# Patient Record
Sex: MALE | Race: WHITE | NOT HISPANIC OR LATINO | Employment: OTHER | URBAN - METROPOLITAN AREA
[De-identification: names, ages, dates, MRNs, and addresses within clinical notes are randomized per-mention and may not be internally consistent; named-entity substitution may affect disease eponyms.]

---

## 2017-06-25 ENCOUNTER — MEDICAL CORRESPONDENCE (OUTPATIENT)
Dept: HEALTH INFORMATION MANAGEMENT | Facility: CLINIC | Age: 27
End: 2017-06-25

## 2018-02-06 ENCOUNTER — MEDICAL CORRESPONDENCE (OUTPATIENT)
Dept: HEALTH INFORMATION MANAGEMENT | Facility: CLINIC | Age: 28
End: 2018-02-06

## 2018-02-20 ENCOUNTER — RADIANT APPOINTMENT (OUTPATIENT)
Dept: CARDIOLOGY | Facility: CLINIC | Age: 28
End: 2018-02-20
Payer: COMMERCIAL

## 2018-02-20 DIAGNOSIS — G71.01 DMD (DUCHENNE MUSCULAR DYSTROPHY) (H): ICD-10-CM

## 2018-02-20 DIAGNOSIS — I42.9 CARDIOMYOPATHY (H): ICD-10-CM

## 2018-07-19 ENCOUNTER — MEDICAL CORRESPONDENCE (OUTPATIENT)
Dept: HEALTH INFORMATION MANAGEMENT | Facility: CLINIC | Age: 28
End: 2018-07-19

## 2018-10-05 ENCOUNTER — RADIANT APPOINTMENT (OUTPATIENT)
Dept: CARDIOLOGY | Facility: CLINIC | Age: 28
End: 2018-10-05
Payer: COMMERCIAL

## 2018-10-05 DIAGNOSIS — G71.01 DMD (DUCHENNE MUSCULAR DYSTROPHY) (H): ICD-10-CM

## 2018-10-18 ENCOUNTER — MEDICAL CORRESPONDENCE (OUTPATIENT)
Dept: HEALTH INFORMATION MANAGEMENT | Facility: CLINIC | Age: 28
End: 2018-10-18

## 2019-05-01 DIAGNOSIS — G71.01 DMD (DUCHENNE MUSCULAR DYSTROPHY) (H): Primary | ICD-10-CM

## 2019-05-21 ENCOUNTER — ANCILLARY PROCEDURE (OUTPATIENT)
Dept: CARDIOLOGY | Facility: CLINIC | Age: 29
End: 2019-05-21
Payer: COMMERCIAL

## 2019-05-21 DIAGNOSIS — G71.01 DMD (DUCHENNE MUSCULAR DYSTROPHY) (H): ICD-10-CM

## 2019-11-15 ENCOUNTER — TRANSFERRED RECORDS (OUTPATIENT)
Dept: HEALTH INFORMATION MANAGEMENT | Facility: CLINIC | Age: 29
End: 2019-11-15

## 2019-12-03 ENCOUNTER — MEDICAL CORRESPONDENCE (OUTPATIENT)
Dept: HEALTH INFORMATION MANAGEMENT | Facility: CLINIC | Age: 29
End: 2019-12-03

## 2019-12-27 ASSESSMENT — ENCOUNTER SYMPTOMS
MEMORY LOSS: 0
DECREASED APPETITE: 0
FEVER: 0
WEIGHT GAIN: 0
MUSCLE CRAMPS: 0
ABDOMINAL PAIN: 1
NIGHT SWEATS: 1
PALPITATIONS: 0
SEIZURES: 0
ARTHRALGIAS: 1
DIARRHEA: 0
MUSCLE WEAKNESS: 1
DISTURBANCES IN COORDINATION: 0
VOMITING: 0
HYPOTENSION: 1
CHILLS: 0
HEADACHES: 0
CONSTIPATION: 1
NAUSEA: 0
LEG PAIN: 0
HALLUCINATIONS: 0
SPEECH CHANGE: 0
HEARTBURN: 0
NUMBNESS: 0
JAUNDICE: 0
FATIGUE: 0
BOWEL INCONTINENCE: 0
SKIN CHANGES: 0
NECK PAIN: 1
JOINT SWELLING: 0
LOSS OF CONSCIOUSNESS: 0
NAIL CHANGES: 1
POLYPHAGIA: 0
POLYDIPSIA: 0
POOR WOUND HEALING: 0
SYNCOPE: 0
WEAKNESS: 0
BLOATING: 1
HYPERTENSION: 0
INCREASED ENERGY: 0
TREMORS: 0
LIGHT-HEADEDNESS: 0
BLOOD IN STOOL: 0
SLEEP DISTURBANCES DUE TO BREATHING: 0
STIFFNESS: 1
MYALGIAS: 1
BACK PAIN: 1
DIZZINESS: 0
EXERCISE INTOLERANCE: 0
PARALYSIS: 0
TINGLING: 0
ORTHOPNEA: 1
WEIGHT LOSS: 0
ALTERED TEMPERATURE REGULATION: 1
RECTAL PAIN: 0

## 2020-01-02 ENCOUNTER — OFFICE VISIT (OUTPATIENT)
Dept: PALLIATIVE CARE | Facility: CLINIC | Age: 30
End: 2020-01-02
Attending: INTERNAL MEDICINE
Payer: COMMERCIAL

## 2020-01-02 VITALS
DIASTOLIC BLOOD PRESSURE: 62 MMHG | HEART RATE: 89 BPM | OXYGEN SATURATION: 99 % | SYSTOLIC BLOOD PRESSURE: 97 MMHG | RESPIRATION RATE: 18 BRPM

## 2020-01-02 DIAGNOSIS — M79.18 MUSCULOSKELETAL PAIN: ICD-10-CM

## 2020-01-02 DIAGNOSIS — Z93.0 TRACHEOSTOMY PRESENT (H): ICD-10-CM

## 2020-01-02 DIAGNOSIS — M62.838 MUSCLE SPASM: ICD-10-CM

## 2020-01-02 DIAGNOSIS — G71.01 DMD (DUCHENNE MUSCULAR DYSTROPHY) (H): Primary | ICD-10-CM

## 2020-01-02 DIAGNOSIS — R07.81 RIB PAIN ON LEFT SIDE: ICD-10-CM

## 2020-01-02 DIAGNOSIS — Z87.81 HISTORY OF VERTEBRAL FRACTURE: ICD-10-CM

## 2020-01-02 DIAGNOSIS — Z93.1 FEEDING BY G-TUBE (H): ICD-10-CM

## 2020-01-02 PROCEDURE — G0463 HOSPITAL OUTPT CLINIC VISIT: HCPCS | Mod: ZF

## 2020-01-02 PROCEDURE — 99204 OFFICE O/P NEW MOD 45 MIN: CPT | Mod: GC | Performed by: STUDENT IN AN ORGANIZED HEALTH CARE EDUCATION/TRAINING PROGRAM

## 2020-01-02 RX ORDER — POLYETHYLENE GLYCOL 3350 17 G/17G
1 POWDER, FOR SOLUTION ORAL PRN
COMMUNITY
End: 2021-01-27

## 2020-01-02 RX ORDER — FAMOTIDINE 20 MG/1
TABLET, FILM COATED ORAL
COMMUNITY
Start: 2019-09-05

## 2020-01-02 RX ORDER — CHLORHEXIDINE GLUCONATE ORAL RINSE 1.2 MG/ML
SOLUTION DENTAL
COMMUNITY
Start: 2019-11-16

## 2020-01-02 RX ORDER — IBUPROFEN 100 MG/5ML
10 SUSPENSION, ORAL (FINAL DOSE FORM) ORAL EVERY 6 HOURS PRN
COMMUNITY

## 2020-01-02 RX ORDER — DESONIDE 0.5 MG/G
CREAM TOPICAL PRN
COMMUNITY

## 2020-01-02 RX ORDER — ACETAMINOPHEN 500 MG
500-1000 TABLET ORAL EVERY 6 HOURS PRN
COMMUNITY

## 2020-01-02 RX ORDER — HYDROCORTISONE 5 MG/1
5 TABLET ORAL DAILY
COMMUNITY

## 2020-01-02 RX ORDER — PREDNISOLONE SODIUM PHOSPHATE 15 MG/5ML
SOLUTION ORAL PRN
COMMUNITY

## 2020-01-02 RX ORDER — OXYCODONE HYDROCHLORIDE 5 MG/1
2.5 TABLET ORAL
COMMUNITY
Start: 2019-08-06 | End: 2020-03-30

## 2020-01-02 RX ORDER — DIPHENHYDRAMINE HCL 12.5 MG/5ML
SOLUTION ORAL EVERY 4 HOURS PRN
COMMUNITY

## 2020-01-02 RX ORDER — MINERAL OIL/HYDROPHIL PETROLAT
OINTMENT (GRAM) TOPICAL PRN
COMMUNITY

## 2020-01-02 RX ORDER — ALBUTEROL SULFATE 90 UG/1
AEROSOL, METERED RESPIRATORY (INHALATION) EVERY 4 HOURS PRN
COMMUNITY
Start: 2019-07-16

## 2020-01-02 RX ORDER — LIDOCAINE 50 MG/G
1 PATCH TOPICAL PRN
COMMUNITY

## 2020-01-02 RX ORDER — CETIRIZINE HYDROCHLORIDE 10 MG/1
7.5 TABLET ORAL DAILY
COMMUNITY

## 2020-01-02 RX ORDER — SILVER SULFADIAZINE 10 MG/G
CREAM TOPICAL PRN
COMMUNITY

## 2020-01-02 RX ORDER — LOSARTAN POTASSIUM 25 MG/1
25 TABLET ORAL
COMMUNITY

## 2020-01-02 RX ORDER — TOCOPHERSOLAN (VITAMIN E TPGS) 400/15ML
1 LIQUID (ML) ORAL
COMMUNITY
Start: 2019-09-03

## 2020-01-02 RX ORDER — LACTULOSE 10 G/15ML
20 SOLUTION ORAL PRN
COMMUNITY

## 2020-01-02 RX ORDER — BUDESONIDE AND FORMOTEROL FUMARATE DIHYDRATE 80; 4.5 UG/1; UG/1
AEROSOL RESPIRATORY (INHALATION)
COMMUNITY
Start: 2019-11-04

## 2020-01-02 RX ORDER — ALBUTEROL SULFATE 0.83 MG/ML
2.5 SOLUTION RESPIRATORY (INHALATION) EVERY 4 HOURS PRN
COMMUNITY
Start: 2019-07-17

## 2020-01-02 RX ORDER — SODIUM CHLORIDE 9 MG/ML
INJECTION, SOLUTION INTRAVENOUS PRN
COMMUNITY

## 2020-01-02 RX ORDER — ONDANSETRON 4 MG/1
TABLET, FILM COATED ORAL EVERY 8 HOURS PRN
COMMUNITY

## 2020-01-02 RX ORDER — SENNOSIDES 8.6 MG
1 TABLET ORAL DAILY PRN
COMMUNITY
End: 2021-01-27

## 2020-01-02 RX ORDER — CARVEDILOL 3.12 MG/1
TABLET ORAL
COMMUNITY
Start: 2019-06-20

## 2020-01-02 NOTE — LETTER
"1/2/2020       RE: Ernesto Harding  [address]  [facility] [address]     Dear Colleague,    Thank you for referring your patient, Ernesto Harding, to the [facility] at [facility]. Please see a copy of my visit note below.    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 He was previously seen by Palliative Care at [facility]. Admitted to [facility] ICU in July 2019, was intubated, and made the decision to undergo a tracheostomy placement. At his last appointment with [facility] in [facility], he reported significant discomfort from the trach, as well as neck pain and diffuse back pain. Describes neck pain as \"pulling and tight\". Foam cushion, repositioning, lidocaine patches, heat, and different trach ties have been used to help with this discomfort. Was taking Oxycodone 7.5mg 5x/day (total of 37.5mg daily), reporting pain relief after each dose lasted only about 2-3 hours. Given lack of benefit, chronicity of use, and side effects (previously concern about feeling 'happy and relaxed' on oxy), a 6 week wean off the oxycodone was initiated.     Additionally on Naproxen and was certified for medical cannabis this Fall. Plan had been for a 4 week trial of cannabis. If still having difficulty with anxiety, muscular pain, and sleep, starting doxepin had been discussed.     Some records from [facility] appear to be scanned in, however, the notes are incomplete. We are working on getting the full records.    Today, he reports " "the following concerns:     Pain: Primary sources of pain are his R posterior neck (mostly muscle spasm, really intense soreness), around his G-tube site (more sharp pain), and his left anterior chest wall (felt 2/2 to a resumed rib fracture, aching pain more pronounced with movement). Reports some initial difficulty with the oxycodone taper (felt it was too dramatic initially). Has been decreasing by 5mg/week and tolerating OK until this last week. When he went from 5mg 4x daily to 5mg 3x daily, he noticed significant change in his pain. Spreading out his doses has been difficult given that oxy lasts at most 3 hours. To fill in the gaps, has been taking his medical marijuana 2-3x daily-notes it does help pain some but also negatively affects his cognition. Taking Tylenol 1000mg 2x daily (good relief for only ~30mins). Applying full lidocaine patch to neck and half patch around G-tube site-unsure how much it helps. Continues to use heat but struggles with overheating. Pain most notable in the morning after lying still all night. Due to hx of recurrent vertebral fractures, unable to be rolled or change positions, which also contributed to pain. He agrees with idea of getting off the opioids, however, he is very anxious about not having something for his pain, particularly if he has some sort of crisis. Per his nurse, both his care team and his family are concerned about the speed of the opioid taper without additional plans for pain control in place.    When asked what he was hoping for today from us he said \"A) To get other ideas for pain relief and B) Ideas for getting comfortable.\" At this point, only the oxy makes him completely comfortable, ut even this is temporary. \"It's exhausting and frustrating always trying to get comfortable\". States he has wondered about muscle relaxers to help with pain but was told by his cardiologist \"a long, long time ago\" that they might be bad for his heart. He has his next " appointment with Cardiology 1/17.    Sleep: No longer a problem for him since starting the medical cannabis.    Appetite/Nausea: No concerns. G-tube placement has helped improve his nutritional status.    Constipation: Longstanding history of BMs only twice weekly even prior to opioid therapy. Currently taking lactulose twice weekly, which results in a BM each time.    Mood: Denies feeling depressed. Is bothered by inability to have sustained periods of comfort without pain. Has many people he cares about around him on a regular basis (knew many of his nursing and PCA team members prior to them caring for him). Main source of anxiety is what to do if his pain gets worse.     Patient's Disease Understanding: Due to the complexity of his history and his symptom discussion, did not have much time to address his disease understanding. He did express that he desire being comfortable, so he would prefer interventions that do so without causing him other harm/side effects. However, if this is not possible, he thinks he would prioritize comfort for better quality of life.    Coping: Finds chucho in watching sports and other TV shows, as well as reading. They are working on getting him a device that would allow him to resume kerry (has struggled with strength in his arms/hands). Also notes his large number of friends and his care team are a source of strength for him.    Social History  Living Situation: Lives with his Dad and a cousin. Also has PCA and 24H nursing care. His Mom lives about a mile away.  Actual/Potential Caregiver(s): See above.  Support System: Multiple friends and family members  Hobbies: Reading, sport, TV, Kerry    Social History     Tobacco Use     Smoking status: Never Smoker     Smokeless tobacco: Never Used   Substance Use Topics     Alcohol use: None     Drug use: None     Advance Care Planning:  Advance Directive: Does not have a formal HCD but did complete a POLST previously. States that he is DNR  but otherwise full treatment. They will work on getting us a copy.    Allergies   Allergen Reactions     Duloxetine Nausea and Vomiting     Pollen Extract      Current Outpatient Medications   Medication Sig Dispense Refill     acetaminophen (TYLENOL) 500 MG tablet Take 500-1,000 mg by mouth every 6 hours as needed for mild pain       albuterol (PROAIR HFA/PROVENTIL HFA/VENTOLIN HFA) 108 (90 Base) MCG/ACT inhaler every 4 hours as needed        albuterol (PROVENTIL) (2.5 MG/3ML) 0.083% neb solution Inhale 2.5 mg into the lungs every 4 hours as needed        budesonide-formoterol (SYMBICORT) 80-4.5 MCG/ACT Inhaler INHALE 2 PUFFS BY MOUTH EVERY DAY       Calcium-Magnesium-Vitamin D 500-250-200 MG-MG-UNIT TABS Take 1 Units by mouth       carvedilol (COREG) 3.125 MG tablet TAKE 1 TABLET BY MOUTH TWICE A DAY       cetirizine (ZYRTEC) 10 MG tablet Take 7.5 mg by mouth daily       chlorhexidine (PERIDEX) 0.12 % solution SWISH AND SPIT 15ML BY MOUTH 2 TIMES A DAY - DO NOT SWALLOW       desonide (DESOWEN) 0.05 % external cream Apply topically as needed        diphenhydrAMINE (BENADRYL) 12.5 MG/5ML liquid Take by mouth every 4 hours as needed for allergies or sleep        famotidine (PEPCID) 20 MG tablet TAKE 20MG (1 TABLET) BY MOUTH OR G-TUBE 2 TIMES A DAY       hydrocortisone (CORTEF) 5 MG tablet Take 5 mg by mouth daily       ibuprofen (ADVIL/MOTRIN) 100 MG/5ML suspension Take 10 mg/kg by mouth every 6 hours as needed for fever or moderate pain       lactulose (CHRONULAC) 10 GM/15ML solution Take 20 g by mouth as needed        lidocaine (LIDODERM) 5 % patch Place 1 patch onto the skin as needed To prevent lidocaine toxicity, patient should be patch free for 12 hrs daily.        losartan (COZAAR) 25 MG tablet Take 25 mg by mouth       magnesium sulfate (EPSOM SALT) GRAN granules Apply topically daily as needed        medical cannabis (Patient's own supply) as needed (The purpose of this order is to document that the patient  reports taking medical cannabis.  This is not a prescription, and is not used to certify that the patient has a qualifying medical condition.)        mineral oil-hydrophilic petrolatum (AQUAPHOR) external ointment Apply topically as needed       ondansetron (ZOFRAN) 4 MG tablet Take by mouth every 8 hours as needed for nausea       oxyCODONE (ROXICODONE) 5 MG tablet Take 2.5 mg by mouth       polyethylene glycol (MIRALAX/GLYCOLAX) packet Take 1 packet by mouth as needed        prednisoLONE (ORAPRED) 15 MG/5 ML solution Take by mouth as needed        sennosides (SENOKOT) 8.6 MG tablet Take 1 tablet by mouth daily as needed        silver sulfADIAZINE (SILVADENE) 1 % external cream Apply topically as needed        sodium chloride 0.9% infusion as needed        No past medical history on file. See above.   No past surgical history on file. See above.    REVIEW OF SYSTEMS:   ROS: 10 point ROS neg other than the symptoms noted above in the HPI and here:  Palliative Symptom Review (0=no symptom/no concern, 1=mild, 2=moderate, 3=severe):      Pain: 2      Fatigue: 1      Nausea: 0      Constipation: 2      Diarrhea: 0      Depressive Symptoms: 0      Anxiety: 2      Drowsiness: 0      Poor Appetite: 0      Shortness of Breath: 0      Insomnia: 0    Physical Exam:  BP 97/62 (BP Location: Left arm, Patient Position: Chair, Cuff Size: Adult Regular)   Pulse 89   Resp 18   SpO2 99%   Constitutional: Thin, sitting in wheelchair.  HEENT: No head abnormalities. Sclera non-icteric, no nasal discharge. MMM. Ears grossly normal.  Neck: Trach in place.  Cardiovascular: RRR.   Respiratory: Lungs sound mostly clear, some referred upper airway sounds.  Gastrointestinal: Abdomen soft. BS decreased. G-tube in place.   Musculoskeletal: Diminished muscle bulk throughout. Tender to palpation over left anterior chest about ribs 7-8.  Skin: no suspicious lesions or rashes  Psychiatric: mentation appears normal and affect  normal/bright    Data Reviewed:  LABS: 08/02/19- Cr <0.15. WBC 8.6, Hgb 9.7, Plts 259.    IMAGING: XR Abdomen from OSH 8/5/19- Abdomen X-Ray: Comparison 7/27/19.Findings: Percutaneous gastro-jejunostomy is present. Injection of enteric contrast reveals partial filling of the proximal jejunum with no evidence ofextravasation or leak. IMPRESSION:Gastro-jejunostomy tube tip is in   the proximal left small intestine.    Images personally reviewed: No, no hard copy of images in our system.    [place]  reviewed and fills consistent with history: Yes    Opioid Risk Tool (ORT):    Family History of Substance Abuse:        Alcohol = 0 pt (no)       Illegal Drugs = 0 pt (no)       Prescription Drugs = 0 pt (no)      Personal History of Substance Abuse:       Alcohol = 0 pt (no)       Illegal Drugs = 0 pt (no)       Prescription Drugs = 0 pt (no)        Age: 1 pt (age 16-45)      History of Pre-adolescent Sexual Abuse: 0 pt (no)      Psychological Disease: 0 pt (none)      ORT Score = 1        0-3: Low risk for opioid abuse       4-7: Moderate risk for opioid abuse       >/= 8: High risk for opioid abuse    [facility] Outpatient Palliative Care Opioid Prescribing Safety Plan   Palliative safety education performed during clinic visit 01/02/2020  Opioid risk assessment performed 01/02/2020. ORT score: 1  Mood disorder assessment performed 01/02/2020.     Prognosis likely >1 year  Opioid indication is weak  Opioid risk is low (ORT < 4)  Tapering or referral plan: Has tapered down from 37.5mg oxycodone daily to 15mg daily. Previously decreased by 5mg each week. Will plan on decreasing by 2.5mg every week at this point.    Impressions:  Palliative Performance Score: 30%  Decision Making Capacity:  Full    Ernesto Harding is a 29 year old male with history of Duchenne's Muscular Dystrophy and recurrent vertebral fractures who was previously followed by Palliative Care at [facility]. Since July 2019, he has been trach dependent  and also now uses a G-tube. He has persistent pain in his neck, chest, and upper abdomen near his G-tube, as well as regular pain in his arms and legs. At his last appointment with Brice in November, a taper of his oxycodone was initiated at 5mg decrease/week. This had been going OK until the most recent decrease from 4x daily to 3x daily. Ernesto, his family, and his home care team are all concerned about continuing the current taper schedule without additional plans for pain management. Ernesto expressed several times his desire to be comfortable for more time during the day yet recognizes that opioids are ultimately not the best way to achieve this.     Recommendations & Counseling:  We spent quite a bit of time characterizing Ernesto's pain. It seems that much of his pain stems from muscle spasms, or at least muscle tightness. We agreed that if Cardiology would approve use of a muscle relaxer, that would be our first choice for addition to his regimen. We had a direct conversation about having his personal health goals in mind when talking to Cardiology, recommending that he inquire what exactly could be the effects of muscle relaxers for him and would those possibilities of negative effects outweigh his desire for comfort. If they ultimately decide muscle relaxers are not worth the risk, we could consider gabapentin. He had tried lyrica for only 5 days last Summer, not long enough for a full therapeutic trial.    Given his greater difficulty with adjusting to the opioid taper, agree that spreading out/slowing down the taper at this point makes sense. They will continue at the current dose of 5mg TID until Jan 8, at which point we will start decreasing by 2.5mg/week. We will continue to evaluate this rate and consider if we need to slow this down in the future.     1. Would suggest starting a muscle relaxer if this is OK with Cardiology. We would likely start with either methocarbamol or tizanidine. If they talk to  Cardiology prior to our next appointment and a muscle relaxer is approved, OK for covering physician to send Rx to their preferred pharmacy. Of note, both [facility] and the nursing agency need copies of the Rx.  2. If a muscle relaxer is not feasible, we recommend gabapentin. Would likely start with 100mg at bedtime given his smaller size/frailty.  3. Discussed we will take over prescribing his pain medication. Plan to slow down the taper a bit and maintain 5mg oxycodone 3 times daily until Wednesday the 8th, at which point we will go down to 2.5mg, 5mg, 5mg. We will plan on decreasing by 2.5mg each week for now. If he has increased pain or difficulty after the initial 3 days after a decrease, instructed him to please let us know.  4. We may consider a referral for some injections into the muscles that are spasming to help with pain.  5. His current bowel regimen is working for him and has been his normal for years. Did suggest adding in a 3rd dose of lactulose weekly to try to have 3 BMs/wk.    Return in clinic in 4 weeks for a follow-up.     Patient seen and discussed with Dr. Nerissa Arellano.    Vicky Colvin DO  Palliative Medicine Fellow    Attending attestation:   Patient seen and evaluated with Dr. Colvin and I agree with findings/recs in this note.   Thank you for involving us in the patient's care.   Nerissa Arellano MD / Palliative Medicine / Pager [phone] / After-Hours Answering Service [phone] / Main Palliative Clinic - [facility] [phone] / [facility] Inpatient Team Consult Pager [phone] (answered 8am-430pm M-F)

## 2020-01-02 NOTE — NURSING NOTE
Oncology Rooming Note    January 2, 2020 2:52 PM   Ernesto Harding is a 29 year old male who presents for:    Chief Complaint   Patient presents with     Oncology Clinic Visit     [facility] NEW- DUCHENNE MUSCULAR DYSTROPHY     Initial Vitals: BP 97/62 (BP Location: Left arm, Patient Position: Chair, Cuff Size: Adult Regular)   Pulse 89   Resp 18   SpO2 99%  There is no height or weight on file to calculate BMI. There is no height or weight on file to calculate BSA.  Data Unavailable Comment: Data Unavailable   No LMP for male patient.  Allergies reviewed: Yes  Medications reviewed: Yes    Medications: Medication refills not needed today.  Pharmacy name entered into EPIC: [facility] [place], [place] - [address] [place] [address]    Clinical concerns: Unable to get weight or height. Patient in electric wheel chair with trach unable to stand, patient can not talk well only mouth words. Went over medication list and allergies with care giver. [facility] was notified.      Clint Yates LPN

## 2020-01-02 NOTE — PROGRESS NOTES
"Palliative Care Outpatient Clinic Consultation Note    Patient: Ernesto Harding    Chief Complaint:   Ernesto Harding 29 year old male who is presenting to the palliative medicine clinic today for a consultation secondary to Duchenne's Muscular Dystrophy. 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 He was previously seen by Palliative Care at [facility]. Admitted to [facility]'s ICU in July 2019, was intubated, and made the decision to undergo a tracheostomy placement. At his last appointment with Brice in [facility], he reported significant discomfort from the trach, as well as neck pain and diffuse back pain. Describes neck pain as \"pulling and tight\". Foam cushion, repositioning, lidocaine patches, heat, and different trach ties have been used to help with this discomfort. Was taking Oxycodone 7.5mg 5x/day (total of 37.5mg daily), reporting pain relief after each dose lasted only about 2-3 hours. Given lack of benefit, chronicity of use, and side effects (previously concern about feeling 'happy and relaxed' on oxy), a 6 week wean off the oxycodone was initiated.     Additionally on Naproxen and was certified for medical cannabis this Fall. Plan had been for a 4 week trial of cannabis. If still having difficulty with anxiety, muscular pain, and sleep, starting doxepin had been discussed.     Some records from [employer] appear to be scanned in, however, the notes are incomplete. We are working on getting the full records.    Today, he reports the following concerns:     Pain: Primary sources of pain are his R posterior neck (mostly muscle spasm, really intense soreness), around his G-tube site (more sharp pain), and his left anterior chest wall (felt 2/2 to a resumed rib fracture, aching pain more pronounced with " "movement). Reports some initial difficulty with the oxycodone taper (felt it was too dramatic initially). Has been decreasing by 5mg/week and tolerating OK until this last week. When he went from 5mg 4x daily to 5mg 3x daily, he noticed significant change in his pain. Spreading out his doses has been difficult given that oxy lasts at most 3 hours. To fill in the gaps, has been taking his medical marijuana 2-3x daily-notes it does help pain some but also negatively affects his cognition. Taking Tylenol 1000mg 2x daily (good relief for only ~30mins). Applying full lidocaine patch to neck and half patch around G-tube site-unsure how much it helps. Continues to use heat but struggles with overheating. Pain most notable in the morning after lying still all night. Due to hx of recurrent vertebral fractures, unable to be rolled or change positions, which also contributed to pain. He agrees with idea of getting off the opioids, however, he is very anxious about not having something for his pain, particularly if he has some sort of crisis. Per his nurse, both his care team and his family are concerned about the speed of the opioid taper without additional plans for pain control in place.    When asked what he was hoping for today from us he said \"A) To get other ideas for pain relief and B) Ideas for getting comfortable.\" At this point, only the oxy makes him completely comfortable, ut even this is temporary. \"It's exhausting and frustrating always trying to get comfortable\". States he has wondered about muscle relaxers to help with pain but was told by his cardiologist \"a long, long time ago\" that they might be bad for his heart. He has his next appointment with Cardiology 1/17.    Sleep: No longer a problem for him since starting the medical cannabis.    Appetite/Nausea: No concerns. G-tube placement has helped improve his nutritional status.    Constipation: Longstanding history of BMs only twice weekly even prior to " opioid therapy. Currently taking lactulose twice weekly, which results in a BM each time.    Mood: Denies feeling depressed. Is bothered by inability to have sustained periods of comfort without pain. Has many people he cares about around him on a regular basis (knew many of his nursing and PCA team members prior to them caring for him). Main source of anxiety is what to do if his pain gets worse.     Patient's Disease Understanding: Due to the complexity of his history and his symptom discussion, did not have much time to address his disease understanding. He did express that he desire being comfortable, so he would prefer interventions that do so without causing him other harm/side effects. However, if this is not possible, he thinks he would prioritize comfort for better quality of life.    Coping: Finds chucho in watching sports and other TV shows, as well as reading. They are working on getting him a device that would allow him to resume kerry (has struggled with strength in his arms/hands). Also notes his large number of friends and his care team are a source of strength for him.    Social History  Living Situation: Lives with his Dad and a cousin. Also has PCA and [facility] nursing care. His Mom lives about a mile away.  Actual/Potential Caregiver(s): See above.  Support System: Multiple friends and family members  Hobbies: Reading, sport, TV, Kerry    Social History     Tobacco Use     Smoking status: Never Smoker     Smokeless tobacco: Never Used   Substance Use Topics     Alcohol use: None     Drug use: None     Advance Care Planning:  Advance Directive: Does not have a formal HCD but did complete a POLST previously. States that he is DNR but otherwise full treatment. They will work on getting us a copy.    Allergies   Allergen Reactions     Duloxetine Nausea and Vomiting     Pollen Extract      Current Outpatient Medications   Medication Sig Dispense Refill     acetaminophen (TYLENOL) 500 MG tablet Take  500-1,000 mg by mouth every 6 hours as needed for mild pain       albuterol (PROAIR HFA/PROVENTIL HFA/VENTOLIN HFA) 108 (90 Base) MCG/ACT inhaler every 4 hours as needed        albuterol (PROVENTIL) (2.5 MG/3ML) 0.083% neb solution Inhale 2.5 mg into the lungs every 4 hours as needed        budesonide-formoterol (SYMBICORT) 80-4.5 MCG/ACT Inhaler INHALE 2 PUFFS BY MOUTH EVERY DAY       Calcium-Magnesium-Vitamin D 500-250-200 MG-MG-UNIT TABS Take 1 Units by mouth       carvedilol (COREG) 3.125 MG tablet TAKE 1 TABLET BY MOUTH TWICE A DAY       cetirizine (ZYRTEC) 10 MG tablet Take 7.5 mg by mouth daily       chlorhexidine (PERIDEX) 0.12 % solution SWISH AND SPIT 15ML BY MOUTH 2 TIMES A DAY - DO NOT SWALLOW       desonide (DESOWEN) 0.05 % external cream Apply topically as needed        diphenhydrAMINE (BENADRYL) 12.5 MG/5ML liquid Take by mouth every 4 hours as needed for allergies or sleep        famotidine (PEPCID) 20 MG tablet TAKE 20MG (1 TABLET) BY MOUTH OR G-TUBE 2 TIMES A DAY       hydrocortisone (CORTEF) 5 MG tablet Take 5 mg by mouth daily       ibuprofen (ADVIL/MOTRIN) 100 MG/5ML suspension Take 10 mg/kg by mouth every 6 hours as needed for fever or moderate pain       lactulose (CHRONULAC) 10 GM/15ML solution Take 20 g by mouth as needed        lidocaine (LIDODERM) 5 % patch Place 1 patch onto the skin as needed To prevent lidocaine toxicity, patient should be patch free for 12 hrs daily.        losartan (COZAAR) 25 MG tablet Take 25 mg by mouth       magnesium sulfate (EPSOM SALT) GRAN granules Apply topically daily as needed        medical cannabis (Patient's own supply) as needed (The purpose of this order is to document that the patient reports taking medical cannabis.  This is not a prescription, and is not used to certify that the patient has a qualifying medical condition.)        mineral oil-hydrophilic petrolatum (AQUAPHOR) external ointment Apply topically as needed       ondansetron (ZOFRAN) 4 MG  tablet Take by mouth every 8 hours as needed for nausea       oxyCODONE (ROXICODONE) 5 MG tablet Take 2.5 mg by mouth       polyethylene glycol (MIRALAX/GLYCOLAX) packet Take 1 packet by mouth as needed        prednisoLONE (ORAPRED) 15 MG/5 ML solution Take by mouth as needed        sennosides (SENOKOT) 8.6 MG tablet Take 1 tablet by mouth daily as needed        silver sulfADIAZINE (SILVADENE) 1 % external cream Apply topically as needed        sodium chloride 0.9% infusion as needed        No past medical history on file. See above.   No past surgical history on file. See above.    REVIEW OF SYSTEMS:   ROS: 10 point ROS neg other than the symptoms noted above in the HPI and here:  Palliative Symptom Review (0=no symptom/no concern, 1=mild, 2=moderate, 3=severe):      Pain: 2      Fatigue: 1      Nausea: 0      Constipation: 2      Diarrhea: 0      Depressive Symptoms: 0      Anxiety: 2      Drowsiness: 0      Poor Appetite: 0      Shortness of Breath: 0      Insomnia: 0    Physical Exam:  BP 97/62 (BP Location: Left arm, Patient Position: Chair, Cuff Size: Adult Regular)   Pulse 89   Resp 18   SpO2 99%   Constitutional: Thin, sitting in wheelchair.  HEENT: No head abnormalities. Sclera non-icteric, no nasal discharge. MMM. Ears grossly normal.  Neck: Trach in place.  Cardiovascular: RRR.   Respiratory: Lungs sound mostly clear, some referred upper airway sounds.  Gastrointestinal: Abdomen soft. BS decreased. G-tube in place.   Musculoskeletal: Diminished muscle bulk throughout. Tender to palpation over left anterior chest about ribs 7-8.  Skin: no suspicious lesions or rashes  Psychiatric: mentation appears normal and affect normal/bright    Data Reviewed:  LABS: 08/02/19- Cr <0.15. WBC 8.6, Hgb 9.7, Plts 259.    IMAGING: XR Abdomen from OSH 8/5/19- Abdomen X-Ray: Comparison 7/27/19.Findings: Percutaneous gastro-jejunostomy is present. Injection of enteric contrast reveals partial filling of the proximal jejunum  with no evidence ofextravasation or leak. IMPRESSION:Gastro-jejunostomy tube tip is in   the proximal left small intestine.    Images personally reviewed: No, no hard copy of images in our system.    MN  reviewed and fills consistent with history: Yes    Opioid Risk Tool (ORT):    Family History of Substance Abuse:        Alcohol = 0 pt (no)       Illegal Drugs = 0 pt (no)       Prescription Drugs = 0 pt (no)      Personal History of Substance Abuse:       Alcohol = 0 pt (no)       Illegal Drugs = 0 pt (no)       Prescription Drugs = 0 pt (no)        Age: 1 pt (age 16-45)      History of Pre-adolescent Sexual Abuse: 0 pt (no)      Psychological Disease: 0 pt (none)      ORT Score = 1        0-3: Low risk for opioid abuse       4-7: Moderate risk for opioid abuse       >/= 8: High risk for opioid abuse    [facility] Outpatient Palliative Care Opioid Prescribing Safety Plan   Palliative safety education performed during clinic visit 01/02/2020  Opioid risk assessment performed 01/02/2020. ORT score: 1  Mood disorder assessment performed 01/02/2020.     Prognosis likely >1 year  Opioid indication is weak  Opioid risk is low (ORT < 4)  Tapering or referral plan: Has tapered down from 37.5mg oxycodone daily to 15mg daily. Previously decreased by 5mg each week. Will plan on decreasing by 2.5mg every week at this point.    Impressions:  Palliative Performance Score: 30%  Decision Making Capacity:  Full    Ernesto Harding is a 29 year old male with history of Duchenne's Muscular Dystrophy and recurrent vertebral fractures who was previously followed by Palliative Care at [facility]. Since July 2019, he has been trach dependent and also now uses a G-tube. He has persistent pain in his neck, chest, and upper abdomen near his G-tube, as well as regular pain in his arms and legs. At his last appointment with Brice in November, a taper of his oxycodone was initiated at 5mg decrease/week. This had been going OK until  the most recent decrease from 4x daily to 3x daily. Ernesto, his family, and his home care team are all concerned about continuing the current taper schedule without additional plans for pain management. Ernesto expressed several times his desire to be comfortable for more time during the day yet recognizes that opioids are ultimately not the best way to achieve this.     Recommendations & Counseling:  We spent quite a bit of time characterizing Ernesto's pain. It seems that much of his pain stems from muscle spasms, or at least muscle tightness. We agreed that if Cardiology would approve use of a muscle relaxer, that would be our first choice for addition to his regimen. We had a direct conversation about having his personal health goals in mind when talking to Cardiology, recommending that he inquire what exactly could be the effects of muscle relaxers for him and would those possibilities of negative effects outweigh his desire for comfort. If they ultimately decide muscle relaxers are not worth the risk, we could consider gabapentin. He had tried lyrica for only 5 days last Summer, not long enough for a full therapeutic trial.    Given his greater difficulty with adjusting to the opioid taper, agree that spreading out/slowing down the taper at this point makes sense. They will continue at the current dose of 5mg TID until Jan 8, at which point we will start decreasing by 2.5mg/week. We will continue to evaluate this rate and consider if we need to slow this down in the future.     1. Would suggest starting a muscle relaxer if this is OK with Cardiology. We would likely start with either methocarbamol or tizanidine. If they talk to Cardiology prior to our next appointment and a muscle relaxer is approved, OK for covering physician to send Rx to their preferred pharmacy. Of note, both [facility] and the nursing agency need copies of the Rx.  2. If a muscle relaxer is not feasible, we recommend gabapentin. Would likely  start with 100mg at bedtime given his smaller size/frailty.  3. Discussed we will take over prescribing his pain medication. Plan to slow down the taper a bit and maintain 5mg oxycodone 3 times daily until Wednesday the 8th, at which point we will go down to 2.5mg, 5mg, 5mg. We will plan on decreasing by 2.5mg each week for now. If he has increased pain or difficulty after the initial 3 days after a decrease, instructed him to please let us know.  4. We may consider a referral for some injections into the muscles that are spasming to help with pain.  5. His current bowel regimen is working for him and has been his normal for years. Did suggest adding in a 3rd dose of lactulose weekly to try to have 3 BMs/wk.    Return in clinic in 4 weeks for a follow-up.     Patient seen and discussed with Dr. Nerissa Arellano.    Vicky Colvin DO  Palliative Medicine Fellow    Attending attestation:   Patient seen and evaluated with Dr. Colvin and I agree with findings/recs in this note.   Thank you for involving us in the patient's care.   Nerissa Arellano MD / Palliative Medicine / Pager [phone] / After-Hours Answering Service [phone] / Main Palliative Clinic - [facility] [phone] / [facility] Inpatient Team Consult Pager [phone] (answered 8am-430pm M-F)

## 2020-01-02 NOTE — PATIENT INSTRUCTIONS
Thank you for coming into the Palliative Care Clinic today.      1. Would suggest starting a muscle relaxer if this is OK with your Cardiologist. Some options we might try are methocarbamol or tizanidine.  2. If a muscle relaxer is not feasible, we would recommend gabapentin.  3. We will take over prescribing your pain medication. We will slow down the taper a bit and have you stay on 5mg oxycodone 3 times daily until Wednesday the 8th, at which point we will go down to 2.5mg, 5mg, 5mg. We will plan on decreasing by 2.5mg each week for now. If you have increased pain or difficulty after the initial 3 days after a decrease, please let us know.  4. We may consider a referral for some injections into the muscles that are spasming to help with pain.    Return in clinic in 4 weeks for a follow-up.      You can reach the Palliative Care Team during business hours at the following numbers:   -For the [facility], call [phone]     To reach the Palliative Care Provider on-call After-hours or on holidays and weekends, call: [phone].  Please note that we are not able to provide pain medication refills on evenings or weekends.

## 2020-01-04 PROBLEM — Z93.0 TRACHEOSTOMY PRESENT (H): Status: ACTIVE | Noted: 2020-01-04

## 2020-01-04 PROBLEM — Z87.81 HISTORY OF VERTEBRAL FRACTURE: Status: ACTIVE | Noted: 2020-01-04

## 2020-01-04 PROBLEM — Z93.1 FEEDING BY G-TUBE (H): Status: ACTIVE | Noted: 2020-01-04

## 2020-01-04 PROBLEM — R07.81 RIB PAIN ON LEFT SIDE: Status: ACTIVE | Noted: 2020-01-04

## 2020-01-07 ENCOUNTER — TELEPHONE (OUTPATIENT)
Dept: PALLIATIVE CARE | Facility: CLINIC | Age: 30
End: 2020-01-07

## 2020-01-07 DIAGNOSIS — G71.01 DMD (DUCHENNE MUSCULAR DYSTROPHY) (H): Primary | ICD-10-CM

## 2020-01-07 DIAGNOSIS — M79.18 MUSCULOSKELETAL PAIN: ICD-10-CM

## 2020-01-07 DIAGNOSIS — M62.838 MUSCLE SPASM: ICD-10-CM

## 2020-01-07 DIAGNOSIS — R07.81 RIB PAIN ON LEFT SIDE: ICD-10-CM

## 2020-01-07 DIAGNOSIS — G71.01 MUSCULAR DYSTROPHY, DUCHENNE (H): Primary | ICD-10-CM

## 2020-01-07 DIAGNOSIS — R07.81 RIB PAIN ON LEFT SIDE: Primary | ICD-10-CM

## 2020-01-07 RX ORDER — OXYCODONE HCL 5 MG/5 ML
SOLUTION, ORAL ORAL
Qty: 210 ML | Refills: 0 | Status: SHIPPED | OUTPATIENT
Start: 2020-01-07 | End: 2020-03-05

## 2020-01-07 RX ORDER — OXYCODONE HYDROCHLORIDE 5 MG/1
5 TABLET ORAL EVERY 6 HOURS PRN
Qty: 20 TABLET | Refills: 0 | Status: CANCELLED | OUTPATIENT
Start: 2020-01-07

## 2020-01-07 NOTE — TELEPHONE ENCOUNTER
"Received VM from patient's homecare nurse - she states they have enough oxycodone to make it through today and Wednesday, but will be completely out Thursday. They request a new script be sent.     Called nurse back - she states that they actually have enough for another day or so past what she thought - someone transposed a number? She reports dose change is planned for tomorrow, he will decrease to 2.5mg AM, 5mg afternoon, 5mg PM.    Advised I would route this request to the MD for approval.    Per office visit note from last week: \"Discussed we will take over prescribing his pain medication. Plan to slow down the taper a bit and maintain 5mg oxycodone 3 times daily until Wednesday the 8th, at which point we will go down to 2.5mg, 5mg, 5mg. We will plan on decreasing by 2.5mg each week for now. If he has increased pain or difficulty after the initial 3 days after a decrease, instructed him to please let us know.\"    Last refill: 12/18/2019 (per MN )  Last office visit: 1/2/2019  Scheduled for follow up 2/13/2019    MN  Report reviewed.    Nurse Jessie, phone #[phone]   "

## 2020-01-07 NOTE — TELEPHONE ENCOUNTER
Received VM from home care nurse that works with patient. She wanted to let MDs know that they spoke with cardiology. Per her report, cardiology would be okay with trying low dose methocarbamol vs tizanidine. Worried tizanidine will decrease blood pressure too much. Would recommend low dose methocarbamol and blood pressure checks prior to dose increases.     Will route to MDs.     Nurse Roman, phone #[phone]

## 2020-01-08 RX ORDER — METHOCARBAMOL 500 MG/1
500 TABLET, FILM COATED ORAL 4 TIMES DAILY PRN
Qty: 120 TABLET | Refills: 3 | Status: SHIPPED | OUTPATIENT
Start: 2020-01-08 | End: 2020-05-13

## 2020-01-08 NOTE — TELEPHONE ENCOUNTER
Reviewed with MD Olena christianson for methocarbamol 500mg QID PRN. Called patient's home care RN and advised of this. Order faxed to patient's preferred pharmacy as well as patient's home care agency, [facility] (attn: Sriram, fax #[phone]).

## 2020-01-14 ENCOUNTER — HOSPITAL ENCOUNTER (OUTPATIENT)
Dept: CARDIOLOGY | Facility: CLINIC | Age: 30
Discharge: HOME | End: 2020-01-14
Attending: PHYSICIAN ASSISTANT | Admitting: PHYSICIAN ASSISTANT
Payer: COMMERCIAL

## 2020-01-14 ENCOUNTER — TELEPHONE (OUTPATIENT)
Dept: PALLIATIVE CARE | Facility: CLINIC | Age: 30
End: 2020-01-14

## 2020-01-14 DIAGNOSIS — G71.01 MUSCULAR DYSTROPHY, DUCHENNE (H): ICD-10-CM

## 2020-01-14 PROCEDURE — 93306 TTE W/DOPPLER COMPLETE: CPT

## 2020-01-14 NOTE — TELEPHONE ENCOUNTER
LVM for home care nurse advising okay to delay step down until Thursday. Advised unable to send written order as MD not in clinic until tomorrow. Requested call back with information on where to send order. Will wait for call back.

## 2020-01-14 NOTE — TELEPHONE ENCOUNTER
"Received VM from patient's home care nurse. She was not with patient over the weekend, however upon her return this week patient has been reporting increased pain. Started over the weekend. Pain is in GJ tube site. Tomorrow patient scheduled for evaluation of this and possible replacement.     Since patient is going in tomorrow, he is wondering if okay to delay oxycodone decrease to Thursday instead of Wednesday due to anticipated pain with GJ change - has been painful in the past.     Patient's home care nurse reports that patient has been reporting 2.5mg oxycodone does \"literaly nothing\" for pain control. Also notes that methocarbamol is not as effective for pain as he would like, but still trying to give it time.     If okay to delay taper, nurses would need new order - okay for verbal order but would need paper order faxed to nursing agency as well.     Marci -  # [phone]  "

## 2020-01-14 NOTE — LETTER
RE:   Ernesto RAMSAY Mehdi   [address] [place] [facility] [address]      1/15/2020      Okay for patient to continue oxycodone 2.5mg AM, 5mg midday, 5mg PM through 1/15/2020. On 1/16/2020 decrease dose to 2.5mg AM, 2.5mg midday, and 5mg PM x 7 days, then decrease to 2.5mg TID.       If you have any questions or concerns please contact the clinic at [phone].    Sincerely,       Nerissa Arellano MD

## 2020-01-14 NOTE — LETTER
Re: Ernesto RAMSAY Mehdi  [email or web address] [place] [address]      1/15/2020    Okay for patient to take oxycodone 2.5mg AM, 5mg midday, 5mg PM through 1/15/2020. On 1/16/2020 decrease dose to 5mg AM, 2.5mg midday, 5mg PM x7 days, then 2.5mg TID.       If you have any questions or concerns please contact the clinic at [phone].    Sincerely,       Nerissa Arellano MD

## 2020-01-22 ENCOUNTER — TELEPHONE (OUTPATIENT)
Dept: PALLIATIVE CARE | Facility: CLINIC | Age: 30
End: 2020-01-22

## 2020-01-22 DIAGNOSIS — R07.81 RIB PAIN ON LEFT SIDE: Primary | ICD-10-CM

## 2020-01-22 NOTE — LETTER
RE:  Mr. Ernesto Harding   [address]   [place] [facility] [address]      1/22/2020      For rib area pain use 1000mg Tyelnol QID, 600mg ibuprofen QID + diclofenac gel 2G QID PRN to rib area.       If you have any questions or concerns please contact the clinic at [phone].    Sincerely,       Nerissa Arellano MD

## 2020-01-22 NOTE — TELEPHONE ENCOUNTER
Received VM from patient's home care nurse. Patient is nervous about decrease of oxycodone. Has been complaining of rib pain - xray was done and this did not show a fracture. Per home care nurse, patient's care team thinks this could be related to vit D deficiency? Currently patient is on low end of normal, adjusting patient's vit D to try to raise this level.     Patient reporting pain increased up to 7/10 in rib. Per home care nurse all other pain helped by methocarbamol. She notes that they are trying to get an apt with the MD that diagnosed patient's rib fracture previously.    Patient asking if next dose decrease can be delayed until rib is worked up and improving.     --    Discussed with Dr. Arellano - recommend patient max out on tylenol and ibuprofen and add in diclofenac gel. Oxycodone not most effective for bone pain. Recommendations below:     1000mg Tyelnol QID, 600mg ibuprofen QID + diclofenac gel PRN    --    Home care nurse is Marci, phone #[phone]    Called Marci and advised of above instructions. She verbalized understanding and agreement. Patient is due to decrease oxycodone tomorrow to 2.5mg TID.     Script sent to patient's preferred pharmacy - Marci also requests updated orders be sent to home care agency: [facility] (attn: Sriram, fax #[phone]).

## 2020-01-29 ENCOUNTER — TELEPHONE (OUTPATIENT)
Dept: PALLIATIVE CARE | Facility: CLINIC | Age: 30
End: 2020-01-29

## 2020-01-29 DIAGNOSIS — M79.2 NEUROPATHIC PAIN: Primary | ICD-10-CM

## 2020-01-29 DIAGNOSIS — R07.81 RIB PAIN: ICD-10-CM

## 2020-01-29 NOTE — TELEPHONE ENCOUNTER
Received VM from patient's home care RN Marci (phone #[phone]). She is looking for updated orders for patient's pain med. They know that tomorrow it is dropping to  2.5mg TID - would like to know if we are holding at this dose/if we are planning to further reduce this dose and what that will look like. Will check with MD and call back with instructions.    Patient scheduled for follow up 2/13/2020.

## 2020-01-29 NOTE — LETTER
RE:  Mr. Ernesto Harding   [address] [place] [facility] [address]      1/30/2020      Oxycodone 2.5mg BID starting today x 7 days, then discontinue oxycodone. Please also start gabapentin per attached prescription - orders have been verbally given to home care nurse and prescription has been sent to pharmacy.    If you have any questions or concerns please contact the clinic at [phone].    Sincerely,       Nerissa Arellano MD

## 2020-01-30 RX ORDER — GABAPENTIN 100 MG/1
CAPSULE ORAL
Qty: 81 CAPSULE | Refills: 1 | Status: SHIPPED | OUTPATIENT
Start: 2020-01-30 | End: 2020-03-05

## 2020-01-30 NOTE — TELEPHONE ENCOUNTER
MD would like to start gabapentin - Take 1 capsule (100 mg) by mouth At Bedtime for 3 days, THEN 2 capsules (200 mg) At Bedtime for 3 days, THEN 3 capsules (300 mg) At Bedtime.     Called patient's home care nurse and reviewed instructions. Also spoke with nurse and patient via speaker phone and reviewed side effects. Patient asked if he can take evening cannabis now with evening gabapentin - MD is okay with this, patient updated.     Script faxed to pharmacy and updated orders for oxycodone and gabapentin faxed to home care agency: [facility] (attn: Sriram, fax #[phone]).

## 2020-01-30 NOTE — TELEPHONE ENCOUNTER
Has been recommended gabapentin by multiple providers for thought rib pain may be nerve related.  I think is reasonable to start while we're tapering off oxycodone.    - Start gabapentin 100 mg QHS, increase 100 mg every 3 days to 300 mg QHS  - Will see him on this dose and adjust from there.      Nerissa Arellano MD  Palliative Medicine  Pager [phone]

## 2020-01-30 NOTE — TELEPHONE ENCOUNTER
Called patient's home care nurse - currently patient has been taking 2.5mg oxycodone BID as of today. Advised patient should continue this dose for 1 week and then stop oxycodone all together. She verbalized understanding.     She also tells me that patient has asked about gabapentin - this was discussed with him a the last apt and they report several other MDs have suggested it. She shares the rib pain is thought to be neuropathic. Advised I would check with Dr. Arellano and get back to her.     New orders need to be faxed to homecare agency - they do not need more oxycodone.

## 2020-02-13 ENCOUNTER — OFFICE VISIT (OUTPATIENT)
Dept: PALLIATIVE CARE | Facility: CLINIC | Age: 30
End: 2020-02-13
Attending: INTERNAL MEDICINE
Payer: COMMERCIAL

## 2020-02-13 VITALS
SYSTOLIC BLOOD PRESSURE: 92 MMHG | HEART RATE: 77 BPM | DIASTOLIC BLOOD PRESSURE: 61 MMHG | OXYGEN SATURATION: 99 % | TEMPERATURE: 98.1 F | WEIGHT: 107 LBS

## 2020-02-13 DIAGNOSIS — G71.01 DMD (DUCHENNE MUSCULAR DYSTROPHY) (H): Primary | ICD-10-CM

## 2020-02-13 DIAGNOSIS — M62.838 MUSCLE SPASM: ICD-10-CM

## 2020-02-13 DIAGNOSIS — Z93.0 TRACHEOSTOMY PRESENT (H): ICD-10-CM

## 2020-02-13 DIAGNOSIS — R07.81 RIB PAIN ON LEFT SIDE: ICD-10-CM

## 2020-02-13 DIAGNOSIS — Z93.1 FEEDING BY G-TUBE (H): ICD-10-CM

## 2020-02-13 DIAGNOSIS — Z87.81 HISTORY OF VERTEBRAL FRACTURE: ICD-10-CM

## 2020-02-13 PROCEDURE — 99215 OFFICE O/P EST HI 40 MIN: CPT | Mod: GC | Performed by: STUDENT IN AN ORGANIZED HEALTH CARE EDUCATION/TRAINING PROGRAM

## 2020-02-13 PROCEDURE — G0463 HOSPITAL OUTPT CLINIC VISIT: HCPCS | Mod: ZF

## 2020-02-13 RX ORDER — DIAZEPAM ORAL SOLUTION (CONCENTRATE) 5 MG/ML
SOLUTION ORAL
Qty: 4 ML | Refills: 0 | Status: SHIPPED | OUTPATIENT
Start: 2020-02-13 | End: 2020-08-06

## 2020-02-13 RX ORDER — GABAPENTIN 250 MG/5ML
300 SOLUTION ORAL AT BEDTIME
Qty: 180 ML | Refills: 1 | Status: SHIPPED | OUTPATIENT
Start: 2020-02-13 | End: 2020-03-05

## 2020-02-13 RX ORDER — OXYCODONE HCL 5 MG/5 ML
2.5 SOLUTION, ORAL ORAL DAILY PRN
Qty: 10 ML | Refills: 0 | Status: SHIPPED | OUTPATIENT
Start: 2020-02-13 | End: 2020-03-05

## 2020-02-13 ASSESSMENT — PAIN SCALES - GENERAL: PAINLEVEL: NO PAIN (0)

## 2020-02-13 NOTE — PROGRESS NOTES
[facility]all[facility] Outpatient Clinic Progress Note    Patient Name: Ernesto Harding  Primary Provider:  Arianne Weeks    Chief Complaint: Duchenne's Muscular Dystrophy, Chronic Pain    Interim History:  Ernesto Harding is a 29 year old male with history of Duchenne's Muscular Dystrophy, chronic rib pain, and recurrent vertebral fractures. He previously was seen at [facility].     At previous visit, discussed we would continue with oxycodone taper but at a slower rate. We also requested input from Ernesto's Cardiologist re: starting a muscle relaxer.     In interim, they received OK from Cardiology to start robaxin, so we initiated this at 500mg QID. Continued with oxycodone taper-per modified scheduled, has been off oxycodone since 2/5. Initiated gabapentin at bedtime on 1/30 (started at 100mg with plans to increase to 300mg within 9 days).     Today, reports neck pain has completely resolved with robaxin. Also notes the gabapentin has been incredibly helpful with his overall pain and he is sleeping better at night. Would like to hold this dose for now in hopes he can tolerate adding an additional dose in the future.    Rib pain still present though perhaps not flaring as frequently. Still has about 1-3 flares up to 5-7/10 pain per day. These last about 30 min-1 hours and generally improve with repositioning. Has not felt like he needed oxycodone to help with this.    Main concern today is pain with trach changes (Q2 weeks) and with J-tube changes (Q3 months). He and homecare team are wondering about something he can take to help with the pain of both these procedures. He is not able to have anything in the J-tube 2 hours prior to those changes.     Stopped medical cannabis has not needed with Gabapentin addition.     Coping:  Finds chucho in watching sports and other TV shows, as well as reading. They are working on getting him a device that would allow him to resume kerry (has struggled with strength  in his arms/hands). Also notes his large number of friends and his care team are a source of strength for him.    Social History:  Pertinent changes to social history/social situation since last visit: N/A  Key support resources: Multiple friends and family members, near round the clock nursing  Advance Directive Status: Does not have a formal HCD but did complete a POLST previously. States that he is DNR but otherwise full treatment. They will work on getting us a copy.  Social History     Tobacco Use     Smoking status: Never Smoker     Smokeless tobacco: Never Used   Substance Use Topics     Alcohol use: None     Drug use: None     Allergies   Allergen Reactions     Duloxetine Nausea and Vomiting     Pollen Extract      Medications- Reviewed in Epic.    Past Medical History- Reviewed in [facility].    Past Surgical History- Reviewed in Epic.    Review of Systems:   ROS: 10 point ROS neg other than the symptoms noted above in the HPI.    Physical Exam:   BP 92/61   Pulse 77   Temp 98.1  F (36.7  C) (Tympanic)   Wt 48.5 kg (107 lb)   SpO2 99%   Constitutional: Thin, sitting in wheelchair.  HEENT: No head abnormalities. Sclera non-icteric, no nasal discharge. MMM. Ears grossly normal.  Neck: Trach in place.   Respiratory: On ventilator  Gastrointestinal: G-tube in place and site is c/d/i.   Musculoskeletal: Diminished muscle bulk throughout. Tender to palpation over left anterior chest about ribs 7-8.  Skin: no suspicious lesions or rashes  Psychiatric: mentation appears normal and affect normal/bright    Key Data Reviewed: None new since prior visit    [place]  reviewed and fills consistent with history: Yes    [facility] Outpatient Palliative Care Opioid Prescribing Safety Plan   Palliative safety education performed during clinic visit 01/02/2020  Opioid risk assessment performed 01/02/2020. ORT score: 1  Mood disorder assessment performed 01/02/2020.      Prognosis likely >1 year  Opioid indication is weak  Opioid  risk is low (ORT < 4)  Tapering or referral plan: Tapered down from 37.5mg oxycodone daily to 15mg daily. Previously decreased by 5mg each week, but we slowed this to decreasing by 2.5mg every week. Has been off daily oxycodone since 2/5/20.    Impression & Recommendations & Counseling:  Ernesto Harding is a 29 year old male with history of Duchenne's Muscular Dystrophy, chronic rib pain, and recurrent vertebral fractures who was previously followed by Palliative Care at [facility]. Since July 2019, he has been trach dependent and also now uses a G-tube. He has persistent pain in his neck, chest, and upper abdomen near his G-tube, as well as regular pain in his arms and legs. At his last appointment with [facility] in November, a taper of his oxycodone was initiated at 5mg decrease/week. This had been going OK until the decrease from 4x daily to 3x daily. Ernesto, his family, and his home care team had been concerned about continuing the current taper schedule without additional plans for pain management. Ernesto expressed several times his desire to be comfortable for more time during the day yet recognized that opioids are ultimately not the best way to achieve this.     Has done well with starting gabapentin as well as addition of robaxin. Plan to continue both at current doses. Hope is that he will adjust to the sedating effect of gabapentin so that we can add in some doses during the day. Discussed that they can call in to let us know that he is ready to try a daytime dose. Would advise adding in a daytime dose of 100mg for at least 1 week to give him some time to adjust to the side effects. If he tolerates this, could try increasing to 200mg during the day. Would continue the 300mg at bedtime for now.    Agree that having something for pain prior to both trach and J-tube changes. Will do 2.5mg of oxycodone prior to trach changes (this is a total of 5mg/month). Prior to J-tube changes, will try valium. Dosing  ordered 5mg 2-3 hours prior to J-tube change, may repeat x1. Recommended they give the 1st dose 3 hours prior, so that if it is not sufficient for him, he can take the 2nd dose right at 2 hours prior to the procedure.     They will follow up after his next J-tub replacement, which is scheduled sometime at end of March or early April.    Patient seen and discussed with Dr. Ascencion Hidalgo.    Vicky Colvin, DO  Palliative Medicine Fellow    Attending Note:  Patient seen and evaluated with Dr Colvin and I agree with/confirm their findings/recs in this note.      Thank you for involving us in the patient's care.   Ascencion Hidalgo MD / Palliative Medicine / Text me via [facility] - search for '[place]' - then click the pager icon / My clinic is in the [facility]: [phone] (scheduling); [phone] (triage). [facility] Inpatient Team Consult Pager [phone] (answered 8am-430pm M-F) - prefer text pages via [employer] / Palliative After-Hours Answering Service [phone].

## 2020-02-13 NOTE — LETTER
2/13/2020       RE: Ernesto Harding  [address]  [place] [address]     Dear Colleague,    Thank you for referring your patient, Ernesto Harding, to the [facility] Please see a copy of my visit note below.    vPalliative Care Outpatient Clinic Progress Note    Patient Name: Ernesto Harding  Primary Provider:  Arianne Weeks    Chief Complaint: Duchenne's Muscular Dystrophy, Chronic Pain    Interim History:  Ernesto Harding is a 29 year old male with history of Duchenne's Muscular Dystrophy, chronic rib pain, and recurrent vertebral fractures.  He previously was seen at [facility] for Palliative Care.     At previous visit, discussed we would continue with oxycodone taper but at a slower rate. We also requested input from Ernesto's Cardiologist re: starting a muscle relaxer.     In interim, they received OK from Cardiology to start robaxin, so we initiated this at 500mg QID. Continued with oxycodone taper-per modified scheduled, has been off oxycodone since 2/5. Initiated gabapentin at bedtime on 1/30 (started at 100mg with plans to increase to 300mg within 9 days).     Today, reports neck pain has completely resolved with robaxin. Also notes the gabapentin has been incredibly helpful with his overall pain and he is sleeping better at night. Would like to hold this dose for now in hopes he can tolerate adding an additional dose in the future.    Rib pain still present though perhaps not flaring as frequently. Still has about 1-3 flares up to 5-7/10 pain per day. These last about 30 min-1 hours and generally improve with repositioning. Has not felt like he needed oxycodone to help with this.    Main concern today is pain with trach changes (Q2 weeks) and with J-tube changes (Q3 months). He and homecare team are wondering about something he can take to help with the pain of both these procedures. He is not able to have anything in the J-tube 2 hours  prior to those changes.     Stopped medical cannabis has not needed with Gabapentin addition.     Coping:  Finds chucho in watching sports and other TV shows, as well as reading. They are working on getting him a device that would allow him to resume kerry (has struggled with strength in his arms/hands). Also notes his large number of friends and his care team are a source of strength for him.    Social History:  Pertinent changes to social history/social situation since last visit: N/A  Key support resources: Multiple friends and family members, near round the clock nursing  Advance Directive Status: Does not have a formal HCD but did complete a POLST previously. States that he is DNR but otherwise full treatment. They will work on getting us a copy.  Social History     Tobacco Use     Smoking status: Never Smoker     Smokeless tobacco: Never Used   Substance Use Topics     Alcohol use: None     Drug use: None     Allergies   Allergen Reactions     Duloxetine Nausea and Vomiting     Pollen Extract      Medications- Reviewed in Epic.    Past Medical History- Reviewed in [facility].    Past Surgical History- Reviewed in Epic.    Review of Systems:   ROS: 10 point ROS neg other than the symptoms noted above in the HPI.    Physical Exam:   BP 92/61   Pulse 77   Temp 98.1  F (36.7  C) (Tympanic)   Wt 48.5 kg (107 lb)   SpO2 99%   Constitutional: Thin, sitting in wheelchair.  HEENT: No head abnormalities. Sclera non-icteric, no nasal discharge. MMM. Ears grossly normal.  Neck: Trach in place.   Respiratory: On ventilator  Gastrointestinal: G-tube in place and site is c/d/i.   Musculoskeletal: Diminished muscle bulk throughout. Tender to palpation over left anterior chest about ribs 7-8.  Skin: no suspicious lesions or rashes  Psychiatric: mentation appears normal and affect normal/bright    Key Data Reviewed: None new since prior visit    [place]  reviewed and fills consistent with history: Yes    [facility]  Palliative Care Opioid Prescribing Safety Plan   Palliative safety education performed during clinic visit 01/02/2020  Opioid risk assessment performed 01/02/2020. ORT score: 1  Mood disorder assessment performed 01/02/2020.      Prognosis likely >1 year  Opioid indication is weak  Opioid risk is low (ORT < 4)  Tapering or referral plan: Tapered down from 37.5mg oxycodone daily to 15mg daily. Previously decreased by 5mg each week, but we slowed this to decreasing by 2.5mg every week. Has been off daily oxycodone since 2/5/20.    Impression & Recommendations & Counseling:  Ernesto Harding is a 29 year old male with history of Duchenne's Muscular Dystrophy, chronic rib pain, and recurrent vertebral fractures who was previously followed by Palliative Care at [facility]. Since July 2019, he has been trach dependent and also now uses a G-tube. He has persistent pain in his neck, chest, and upper abdomen near his G-tube, as well as regular pain in his arms and legs. At his last appointment with Brice in November, a taper of his oxycodone was initiated at 5mg decrease/week. This had been going OK until the decrease from 4x daily to 3x daily. Ernesto, his family, and his home care team had been concerned about continuing the current taper schedule without additional plans for pain management. Ernesto expressed several times his desire to be comfortable for more time during the day yet recognized that opioids are ultimately not the best way to achieve this.     Has done well with starting gabapentin as well as addition of robaxin. Plan to continue both at current doses. Hope is that he will adjust to the sedating effect of gabapentin so that we can add in some doses during the day. Discussed that they can call in to let us know that he is ready to try a daytime dose. Would advise adding in a daytime dose of 100mg for at least 1 week to give him some time to adjust to the side effects. If he tolerates this, could try  increasing to 200mg during the day. Would continue the 300mg at bedtime for now.    Agree that having something for pain prior to both trach and J-tube changes. Will do 2.5mg of oxycodone prior to trach changes (this is a total of 5mg/month). Prior to J-tube changes, will try valium. Dosing ordered 5mg 2-3 hours prior to J-tube change, may repeat x1. Recommended they give the 1st dose 3 hours prior, so that if it is not sufficient for him, he can take the 2nd dose right at 2 hours prior to the procedure.     They will follow up after his next J-tub replacement, which is scheduled sometime at end of March or early April.    Patient seen and discussed with Dr. Ascencion Hidalgo.    Vicky Colvin,   Palliative Medicine Fellow    Attending Note:  Patient seen and evaluated with Dr Colvin and I agree with/confirm their findings/recs in this note.      Thank you for involving us in the patient's care.   Ascencion Hidalgo MD / Palliative Medicine / Text me via [employer] - search for '[place]' - then click the pager icon / My clinic is in the [facility]: [phone] (scheduling); [phone] (triage). [facility] Inpatient Team Consult Pager [phone] (answered 8am-430pm M-F) - prefer text pages via [employer] / Palliative After-Hours Answering Service [phone].

## 2020-02-13 NOTE — NURSING NOTE
Oncology Rooming Note    February 13, 2020 2:18 PM   Ernesto Harding is a 29 year old male who presents for:    Chief Complaint   Patient presents with     Oncology Clinic Visit     [facility] RETURN;  Duchenne's Muscular Dystrophy, Chronic Pain     Initial Vitals: BP 92/61   Pulse 77   Temp 98.1  F (36.7  C) (Tympanic)   Wt 48.5 kg (107 lb)   SpO2 99%  There is no height or weight on file to calculate BMI. There is no height or weight on file to calculate BSA.  No Pain (0) Comment: Data Unavailable   No LMP for male patient.  Allergies reviewed: Yes  Medications reviewed: Yes    Medications: Medication refills not needed today.  Pharmacy name entered into EPIC: [facility] [address]    Clinical concerns: Finding things for tube changes.  Left rib pain, possible pinched nerve just above Gtube.  Dr. Jernigan was notified.      [facility]

## 2020-03-05 DIAGNOSIS — R07.81 RIB PAIN ON LEFT SIDE: ICD-10-CM

## 2020-03-05 DIAGNOSIS — M79.2 NEUROPATHIC PAIN: ICD-10-CM

## 2020-03-05 DIAGNOSIS — R07.81 RIB PAIN: ICD-10-CM

## 2020-03-05 DIAGNOSIS — G71.01 DMD (DUCHENNE MUSCULAR DYSTROPHY) (H): ICD-10-CM

## 2020-03-05 RX ORDER — GABAPENTIN 300 MG/1
300 CAPSULE ORAL AT BEDTIME
Qty: 30 CAPSULE | Refills: 3 | Status: SHIPPED | OUTPATIENT
Start: 2020-03-05 | End: 2020-04-22

## 2020-03-05 RX ORDER — OXYCODONE HCL 5 MG/5 ML
2.5 SOLUTION, ORAL ORAL DAILY PRN
Qty: 5 ML | Refills: 0 | Status: SHIPPED | OUTPATIENT
Start: 2020-03-12 | End: 2020-03-30

## 2020-03-05 NOTE — TELEPHONE ENCOUNTER
Received VM from patient's home care nurse requesting refills of oxycodone and gabapentin caps. She reports that gabapentin capsules are more effective for patient and they would like to change back to those instead of the liquid. Also patient only received 5ml of oxycodone at last fill as pharmacy would only fill 1 month supply, not 2. [place]  report confirms this.     Last refill oxycodone: 2/13/2020  Last refill gabapentin: 2/13/2020  Last office visit: 2/13/2020  Message to schedulers for follow up end of March/early April.    Will route request to MD for review.     Reviewed [place]  Report.

## 2020-03-15 ENCOUNTER — HEALTH MAINTENANCE LETTER (OUTPATIENT)
Age: 30
End: 2020-03-15

## 2020-03-23 ENCOUNTER — TELEPHONE (OUTPATIENT)
Dept: PALLIATIVE CARE | Facility: CLINIC | Age: 30
End: 2020-03-23

## 2020-03-23 DIAGNOSIS — G71.01 DMD (DUCHENNE MUSCULAR DYSTROPHY) (H): ICD-10-CM

## 2020-03-23 DIAGNOSIS — M79.2 NEUROPATHIC PAIN: Primary | ICD-10-CM

## 2020-03-23 DIAGNOSIS — R07.81 RIB PAIN: ICD-10-CM

## 2020-03-23 DIAGNOSIS — M62.838 MUSCLE SPASM: ICD-10-CM

## 2020-03-23 NOTE — TELEPHONE ENCOUNTER
Received VM from patient's home care nurse. Patient is asking if gabapentin can be increased as patient feels it is not longer working as long as it used to. He would prefer not to add a daytime dose and instead increase the at bedtime dose.     Will route to MDs for review.    Nurse's call back phone #[phone]

## 2020-03-25 NOTE — TELEPHONE ENCOUNTER
Per MDs, okay to increase to 600mg gabapentin at bedtime - slowly taper up: 400mg at bedtime x 1 week, 500mg x1 week, then 600mg at bedtime.     Called and spoke to home care nurse (patient as well via speaker phone). Advised of above recommendation, both in agreement and verbalized understanding of instructions. They will need 100mg caps to use along with 300mg caps. They are okay starting this tomorrow. Will pend script for MD approval tomorrow and then fax to pharmacy/ [facility] (attn: Sriram, fax #[phone]).

## 2020-03-26 RX ORDER — GABAPENTIN 100 MG/1
CAPSULE ORAL
Qty: 63 CAPSULE | Refills: 0 | Status: SHIPPED | OUTPATIENT
Start: 2020-03-26 | End: 2020-04-22

## 2020-03-30 DIAGNOSIS — G71.01 DMD (DUCHENNE MUSCULAR DYSTROPHY) (H): ICD-10-CM

## 2020-03-30 RX ORDER — OXYCODONE HCL 5 MG/5 ML
2.5 SOLUTION, ORAL ORAL DAILY PRN
Qty: 5 ML | Refills: 0 | Status: SHIPPED | OUTPATIENT
Start: 2020-04-09 | End: 2020-04-30

## 2020-03-30 NOTE — TELEPHONE ENCOUNTER
Received VM from patient's homecare nurse requesting refill of oxycodone for trach change in 2 weeks.    Last refill: 3/12/2020  Last office visit: 2/13/2020  Message sent to schedulers for assistance setting up RTN telephone visit with Escue in next few weeks for check in.      Will route request to MD for review.     Reviewed [place]  Report.

## 2020-04-13 DIAGNOSIS — R23.8 PAINFUL PERIWOUND SKIN: Primary | ICD-10-CM

## 2020-04-13 NOTE — TELEPHONE ENCOUNTER
Received VM from patient's home care nurse. She reports that patients gtube site has been extra sensitive with cares lately. He is due for a gtube change this Wednesday. They are wondering if in addition to the diazepam that he has for 2 hours pre-procedure there would be any way that he could be prescribed something for a one time PRN pain control post procedure as they worry that he will have quite a bit of discomfort.     Will route to MD for review.

## 2020-04-14 RX ORDER — LIDOCAINE HYDROCHLORIDE 20 MG/ML
JELLY TOPICAL 3 TIMES DAILY PRN
Qty: 30 ML | Refills: 3 | Status: SHIPPED | OUTPATIENT
Start: 2020-04-14

## 2020-04-14 NOTE — TELEPHONE ENCOUNTER
Per MD - I'm fine with doing 2.5 mg oxycodone, given after his G tube change if he's taking diazepam before.  They should also apply lidocaine gelly topically to the area.      Called home RN and advised of above info. She was able to take these as verbal orders. They have oxycodone 2.5mg in home right now, but will need a script for lidocaine jelly.     They also will send orders for MD to sign off on given these are verbal orders.     Home care RN suspect gtube site extra tender as she has been off for a little bit and other nurses filling in do not appear to have been as aggressive with cares and cleaning of this site. She notes this is improving, but they are grateful for the back up oxycodone if it is needed.

## 2020-04-14 NOTE — PROGRESS NOTES
"Ernesto Harding is a 29 year old male who is being evaluated via a billable video visit.      The patient has been notified of following:     \"This video visit will be conducted via a call between you and your physician/provider. We have found that certain health care needs can be provided without the need for an in-person physical exam.  This service lets us provide the care you need with a video conversation.  If a prescription is necessary we can send it directly to your pharmacy.  If lab work is needed we can place an order for that and you can then stop by our lab to have the test done at a later time.    Video visits are billed at different rates depending on your insurance coverage.  Please reach out to your insurance provider with any questions.    If during the course of the call the physician/provider feels a video visit is not appropriate, you will not be charged for this service.\"    Patient has given verbal consent for Video visit? Yes    How would you like to obtain your AVS? E-Mail (inform patient AVS not encrypted)    Patient would like the video invitation sent by: Send to e-mail at: [email or web address]      No new questions or concerns.   No refills needed.     Shyanne Gil, [facility]    Video Start Time: 11:54    Additional provider notes:     Palliative Care Outpatient Clinic Progress Note    Patient Name: Ernesto Harding  Primary Provider:  Arianne Weeks    Chief Complaint: Duchenne's Muscular Dystrophy, Chronic Pain    Last Palliative Care Appointment:  2/13/20 with Dr. Colvin    Interim History:  Ernesto Harding is a 29 year old male with history of Duchenne's Muscular Dystrophy, chronic rib pain, and recurrent vertebral fractures. He was previously followed at [facility] for palliative care, and was referred to us for continued symptom management due to age.      Since last visit, has been using oxycodone 2.5 mg with tracheostomy changes.  We have slowly gone up on gabapentin, " currently on 600 mg QHS.      Says overall his pain control has been really good.  Has been taking Robaxin 500 mg 3-4x/day for neck pain/spasm and this is doing well.  Not bothering him much on this regimen.  Gabapentin has helped with generalized pain, and could tell a difference with increase to 600 mg, feels this works well.  The rib pain has resolved with PT and time, and hasn't had any in about 3 weeks.      Bowels moving well, occasionally needs some senna based on what he eats.      No new concerns.  Appetite good, no nausea.  Mood has been well, sleeping well.      The dose of oxycodone with trach changes has been helpful.  G tube change got delayed.  Plan for dose of valium prior, and PRN oxycodone 2.5 mg x1 if needed for pain after.      Coping:  Says he is coping well with the stay-at-home order, has been able to get usual care and family sheltering with him are doing well.      Social History:  Pertinent changes to social history/social situation since last visit: N/A  Key support resources: Multiple friends and family members, near round the clock nursing  Advance Directive Status: Does not have a formal HCD but did complete a POLST previously. States that he is DNR but otherwise full treatment. They will work on getting us a copy.  Social History     Tobacco Use     Smoking status: Never Smoker     Smokeless tobacco: Never Used   Substance Use Topics     Alcohol use: Not on file     Drug use: Not on file     Allergies   Allergen Reactions     Duloxetine Nausea and Vomiting     Pollen Extract      Medications- Reviewed in Epic.    Past Medical History- Reviewed in [facility].    Past Surgical History- Reviewed in Epic.    Review of Systems:   ROS: 10 point ROS neg other than the symptoms noted above in the HPI.    Physical Exam:   Constitutional: Thin, sitting in own bed  HEENT:  Sclera non-icteric  Neck: Trach in place.   Respiratory: Normal work of breathing, able to phonate with strong voice with  valve  Psychiatric: mentation appears normal and affect normal/bright    Key Data Reviewed: None new since prior visit    MN  reviewed and fills consistent with history: Yes    [facility] Outpatient Palliative Care Opioid Prescribing Safety Plan   Palliative safety education performed during clinic visit 01/02/2020  Opioid risk assessment performed 01/02/2020. ORT score: 1  Mood disorder assessment performed 01/02/2020.      Prognosis likely >1 year  Opioid indication is weak  Opioid risk is low (ORT < 4)  Tapering or referral plan: Tapered down from 37.5mg oxycodone daily to 2.5 mg only with trach/J tube changes.  Has been off daily oxycodone since 2/5/20.    Impression & Recommendations & Counseling:  Ernesto Harding is a 29 year old male with history of Duchenne's Muscular Dystrophy, chronic rib pain, and recurrent vertebral fractures who was previously followed by Palliative Care at [facility]. Since July 2019, he has been trach dependent and also now uses a G-tube. He has persistent pain in his neck, chest, and upper abdomen near his G-tube, as well as regular pain in his arms and legs. Has been able to taper off daily opioids, and doing well with robaxin for muscle spasm in neck and gabapentin for generalized pain.      Generalized pain - continue gabapentin 600 mg QHS.  If worsening, would add small dose in AM.      Neck spasm - improved, taking robaxin TID-QID with good control.      Constipation - continue senna PRN    Pain w trach/J tube changes - will continue 2.5 mg oxycodone x1 with trach change, 5 mg diazepam 2-3 hours prior to G tube change and 2.5 mg oxycodone available if needed for pain josee-change.      RTC in 3 months    Nerissa Arellano MD  Palliative Medicine  Pager [phone]       Video-Visit Details    Type of service:  Video Visit    Video End Time (time video stopped): 12:05    Originating Location (pt. Location): Home    Distant Location (provider location):  [facility] CANCER  CLINIC     Mode of Communication:  Video Conference via [facility]

## 2020-04-16 ENCOUNTER — VIRTUAL VISIT (OUTPATIENT)
Dept: PALLIATIVE CARE | Facility: CLINIC | Age: 30
End: 2020-04-16
Attending: INTERNAL MEDICINE
Payer: COMMERCIAL

## 2020-04-16 DIAGNOSIS — M79.2 NEUROPATHIC PAIN: ICD-10-CM

## 2020-04-16 DIAGNOSIS — Z93.0 TRACHEOSTOMY PRESENT (H): ICD-10-CM

## 2020-04-16 DIAGNOSIS — Z93.1 FEEDING BY G-TUBE (H): ICD-10-CM

## 2020-04-16 DIAGNOSIS — G71.01 DMD (DUCHENNE MUSCULAR DYSTROPHY) (H): Primary | ICD-10-CM

## 2020-04-16 DIAGNOSIS — M62.838 MUSCLE SPASM: ICD-10-CM

## 2020-04-16 PROCEDURE — 99213 OFFICE O/P EST LOW 20 MIN: CPT | Mod: GT | Performed by: INTERNAL MEDICINE

## 2020-04-16 PROCEDURE — 40000114 ZZH STATISTIC NO CHARGE CLINIC VISIT

## 2020-04-22 DIAGNOSIS — M79.2 NEUROPATHIC PAIN: ICD-10-CM

## 2020-04-22 DIAGNOSIS — R07.81 RIB PAIN: ICD-10-CM

## 2020-04-22 RX ORDER — GABAPENTIN 300 MG/1
600 CAPSULE ORAL AT BEDTIME
Qty: 60 CAPSULE | Refills: 3 | Status: SHIPPED | OUTPATIENT
Start: 2020-04-22 | End: 2020-07-21

## 2020-04-22 NOTE — TELEPHONE ENCOUNTER
Received VM from patient's home care nurse requesting refill of gabapentin 600mg.     Last refill: 3/26/2020  Last office visit: 4/16/2020      Will route request to MD for review.     Reviewed MN  Report.

## 2020-04-30 ENCOUNTER — PATIENT OUTREACH (OUTPATIENT)
Dept: PALLIATIVE CARE | Facility: CLINIC | Age: 30
End: 2020-04-30

## 2020-04-30 DIAGNOSIS — G71.01 DMD (DUCHENNE MUSCULAR DYSTROPHY) (H): ICD-10-CM

## 2020-04-30 RX ORDER — OXYCODONE HCL 5 MG/5 ML
2.5 SOLUTION, ORAL ORAL DAILY PRN
Qty: 5 ML | Refills: 0 | Status: SHIPPED | OUTPATIENT
Start: 2020-04-30 | End: 2020-06-05

## 2020-04-30 NOTE — PROGRESS NOTES
Received VM from patient's home care nurse requesting refill of oxycodone.     Last refill: 4/9/2020  Last office visit: 4/16/2020  Follow up not scheduled yet, but will be scheduled for July per check out order.     Will route request to MD for review.     Reviewed MN  Report.

## 2020-05-13 ENCOUNTER — PATIENT OUTREACH (OUTPATIENT)
Dept: PALLIATIVE CARE | Facility: CLINIC | Age: 30
End: 2020-05-13

## 2020-05-13 DIAGNOSIS — M62.838 MUSCLE SPASM: ICD-10-CM

## 2020-05-13 DIAGNOSIS — M79.18 MUSCULOSKELETAL PAIN: ICD-10-CM

## 2020-05-13 DIAGNOSIS — R07.81 RIB PAIN ON LEFT SIDE: ICD-10-CM

## 2020-05-13 RX ORDER — METHOCARBAMOL 500 MG/1
500 TABLET, FILM COATED ORAL 4 TIMES DAILY PRN
Qty: 120 TABLET | Refills: 3 | Status: SHIPPED | OUTPATIENT
Start: 2020-05-13 | End: 2020-06-23

## 2020-05-13 NOTE — PROGRESS NOTES
Received fax from pharmacy requesting refill of methocarbamol.     Last refill: 1/8/2020  Last office visit: 4/16/2020       Will route request to MD for review.     Reviewed [place]  Report.

## 2020-06-05 ENCOUNTER — PATIENT OUTREACH (OUTPATIENT)
Dept: PALLIATIVE CARE | Facility: CLINIC | Age: 30
End: 2020-06-05

## 2020-06-05 DIAGNOSIS — G71.01 DMD (DUCHENNE MUSCULAR DYSTROPHY) (H): ICD-10-CM

## 2020-06-05 RX ORDER — OXYCODONE HCL 5 MG/5 ML
2.5 SOLUTION, ORAL ORAL DAILY PRN
Qty: 5 ML | Refills: 0 | Status: SHIPPED | OUTPATIENT
Start: 2020-06-05 | End: 2020-06-23

## 2020-06-05 NOTE — PROGRESS NOTES
Received VM from patient's home care nurse requesting refill of oxycodone.     Last refill: 4/30/2020  Last office visit: 4/16/2020  Message to schedulers for RTN apt in July     Will route request to MD for review.     Reviewed MN  Report.

## 2020-06-23 ENCOUNTER — PATIENT OUTREACH (OUTPATIENT)
Dept: PALLIATIVE CARE | Facility: CLINIC | Age: 30
End: 2020-06-23

## 2020-06-23 DIAGNOSIS — M62.838 MUSCLE SPASM: ICD-10-CM

## 2020-06-23 DIAGNOSIS — R07.81 RIB PAIN ON LEFT SIDE: ICD-10-CM

## 2020-06-23 DIAGNOSIS — G71.01 DMD (DUCHENNE MUSCULAR DYSTROPHY) (H): ICD-10-CM

## 2020-06-23 DIAGNOSIS — M79.18 MUSCULOSKELETAL PAIN: ICD-10-CM

## 2020-06-23 RX ORDER — METHOCARBAMOL 500 MG/1
500 TABLET, FILM COATED ORAL 4 TIMES DAILY PRN
Qty: 120 TABLET | Refills: 3 | Status: SHIPPED | OUTPATIENT
Start: 2020-06-23 | End: 2021-01-27

## 2020-06-23 RX ORDER — OXYCODONE HCL 5 MG/5 ML
2.5 SOLUTION, ORAL ORAL DAILY PRN
Qty: 5 ML | Refills: 0 | Status: SHIPPED | OUTPATIENT
Start: 2020-07-03 | End: 2020-07-21

## 2020-06-23 NOTE — PROGRESS NOTES
Received VM from patient's home care RN requesting refills of methocarbamol and oxycodone. Patient has trach change tonight and will be using last of oxycodone then, will need more for next change in 2 weeks.     Last refill methocarbamol: 5/13/2020  Last refill oxycodone: 6/5/2020  Last office visit: 4/16/2020  Scheduled for follow up 7/24/2020     Will route request to MD for review.     Reviewed MN  Report.    Home care nurse Paula, phone #[phone]

## 2020-07-21 ENCOUNTER — PATIENT OUTREACH (OUTPATIENT)
Dept: PALLIATIVE CARE | Facility: CLINIC | Age: 30
End: 2020-07-21

## 2020-07-21 DIAGNOSIS — G71.01 DMD (DUCHENNE MUSCULAR DYSTROPHY) (H): ICD-10-CM

## 2020-07-21 DIAGNOSIS — M79.2 NEUROPATHIC PAIN: ICD-10-CM

## 2020-07-21 DIAGNOSIS — R07.81 RIB PAIN: ICD-10-CM

## 2020-07-21 RX ORDER — GABAPENTIN 300 MG/1
600 CAPSULE ORAL AT BEDTIME
Qty: 60 CAPSULE | Refills: 3 | Status: SHIPPED | OUTPATIENT
Start: 2020-07-21 | End: 2020-12-23

## 2020-07-21 RX ORDER — OXYCODONE HCL 5 MG/5 ML
2.5 SOLUTION, ORAL ORAL DAILY PRN
Qty: 5 ML | Refills: 0 | Status: SHIPPED | OUTPATIENT
Start: 2020-07-21 | End: 2020-08-18

## 2020-07-21 NOTE — PROGRESS NOTES
Received VM from patient's home care nurse requesting refill of oxycodone. Also received fax from pharmacy requesting refill of patient's gabapentin.     Last refill oxycodone: 7/3/3030  Last refill gabapentin: 4/22/2020  Last office visit: 4/16/2020  Scheduled for follow up 7/24/2020     Will route request to MD for review.     Reviewed MN  Report.

## 2020-07-24 ENCOUNTER — VIRTUAL VISIT (OUTPATIENT)
Dept: PALLIATIVE CARE | Facility: CLINIC | Age: 30
End: 2020-07-24
Attending: INTERNAL MEDICINE
Payer: COMMERCIAL

## 2020-07-24 DIAGNOSIS — G71.01 DMD (DUCHENNE MUSCULAR DYSTROPHY) (H): Primary | ICD-10-CM

## 2020-07-24 DIAGNOSIS — Z93.0 TRACHEOSTOMY PRESENT (H): ICD-10-CM

## 2020-07-24 DIAGNOSIS — G89.4 CHRONIC PAIN SYNDROME: ICD-10-CM

## 2020-07-24 PROCEDURE — 40001009 ZZH VIDEO/TELEPHONE VISIT; NO CHARGE

## 2020-07-24 PROCEDURE — 99213 OFFICE O/P EST LOW 20 MIN: CPT | Mod: GT | Performed by: INTERNAL MEDICINE

## 2020-07-24 NOTE — LETTER
7/24/2020       RE: Ernesto Harding  [address]  [place] [address]     Dear Colleague,    Thank you for referring your patient, Ernesto Harding, to the [facility] CANCER CLINIC at [facility]. Please see a copy of my visit note below.    Ernesto Harding is a 30 year old male who is being evaluated via a billable video visit.      I have reviewed and updated the patient's allergies and medication list.    Concerns: No new concerns.   Refills: None needed.     Vitals - Patient Reported  Systolic (Patient Reported): 91  Diastolic (Patient Reported): 65  Temperature (Patient Reported): 97.8  F (36.6  C)  Pain Score: No Pain (0)      Shyanne Jessica, [facility]    Palliative Care Outpatient Clinic    (This note was transcribed using voice recognition software. While I review and edit the transcription, I may miss errors, and the software sometimes does unexpected capitalizations and formatting that I miss. Please let me know of any serious mistranscriptions and I will addend this note.)    Patient ID:  He has Duchenne's s/p trach/vent & JT    Referred/transferred to us from [facility] due to advancing age.     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     Robaxin, gabapentin 600 hs. 2.5 mg oxycodone prn with trach changes    Has 24/7 nursing    History:  He is seen alone today.  Overall he reports he is doing well.    Trach pain: This has basically resolved with taking a small dose of oxycodone when he has his trach changed every 2 weeks.  Denies side effects from that including constipation.    G-tube pain: We tried Valium which apparently did help.  However at his last change  the radiologist gave him some local lidocaine which provided any discomfort and he thinks he can continue that and so it is no longer worried about GT change pain.    He has some neck and arm pain daily in the morning that she takes 600 mg gabapentin for this at night.  This is been  quite effective but he worries his pain is getting a little worse again.  He wonders about increasing the gabapentin.  Denies side effects of gabapentin apart from tiredness, which is why he takes it at bedtime.    Mood and spirits are good.  Life has not changed much with the pandemic.  In the community a little less.    SH: Reviewed, unchanged    ROS: 5 system ROS negative    PE: There were no vitals taken for this visit.   Wt Readings from Last 3 Encounters:   02/13/20 48.5 kg (107 lb)     Alert young man sitting in a hospital bed in no acute distress.  Tracheostomy/ventilator.  Speech is slow and nearly normal through the trach.  Respirations unlabored, no cough, speaking full sentences.  Face is symmetric.  Affect is full, pleasant, interactive.  G-tube site is unremarkable to visual inspection on his belly.  Thin with advanced muscle loss.  Skin is pale, no obvious lesions.    Data reviewed:  I reviewed recent labs and imaging, my comments:  No new data.  Outside records reviewed caregiver-seeing physical therapy this spring.  Frequent phone contact with his primary care office.    Impression & Recommendations:    30-year-old man with Radha's muscular dystrophy complicated by chronic back and chest wall pain, and during tracheostomy changes, chronic neck and upper extremity pain.    No changes recommended today.  Grateful occasional oxycodone helps.  I think it is okay to increase the gabapentin if he wants to-he is thinking about this.  Tolerating meds well.  No safety concerns.  Follow-up in 3 months    Chart data reviewed today:  Advance care planning:     No family history on file.  No past medical history on file.  No past surgical history on file.  Allergies   Allergen Reactions     Duloxetine Nausea and Vomiting     Pollen Extract      Medications: I have reviewed the patient's medication profile.  MNPMP: with refills    Thank you for involving us in the patient's care.   Ascencion Hidalgo MD /  Palliative Medicine / Text me via [facility] - search for 'Rocky' - then click the pager icon / My clinic is in the [facility]: [phone] (scheduling); [phone] (triage). Palliative care [facility] outpatient care coordinator is Kat Yusuf RN: [phone]. Palliative After-Hours Answering Service: [phone].             Video-Visit Details    Type of service:  Video Visit    Video Start Time: 1120  Video End Time: 1133    Originating Location (pt. Location): Home    Distant Location (provider location):  home    Platform used for Video Visit: Joe

## 2020-07-24 NOTE — PROGRESS NOTES
"Ernesto Harding is a 30 year old male who is being evaluated via a billable video visit.      The patient has been notified of following:     \"This video visit will be conducted via a call between you and your physician/provider. We have found that certain health care needs can be provided without the need for an in-person physical exam.  This service lets us provide the care you need with a video conversation.  If a prescription is necessary we can send it directly to your pharmacy.  If lab work is needed we can place an order for that and you can then stop by our lab to have the test done at a later time.    Video visits are billed at different rates depending on your insurance coverage.  Please reach out to your insurance provider with any questions.    If during the course of the call the physician/provider feels a video visit is not appropriate, you will not be charged for this service.\"    Patient has given verbal consent for Video visit? Yes  How would you like to obtain your AVS? MyChart     If you are dropped from the video visit, the video invite should be resent to: Send to e-mail at: [email or web address]    Will anyone else be joining your video visit? No         I have reviewed and updated the patient's allergies and medication list.    Concerns: No new concerns.   Refills: None needed.     Vitals - Patient Reported  Systolic (Patient Reported): 91  Diastolic (Patient Reported): 65  Temperature (Patient Reported): 97.8  F (36.6  C)  Pain Score: No Pain (0)      Shyanne Jessica [facility]    Palliative Care Outpatient Clinic    (This note was transcribed using voice recognition software. While I review and edit the transcription, I may miss errors, and the software sometimes does unexpected capitalizations and formatting that I miss. Please let me know of any serious mistranscriptions and I will addend this note.)    Patient ID:  He has Duchenne's s/p trach/vent & JT    Referred/transferred to us from " [facility] due to advancing age.     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     Robaxin, gabapentin 600 hs. 2.5 mg oxycodone prn with trach changes    Has 24/7 nursing    History:  He is seen alone today.  Overall he reports he is doing well.    Trach pain: This has basically resolved with taking a small dose of oxycodone when he has his trach changed every 2 weeks.  Denies side effects from that including constipation.    G-tube pain: We tried Valium which apparently did help.  However at his last change  the radiologist gave him some local lidocaine which provided any discomfort and he thinks he can continue that and so it is no longer worried about GT change pain.    He has some neck and arm pain daily in the morning that she takes 600 mg gabapentin for this at night.  This is been quite effective but he worries his pain is getting a little worse again.  He wonders about increasing the gabapentin.  Denies side effects of gabapentin apart from tiredness, which is why he takes it at bedtime.    Mood and spirits are good.  Life has not changed much with the pandemic.  In the community a little less.    SH: Reviewed, unchanged    ROS: 5 system ROS negative    PE: There were no vitals taken for this visit.   Wt Readings from Last 3 Encounters:   02/13/20 48.5 kg (107 lb)     Alert young man sitting in a hospital bed in no acute distress.  Tracheostomy/ventilator.  Speech is slow and nearly normal through the trach.  Respirations unlabored, no cough, speaking full sentences.  Face is symmetric.  Affect is full, pleasant, interactive.  G-tube site is unremarkable to visual inspection on his belly.  Thin with advanced muscle loss.  Skin is pale, no obvious lesions.    Data reviewed:  I reviewed recent labs and imaging, my comments:  No new data.  Outside records reviewed caregiver-seeing physical therapy this spring.  Frequent phone contact with his primary care office.    Impression &  Recommendations:    30-year-old man with Radha's muscular dystrophy complicated by chronic back and chest wall pain, and during tracheostomy changes, chronic neck and upper extremity pain.    No changes recommended today.  Grateful occasional oxycodone helps.  I think it is okay to increase the gabapentin if he wants to-he is thinking about this.  Tolerating meds well.  No safety concerns.  Follow-up in 3 months    Chart data reviewed today:  Advance care planning:     No family history on file.  No past medical history on file.  No past surgical history on file.  Allergies   Allergen Reactions     Duloxetine Nausea and Vomiting     Pollen Extract      Medications: I have reviewed the patient's medication profile.  MNPMP: with refills    Thank you for involving us in the patient's care.   Ascencion Hidalgo MD / Palliative Medicine / Text me via [employer] - search for 'Rocky' - then click the pager icon / My clinic is in the [facility]: [phone] (scheduling); [phone] (triage). Palliative care [facility] outpatient care coordinator is Kat Yusuf RN: [phone]. Palliative After-Hours Answering Service: [phone].             Video-Visit Details    Type of service:  Video Visit    Video Start Time: 1120  Video End Time: 1133    Originating Location (pt. Location): Home    Distant Location (provider location):  home    Platform used for Video Visit: Joe

## 2020-08-05 ENCOUNTER — PATIENT OUTREACH (OUTPATIENT)
Dept: PALLIATIVE CARE | Facility: CLINIC | Age: 30
End: 2020-08-05

## 2020-08-05 NOTE — PROGRESS NOTES
Received VM from Nara with patient's home care team. She would like an order to discontinue patient's xanax script as he has not found it effective for GJ exchanges. She states he did discuss this with the doctor at his last visit as well, they just need a formal order.     Attempted to call her back - but was unable to reach her. Left generic VM as was unclear if was confidential VM.     She did call back and leave me a VM asking me to call clinical manager Sriram at #[phone] tomorrow to give order. Will call tomorrow.    Xanax discontinued in Epic.

## 2020-08-06 NOTE — PROGRESS NOTES
Spoke with patient's care manage Sriram as requested. He reports that patient was using diazepam before GJ tube changes for anxiety - but it has not been helpful anymore so patient requests to discontinue.     Gave verbal order to discontinue diazepam - this order will be faxed for MD signature.

## 2020-08-18 ENCOUNTER — PATIENT OUTREACH (OUTPATIENT)
Dept: PALLIATIVE CARE | Facility: CLINIC | Age: 30
End: 2020-08-18

## 2020-08-18 DIAGNOSIS — G71.01 DMD (DUCHENNE MUSCULAR DYSTROPHY) (H): ICD-10-CM

## 2020-08-18 RX ORDER — OXYCODONE HCL 5 MG/5 ML
2.5 SOLUTION, ORAL ORAL DAILY PRN
Qty: 5 ML | Refills: 0 | Status: SHIPPED | OUTPATIENT
Start: 2020-08-18 | End: 2020-09-23

## 2020-08-18 NOTE — PROGRESS NOTES
Received VM from patient's home care nurse requesting refill of oxycodone.     Last refill: 7/21/2020  Last office visit: 7/24/2020     Will route request to MD for review.     Reviewed [place]  Report.

## 2020-09-23 ENCOUNTER — PATIENT OUTREACH (OUTPATIENT)
Dept: PALLIATIVE CARE | Facility: CLINIC | Age: 30
End: 2020-09-23

## 2020-09-23 DIAGNOSIS — G71.01 DMD (DUCHENNE MUSCULAR DYSTROPHY) (H): ICD-10-CM

## 2020-09-24 RX ORDER — OXYCODONE HCL 5 MG/5 ML
2.5 SOLUTION, ORAL ORAL DAILY PRN
Qty: 5 ML | Refills: 0 | Status: SHIPPED | OUTPATIENT
Start: 2020-09-24 | End: 2020-09-28

## 2020-09-28 ENCOUNTER — PATIENT OUTREACH (OUTPATIENT)
Dept: PALLIATIVE CARE | Facility: CLINIC | Age: 30
End: 2020-09-28

## 2020-09-28 DIAGNOSIS — G71.01 DMD (DUCHENNE MUSCULAR DYSTROPHY) (H): ICD-10-CM

## 2020-09-28 RX ORDER — OXYCODONE HCL 5 MG/5 ML
2.5 SOLUTION, ORAL ORAL DAILY PRN
Qty: 5 ML | Refills: 0 | Status: SHIPPED | OUTPATIENT
Start: 2020-09-28 | End: 2020-10-20

## 2020-09-28 NOTE — PROGRESS NOTES
Receives oxycodone 2.5mg with each trach exchange.  His trach was changed this weekend and as reported by the home care care nurse, there was a med error/ spillage and they do not have enough to complete next exchange.  Requesting an early refill as last filled on 9/24/2020 for a 28 day supply      reviewed : yes

## 2020-10-09 NOTE — PROGRESS NOTES
"Ernesto Harding is a 30 year old male who is being evaluated via a billable video visit.      The patient has been notified of following:     \"This video visit will be conducted via a call between you and your physician/provider. We have found that certain health care needs can be provided without the need for an in-person physical exam.  This service lets us provide the care you need with a video conversation.  If a prescription is necessary we can send it directly to your pharmacy.  If lab work is needed we can place an order for that and you can then stop by our lab to have the test done at a later time.    Video visits are billed at different rates depending on your insurance coverage.  Please reach out to your insurance provider with any questions.    If during the course of the call the physician/provider feels a video visit is not appropriate, you will not be charged for this service.\"    Patient has given verbal consent for Video visit? Yes  How would you like to obtain your AVS? MyChart  If you are dropped from the video visit, the video invite should be resent to: Send to e-mail at: [email or web address]  Will anyone else be joining your video visit? No        TABITHA Rob      Palliative Care Progress Note    Patient Name: Ernesto Harding  Primary Provider: Arianne Weeks    Chief Complaint/Patient ID:   He has Duchenne's s/p trach/vent & JT     Referred/transferred to us from [place] [facility] due to advancing age.      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      Robaxin, gabapentin 600 hs. 2.5 mg oxycodone prn with trach changes     Has 24/7 nursing.    Last Palliative care appointment: 7/24/20 with Dr. Hidalgo. Recommendations:   \"No changes recommended today.  Grateful occasional oxycodone helps.  I think it is okay to increase the gabapentin if he wants to-he is thinking about this.  Tolerating meds well.  No safety concerns.  Follow-up in 3 months\"     Reviewed: " Yes, no concerns.    Interim History:  Ernesto Harding 30 year old male is seen today for follow up with Palliative Care via billable video visit.     Overall he reports things have been good.  Trach changes have gone okay with the 2.5 mg of oxycodone, however, he still has discomfort.  He is wondering if this can be increased to 5 mg for trach changes as well as his G-tube changes.    He has been taking 600 mg gabapentin at bedtime due to increased pain in the morning.  This has helped, but he is noticing the pain starting to creep back in over the last month.  Wondering about a medication adjustment to help with this.    His mother had tested positive for Covid, however despite her having been around him, neither he nor anyone else in his house tested positive.      Overall he feels his mood has been good.  Continues to have his in-home staff doing physical therapy and range of motion exercises with him daily.     Social History:  No changes. Continues to have 24/7 in the form of family and friends/PCAs.  Social History     Tobacco Use     Smoking status: Never Smoker     Smokeless tobacco: Never Used   Substance Use Topics     Alcohol use: Not on file     Drug use: Not on file     Family History- Reviewed in Epic.     Allergies   Allergen Reactions     Duloxetine Nausea and Vomiting     Pollen Extract      Medications- Reviewed in Epic.    Past Medical History- Reviewed in [employer].    Past Surgical History- Reviewed in Epic.    Review of Systems:   ROS: 10 point ROS neg other than the symptoms noted above in the HPI.      Physical Exam:   Constitutional: Alert young man sitting in a hospital bed in no acute distress.   ENT: Tracheostomy/ventilator.  Speech is slow and nearly normal through the trach.    Respiratory: Respirations unlabored, no cough, speaking full sentences.   MSK: Thin with advanced muscle loss.  Skin:  Skin is pale, no obvious lesions. G-tube site is unremarkable to visual inspection on his belly.    Neuro: Face is symmetric.    Psych: Affect is full, pleasant, interactive.        Key Data Reviewed: No new imaging or labs since last visit.    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     Based on his level of discomfort during trach and J-tube changes, I agree that giving him the ability to go up to 5 mg for each change makes sense.  Will increase prescription to allow for 5 mg dose 30 minutes prior to these exchanges.    We will trial adding 100 mg of gabapentin in the morning, and he can increase this to 200 mg after 5 days.  We will see if this helps his pain that is related to the morning stiffness.  If this does not provide enough benefit, we could look at increasing his bedtime dose further to see if we get a more sustained effect.    Follow-up in 3 to 4 months for a medication check, sooner if needed.  We can make simple adjustments to the gabapentin over the phone.      Video-Visit Details  Video Start Time:  11:34 AM  Video End Time:  11:54 AM    Originating Location (pt. Location): Home     Distant Location (provider location): [facility]      Platform used for Video Visit: Joe Colvin,   Palliative Medicine   Pager [phone], [facility] ID [id number]

## 2020-10-20 ENCOUNTER — VIRTUAL VISIT (OUTPATIENT)
Dept: PALLIATIVE CARE | Facility: CLINIC | Age: 30
End: 2020-10-20
Attending: INTERNAL MEDICINE
Payer: COMMERCIAL

## 2020-10-20 DIAGNOSIS — R07.81 RIB PAIN: ICD-10-CM

## 2020-10-20 DIAGNOSIS — M79.18 MUSCULOSKELETAL PAIN: Primary | ICD-10-CM

## 2020-10-20 DIAGNOSIS — Z93.0 TRACHEOSTOMY PRESENT (H): ICD-10-CM

## 2020-10-20 DIAGNOSIS — M62.838 MUSCLE SPASM: ICD-10-CM

## 2020-10-20 DIAGNOSIS — Z79.891 ENCOUNTER FOR LONG-TERM USE OF OPIATE ANALGESIC: ICD-10-CM

## 2020-10-20 DIAGNOSIS — Z93.4 JEJUNOSTOMY TUBE PRESENT (H): ICD-10-CM

## 2020-10-20 DIAGNOSIS — G71.01 DMD (DUCHENNE MUSCULAR DYSTROPHY) (H): ICD-10-CM

## 2020-10-20 DIAGNOSIS — Z51.5 ENCOUNTER FOR PALLIATIVE CARE: ICD-10-CM

## 2020-10-20 PROCEDURE — 99214 OFFICE O/P EST MOD 30 MIN: CPT | Mod: GT | Performed by: STUDENT IN AN ORGANIZED HEALTH CARE EDUCATION/TRAINING PROGRAM

## 2020-10-20 PROCEDURE — 999N001193 HC VIDEO/TELEPHONE VISIT; NO CHARGE

## 2020-10-20 RX ORDER — GABAPENTIN 100 MG/1
CAPSULE ORAL
Qty: 55 CAPSULE | Refills: 1 | Status: SHIPPED | OUTPATIENT
Start: 2020-10-20 | End: 2020-12-23

## 2020-10-20 RX ORDER — OXYCODONE HCL 5 MG/5 ML
5 SOLUTION, ORAL ORAL DAILY PRN
Qty: 20 ML | Refills: 0 | Status: SHIPPED | OUTPATIENT
Start: 2020-10-20 | End: 2020-11-24

## 2020-10-20 NOTE — LETTER
"10/20/2020       RE: Ernesto Harding  [address]  [place] [address]     Dear Colleague,    Thank you for referring your patient, Ernesto Harding, to the [facility] CLINIC at [facility]. Please see a copy of my visit note below.    Palliative Care Progress Note    Patient Name: Ernesto Harding  Primary Provider: Arianne Weeks    Chief Complaint/Patient ID:   He has Duchenne's s/p trach/vent & JT     Referred/transferred to us from [place] [facility] due to advancing age.      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      Robaxin, gabapentin 600 hs. 2.5 mg oxycodone prn with trach changes     Has 24/7 nursing.    Last Palliative care appointment: 7/24/20 with Dr. Hidalgo. Recommendations:   \"No changes recommended today.  Grateful occasional oxycodone helps.  I think it is okay to increase the gabapentin if he wants to-he is thinking about this.  Tolerating meds well.  No safety concerns.  Follow-up in 3 months\"     Reviewed: Yes, no concerns.    Interim History:  Ernesto Harding 30 year old male is seen today for follow up with Palliative Care via billable video visit.       Overall he reports things have been good.  Trach changes have gone okay with the 2.5 mg of oxycodone, however, he still has discomfort.  He is wondering if this can be increased to 5 mg for trach changes as well as his G-tube changes.    He has been taking 600 mg gabapentin at bedtime due to increased pain in the morning.  This has helped, but he is noticing the pain starting to creep back in over the last month.  Wondering about a medication adjustment to help with this.    His mother had tested positive for Covid, however despite her having been around him, neither he nor anyone else in his house tested positive.      Overall he feels his mood has been good.  Continues to have his in-home staff doing physical therapy and range of motion exercises with him " daily.     Social History:  No changes. Continues to have 24/7 in the form of family and friends/PCAs.  Social History     Tobacco Use     Smoking status: Never Smoker     Smokeless tobacco: Never Used   Substance Use Topics     Alcohol use: Not on file     Drug use: Not on file     Family History- Reviewed in Epic.     Allergies   Allergen Reactions     Duloxetine Nausea and Vomiting     Pollen Extract      Medications- Reviewed in Epic.    Past Medical History- Reviewed in [facility].    Past Surgical History- Reviewed in Epic.    Review of Systems:   ROS: 10 point ROS neg other than the symptoms noted above in the HPI.    Physical Exam:   Constitutional: Alert young man sitting in a hospital bed in no acute distress.   ENT: Tracheostomy/ventilator.  Speech is slow and nearly normal through the trach.    Respiratory: Respirations unlabored, no cough, speaking full sentences.   MSK: Thin with advanced muscle loss.  Skin:  Skin is pale, no obvious lesions. G-tube site is unremarkable to visual inspection on his belly.   Neuro: Face is symmetric.    Psych: Affect is full, pleasant, interactive.        Key Data Reviewed: No new imaging or labs since last visit.    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     Based on his level of discomfort during trach and J-tube changes, I agree that giving him the ability to go up to 5 mg for each change makes sense.  Will increase prescription to allow for 5 mg dose 30 minutes prior to these exchanges.    We will trial adding 100 mg of gabapentin in the morning, and he can increase this to 200 mg after 5 days.  We will see if this helps his pain that is related to the morning stiffness.  If this does not provide enough benefit, we could look at increasing his bedtime dose further to see  if we get a more sustained effect.    Follow-up in 3 to 4 months for a medication check, sooner if needed.  We can make simple adjustments to the gabapentin over the phone.     Again, thank you for allowing me to participate in the care of your patient.  Sincerely,    Vicky Colvin, DO  Palliative Medicine   Pager [phone], [facility] ID [id number]

## 2020-10-20 NOTE — PATIENT INSTRUCTIONS
Recommendations:  -Okay to increase oxycodone to 5 mg prior to trach and J-tube changes.  -Start gabapentin 100 mg in the morning (a new prescription for the smaller size capsules was sent to your pharmacy).  You can increase this to 200mg  (2 capsules) after about 5 days if you think it is needed.  -If adding gabapentin in the morning is not as effective for your pain, we could look at increasing your bedtime dose to see if we get longer sustained effect overnight.    Follow up: 3-4 months, sooner if needed.  Please call with any questions or concerns and when you need refills.      Reasons to Call    If you are having worsening/uncontrolled symptoms we want you to call!    You or your other physicians make any changes to medications we have prescribed.    Important Phone Numbers    SHONNA Adames, RN palliative care nurse clinician [phone]    Kim Yancey. LPN, Palliative care coordinator [phone]    Appointment Line.[phone]   *After hours or on weekends. Will connect you with on call MD. [phone]

## 2020-10-20 NOTE — LETTER
"10/20/2020       RE: Ernesto Harding  [address]  [place] [address]     Dear Colleague,    Thank you for referring your patient, Ernesto Harding, to the [facility] CANCER CLINIC at [facility]. Please see a copy of my visit note below.    Ernesto Harding is a 30 year old male who is being evaluated via a billable video visit.      The patient has been notified of following:     \"This video visit will be conducted via a call between you and your physician/provider. We have found that certain health care needs can be provided without the need for an in-person physical exam.  This service lets us provide the care you need with a video conversation.  If a prescription is necessary we can send it directly to your pharmacy.  If lab work is needed we can place an order for that and you can then stop by our lab to have the test done at a later time.    Video visits are billed at different rates depending on your insurance coverage.  Please reach out to your insurance provider with any questions.    If during the course of the call the physician/provider feels a video visit is not appropriate, you will not be charged for this service.\"    Patient has given verbal consent for Video visit? Yes  How would you like to obtain your AVS? MyChart  If you are dropped from the video visit, the video invite should be resent to: Send to e-mail at: [email or web address]  Will anyone else be joining your video visit? No        TABITHA Rob      Palliative Care Progress Note    Patient Name: Ernesto Harding  Primary Provider: Arianne Weeks    Chief Complaint/Patient ID:   He has Duchenne's s/p trach/vent & JT     Referred/transferred to us from [place] [facility] due to advancing age.      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      Robaxin, gabapentin 600 hs. 2.5 mg oxycodone prn with trach changes     Has 24/7 nursing.    Last Palliative care appointment: " "7/24/20 with Dr. Hidalgo. Recommendations:   \"No changes recommended today.  Grateful occasional oxycodone helps.  I think it is okay to increase the gabapentin if he wants to-he is thinking about this.  Tolerating meds well.  No safety concerns.  Follow-up in 3 months\"     Reviewed: Yes, no concerns.    Interim History:  Ernesto Harding 30 year old male is seen today for follow up with Palliative Care via billable video visit.     Overall he reports things have been good.  Trach changes have gone okay with the 2.5 mg of oxycodone, however, he still has discomfort.  He is wondering if this can be increased to 5 mg for trach changes as well as his G-tube changes.    He has been taking 600 mg gabapentin at bedtime due to increased pain in the morning.  This has helped, but he is noticing the pain starting to creep back in over the last month.  Wondering about a medication adjustment to help with this.    His mother had tested positive for Covid, however despite her having been around him, neither he nor anyone else in his house tested positive.      Overall he feels his mood has been good.  Continues to have his in-home staff doing physical therapy and range of motion exercises with him daily.     Social History:  No changes. Continues to have 24/7 in the form of family and friends/PCAs.  Social History     Tobacco Use     Smoking status: Never Smoker     Smokeless tobacco: Never Used   Substance Use Topics     Alcohol use: Not on file     Drug use: Not on file     Family History- Reviewed in Epic.     Allergies   Allergen Reactions     Duloxetine Nausea and Vomiting     Pollen Extract      Medications- Reviewed in Epic.    Past Medical History- Reviewed in [facility].    Past Surgical History- Reviewed in Epic.    Review of Systems:   ROS: 10 point ROS neg other than the symptoms noted above in the HPI.      Physical Exam:   Constitutional: Alert young man sitting in a hospital bed in no acute distress.   ENT: " Tracheostomy/ventilator.  Speech is slow and nearly normal through the trach.    Respiratory: Respirations unlabored, no cough, speaking full sentences.   MSK: Thin with advanced muscle loss.  Skin:  Skin is pale, no obvious lesions. G-tube site is unremarkable to visual inspection on his belly.   Neuro: Face is symmetric.    Psych: Affect is full, pleasant, interactive.        Key Data Reviewed: No new imaging or labs since last visit.    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     Based on his level of discomfort during trach and J-tube changes, I agree that giving him the ability to go up to 5 mg for each change makes sense.  Will increase prescription to allow for 5 mg dose 30 minutes prior to these exchanges.    We will trial adding 100 mg of gabapentin in the morning, and he can increase this to 200 mg after 5 days.  We will see if this helps his pain that is related to the morning stiffness.  If this does not provide enough benefit, we could look at increasing his bedtime dose further to see if we get a more sustained effect.    Follow-up in 3 to 4 months for a medication check, sooner if needed.  We can make simple adjustments to the gabapentin over the phone.      Video-Visit Details  Video Start Time:  11:34 AM  Video End Time:  11:54 AM    Originating Location (pt. Location): Home     Distant Location (provider location): [facility]ER [facility]      Platform used for Video Visit: Joe Colvin DO  Palliative Medicine   Pager [phone], [facility] ID [id number]        Again, thank you for allowing me to participate in the care of your patient.      Sincerely,    Vicky Colvin DO

## 2020-11-24 ENCOUNTER — PATIENT OUTREACH (OUTPATIENT)
Dept: PALLIATIVE CARE | Facility: CLINIC | Age: 30
End: 2020-11-24

## 2020-11-24 DIAGNOSIS — G71.01 DMD (DUCHENNE MUSCULAR DYSTROPHY) (H): ICD-10-CM

## 2020-11-24 DIAGNOSIS — Z93.4 JEJUNOSTOMY TUBE PRESENT (H): ICD-10-CM

## 2020-11-24 DIAGNOSIS — Z93.0 TRACHEOSTOMY PRESENT (H): ICD-10-CM

## 2020-11-25 RX ORDER — OXYCODONE HCL 5 MG/5 ML
5 SOLUTION, ORAL ORAL PRN
Qty: 20 ML | Refills: 0 | Status: SHIPPED | OUTPATIENT
Start: 2020-11-25 | End: 2021-01-11

## 2020-11-25 NOTE — PROGRESS NOTES
Controlled Medication Requested:  Oxycodone  Last date dispensed/ sold  per  website: 10.20.20  Last office visit: 10.20.20  Next office visit:  01.27.2021      Recent Documentation from [place] prescription monitoring website:

## 2020-12-23 ENCOUNTER — PATIENT OUTREACH (OUTPATIENT)
Dept: PALLIATIVE CARE | Facility: CLINIC | Age: 30
End: 2020-12-23

## 2020-12-23 DIAGNOSIS — G71.01 DMD (DUCHENNE MUSCULAR DYSTROPHY) (H): ICD-10-CM

## 2020-12-23 DIAGNOSIS — M79.2 NEUROPATHIC PAIN: ICD-10-CM

## 2020-12-23 DIAGNOSIS — R07.81 RIB PAIN: ICD-10-CM

## 2020-12-23 DIAGNOSIS — M79.18 MUSCULOSKELETAL PAIN: ICD-10-CM

## 2020-12-23 RX ORDER — GABAPENTIN 100 MG/1
CAPSULE ORAL
Qty: 30 CAPSULE | Refills: 3 | Status: SHIPPED | OUTPATIENT
Start: 2020-12-23 | End: 2021-04-23

## 2020-12-23 RX ORDER — GABAPENTIN 300 MG/1
600 CAPSULE ORAL AT BEDTIME
Qty: 60 CAPSULE | Refills: 3 | Status: SHIPPED | OUTPATIENT
Start: 2020-12-23 | End: 2021-04-23

## 2020-12-23 NOTE — PROGRESS NOTES
Received VM from patient's home care nurse requesting refill of gabapentin (both strengths so that they are on file and she doesn't need to call again soon).     Last refill gabapentin 100mg: 10/20/2020  Last refill gabapentin 300m2020  Last office visit: 10/20/2020  Scheduled for follow up 2020    Will route request to MD for review.     [place]ed [place].

## 2021-01-11 ENCOUNTER — PATIENT OUTREACH (OUTPATIENT)
Dept: PALLIATIVE CARE | Facility: CLINIC | Age: 31
End: 2021-01-11

## 2021-01-11 DIAGNOSIS — Z93.4 JEJUNOSTOMY TUBE PRESENT (H): ICD-10-CM

## 2021-01-11 DIAGNOSIS — Z93.0 TRACHEOSTOMY PRESENT (H): ICD-10-CM

## 2021-01-11 DIAGNOSIS — G71.01 DMD (DUCHENNE MUSCULAR DYSTROPHY) (H): ICD-10-CM

## 2021-01-11 RX ORDER — OXYCODONE HCL 5 MG/5 ML
5 SOLUTION, ORAL ORAL PRN
Qty: 20 ML | Refills: 0 | Status: SHIPPED | OUTPATIENT
Start: 2021-01-11 | End: 2021-01-27

## 2021-01-11 NOTE — PROGRESS NOTES
Received VM from patient's home care nurse requesting refill of oxycdone.     Last refill: 11/25/2020  Last office visit: 10/20/2020  Scheduled for follow up 1/27/2021    Will route request to MD for review.     [place]ed [place].

## 2021-01-15 ENCOUNTER — HEALTH MAINTENANCE LETTER (OUTPATIENT)
Age: 31
End: 2021-01-15

## 2021-01-26 NOTE — PROGRESS NOTES
"Ernesto is a 30 year old who is being evaluated via a billable video visit.      How would you like to obtain your AVS? MyChart  If the video visit is dropped, the invitation should be resent by: Send to e-mail at: [email or web address]  Will anyone else be joining your video visit? No      Vitals - Patient Reported  Systolic (Patient Reported): (!) 1010  Diastolic (Patient Reported): 70  Weight (Patient Reported): 54.4 kg (120 lb)  Height (Patient Reported): 160 cm (5' 3\")  BMI (Based on Pt Reported Ht/Wt): 21.26  SpO2 (Patient Reported): 99  Temperature (Patient Reported): 98.3  F (36.8  C)  Pulse (Patient Reported): 71  Pain Score: No Pain (0)    I have reviewed and updated patient's allergy and medication list.    Concerns: NONE  Refills: NONE      Evelin Stallings CMA    Video Start Time: 11:55 AM  Video-Visit Details    Type of service:  Video Visit    Video End Time:12:08    Originating Location (pt. Location): Home    Distant Location (provider location):  [facility]ER [facility]     Platform used for Video Visit: [facility]    Palliative Care Progress Note    Patient Name: Ernesto Harding  Primary Provider: Arianne Weeks    Chief Complaint/Patient ID:   He has Duchenne's s/p trach/vent & JT     Referred/transferred to us from [place] [facility] care due to advancing age.      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      Robaxin, gabapentin 600 hs. 5 mg oxycodone prn with trach changes     Has 24/7 nursing.    Last Palliative care appointment: 10/20/20 w Dr. Colvin - increased PRN oxycodone dose with trach/tube changes to 2.5-5 mg 30 min prior, added AM gabapentin dose     Reviewed: Yes, consistent with history    Interim History:  Ernesto Harding 30 year old male is seen today for follow up with Palliative Care via billable video visit.     Overall reports things are good.  He felt the gabapentin 100 mg in the morning helped his pain some but he did notice feeling a " little more tired during the day, so he would not want to go up any further.  He is taking Robaxin about 3 times a day for muscle spasms.  The 5 mg oxycodone dose is doing better with his trach and J-tube changes.  They have noticed he is having some drainage around his G-tube site and are planning to likely get that changed in the next few weeks.    Continues to have his in-home staff doing physical therapy and range of motion exercises with him daily.  Appetite is good, and has gained a little weight.  No issues with bowels.  Mood is well.  He plans to get the coronavirus vaccine when available.     Social History:  No changes. Continues to have 24/7 in the form of family and friends/PCAs.  Social History     Tobacco Use     Smoking status: Never Smoker     Smokeless tobacco: Never Used   Substance Use Topics     Alcohol use: Not on file     Drug use: Not on file     Family History- Reviewed in Epic.     Allergies   Allergen Reactions     Duloxetine Nausea and Vomiting     Pollen Extract      Medications- Reviewed in Epic.    Past Medical History- Reviewed in [employer].    Past Surgical History- Reviewed in Epic.      Physical Exam:   Constitutional: Alert young man sitting in a hospital bed in no acute distress.   ENT: Tracheostomy/ventilator.  Speech is slow and nearly normal through the trach.    Respiratory: Respirations unlabored, no cough, speaking full sentences.   MSK: Thin with advanced muscle loss.  Skin:  Skin with no obvious lesions. G-tube site is unremarkable to visual inspection on his belly.   Neuro: Face is symmetric.    Psych: Affect is full, pleasant, interactive.        Key Data Reviewed: No new imaging or labs since last visit.    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     Pain - improved with  gabapentin 100 mg in the morning, balancing with sedating effect.   - Will continue gabapentin 100 mg qAM, 600 mg qPM  - Continue methocarbamol 500 mg 4 times daily as needed for muscle spasm  - Continue oxycodone 5 mg 30 minutes before trach and J-tube change  - Refills sent    Follow-up in 3 months.     Nerissa Arellano MD  Palliative Medicine  Pager [phone]

## 2021-01-27 ENCOUNTER — VIRTUAL VISIT (OUTPATIENT)
Dept: PALLIATIVE CARE | Facility: CLINIC | Age: 31
End: 2021-01-27
Attending: INTERNAL MEDICINE
Payer: COMMERCIAL

## 2021-01-27 DIAGNOSIS — Z93.4 JEJUNOSTOMY TUBE PRESENT (H): ICD-10-CM

## 2021-01-27 DIAGNOSIS — M62.838 MUSCLE SPASM: ICD-10-CM

## 2021-01-27 DIAGNOSIS — Z93.0 TRACHEOSTOMY PRESENT (H): ICD-10-CM

## 2021-01-27 DIAGNOSIS — G71.01 DMD (DUCHENNE MUSCULAR DYSTROPHY) (H): ICD-10-CM

## 2021-01-27 DIAGNOSIS — M79.18 MUSCULOSKELETAL PAIN: ICD-10-CM

## 2021-01-27 DIAGNOSIS — R07.81 RIB PAIN ON LEFT SIDE: ICD-10-CM

## 2021-01-27 PROCEDURE — 999N001193 HC VIDEO/TELEPHONE VISIT; NO CHARGE

## 2021-01-27 PROCEDURE — 99213 OFFICE O/P EST LOW 20 MIN: CPT | Mod: GT | Performed by: INTERNAL MEDICINE

## 2021-01-27 RX ORDER — OXYCODONE HCL 5 MG/5 ML
5 SOLUTION, ORAL ORAL PRN
Qty: 20 ML | Refills: 0 | Status: SHIPPED | OUTPATIENT
Start: 2021-02-08 | End: 2021-04-08

## 2021-01-27 RX ORDER — METHOCARBAMOL 500 MG/1
500 TABLET, FILM COATED ORAL 4 TIMES DAILY PRN
Qty: 120 TABLET | Refills: 5 | Status: SHIPPED | OUTPATIENT
Start: 2021-01-27 | End: 2021-10-13

## 2021-01-27 NOTE — LETTER
"1/27/2021        RE: Ernesto Harding  [address]  [facility] [place] [address]     Dear Colleague,    Thank you for referring your patient, Ernesto Harding, to the [facility] at [facility]. Please see a copy of my visit note below.    Ernesto is a 30 year old who is being evaluated via a billable video visit.      How would you like to obtain your AVS? MyChart  If the video visit is dropped, the invitation should be resent by: Send to e-mail at: [email or web address]  Will anyone else be joining your video visit? No      Vitals - Patient Reported  Systolic (Patient Reported): (!) 1010  Diastolic (Patient Reported): 70  Weight (Patient Reported): 54.4 kg (120 lb)  Height (Patient Reported): 160 cm (5' 3\")  BMI (Based on Pt Reported Ht/Wt): 21.26  SpO2 (Patient Reported): 99  Temperature (Patient Reported): 98.3  F (36.8  C)  Pulse (Patient Reported): 71  Pain Score: No Pain (0)    I have reviewed and updated patient's allergy and medication list.    Concerns: NONE  Refills: NONE      Evelin Stallings CMA    Video Start Time: 11:55 AM  Video-Visit Details    Type of service:  Video Visit    Video End Time:12:08    Originating Location (pt. Location): Home    Distant Location (provider location):  [facility] CANCER [facility]     Platform used for Video Visit: [facility]    Palliative Care Progress Note    Patient Name: Ernesto Harding  Primary Provider: Arianne Weeks    Chief Complaint/Patient ID:   He has Duchenne's s/p trach/vent & JT     Referred/transferred to us from [place] palliative care due to advancing age.      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      Robaxin, gabapentin 600 hs. 5 mg oxycodone prn with trach changes     Has 24/7 nursing.    Last Palliative care appointment: 10/20/20 w Dr. Colvin - increased PRN oxycodone dose with trach/tube changes to 2.5-5 mg 30 min prior, added AM gabapentin dose     " Reviewed: Yes, consistent with history    Interim History:  Ernesto Harding 30 year old male is seen today for follow up with Palliative Care via billable video visit.     Overall reports things are good.  He felt the gabapentin 100 mg in the morning helped his pain some but he did notice feeling a little more tired during the day, so he would not want to go up any further.  He is taking Robaxin about 3 times a day for muscle spasms.  The 5 mg oxycodone dose is doing better with his trach and J-tube changes.  They have noticed he is having some drainage around his G-tube site and are planning to likely get that changed in the next few weeks.    Continues to have his in-home staff doing physical therapy and range of motion exercises with him daily.  Appetite is good, and has gained a little weight.  No issues with bowels.  Mood is well.  He plans to get the coronavirus vaccine when available.     Social History:  No changes. Continues to have 24/7 in the form of family and friends/PCAs.  Social History     Tobacco Use     Smoking status: Never Smoker     Smokeless tobacco: Never Used   Substance Use Topics     Alcohol use: Not on file     Drug use: Not on file     Family History- Reviewed in Epic.     Allergies   Allergen Reactions     Duloxetine Nausea and Vomiting     Pollen Extract      Medications- Reviewed in Epic.    Past Medical History- Reviewed in [facility].    Past Surgical History- Reviewed in Epic.      Physical Exam:   Constitutional: Alert young man sitting in a hospital bed in no acute distress.   ENT: Tracheostomy/ventilator.  Speech is slow and nearly normal through the trach.    Respiratory: Respirations unlabored, no cough, speaking full sentences.   MSK: Thin with advanced muscle loss.  Skin:  Skin with no obvious lesions. G-tube site is unremarkable to visual inspection on his belly.   Neuro: Face is symmetric.    Psych: Affect is full, pleasant, interactive.        Key Data Reviewed: No new imaging or  labs since last visit.    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     Pain - improved with gabapentin 100 mg in the morning, balancing with sedating effect.   - Will continue gabapentin 100 mg qAM, 600 mg qPM  - Continue methocarbamol 500 mg 4 times daily as needed for muscle spasm  - Continue oxycodone 5 mg 30 minutes before trach and J-tube change  - Refills sent    Follow-up in 3 months.     Nerissa Arellano MD  Palliative Medicine  Pager [phone]

## 2021-04-08 DIAGNOSIS — Z93.4 JEJUNOSTOMY TUBE PRESENT (H): ICD-10-CM

## 2021-04-08 DIAGNOSIS — G71.01 DMD (DUCHENNE MUSCULAR DYSTROPHY) (H): ICD-10-CM

## 2021-04-08 DIAGNOSIS — Z93.0 TRACHEOSTOMY PRESENT (H): ICD-10-CM

## 2021-04-08 RX ORDER — OXYCODONE HCL 5 MG/5 ML
5 SOLUTION, ORAL ORAL PRN
Qty: 20 ML | Refills: 0 | Status: SHIPPED | OUTPATIENT
Start: 2021-04-08 | End: 2021-05-25

## 2021-04-08 NOTE — TELEPHONE ENCOUNTER
Received VM from patient's nurse requesting refill of oxycodone.     Last refill: 1/27/21  Last office visit: 1/27/21  Scheduled for follow up 4/27/21     Will route request to MD for review.     Reviewed [place]  Report.

## 2021-04-23 ENCOUNTER — PATIENT OUTREACH (OUTPATIENT)
Dept: PALLIATIVE CARE | Facility: CLINIC | Age: 31
End: 2021-04-23

## 2021-04-23 DIAGNOSIS — R07.81 RIB PAIN: ICD-10-CM

## 2021-04-23 DIAGNOSIS — M79.18 MUSCULOSKELETAL PAIN: ICD-10-CM

## 2021-04-23 DIAGNOSIS — M79.2 NEUROPATHIC PAIN: ICD-10-CM

## 2021-04-23 DIAGNOSIS — G71.01 DMD (DUCHENNE MUSCULAR DYSTROPHY) (H): ICD-10-CM

## 2021-04-23 RX ORDER — GABAPENTIN 100 MG/1
CAPSULE ORAL
Qty: 30 CAPSULE | Refills: 3 | Status: SHIPPED | OUTPATIENT
Start: 2021-04-23 | End: 2021-07-30

## 2021-04-23 RX ORDER — GABAPENTIN 300 MG/1
600 CAPSULE ORAL AT BEDTIME
Qty: 60 CAPSULE | Refills: 3 | Status: SHIPPED | OUTPATIENT
Start: 2021-04-23 | End: 2021-07-30

## 2021-04-23 NOTE — PROGRESS NOTES
Received VM from patient's home care nurse requesting refills of gabapentin.     Last refills: 12/23/2021  Last office visit: 1/27/2021  Scheduled for follow up 4/27/2021    Will route request to MD for review.     Reviewed [place]  Report.

## 2021-04-27 ENCOUNTER — VIRTUAL VISIT (OUTPATIENT)
Dept: PALLIATIVE CARE | Facility: CLINIC | Age: 31
End: 2021-04-27
Attending: STUDENT IN AN ORGANIZED HEALTH CARE EDUCATION/TRAINING PROGRAM
Payer: COMMERCIAL

## 2021-04-27 DIAGNOSIS — Z51.5 ENCOUNTER FOR PALLIATIVE CARE: ICD-10-CM

## 2021-04-27 DIAGNOSIS — Z79.891 ENCOUNTER FOR LONG-TERM USE OF OPIATE ANALGESIC: ICD-10-CM

## 2021-04-27 DIAGNOSIS — Z93.4 JEJUNOSTOMY TUBE PRESENT (H): ICD-10-CM

## 2021-04-27 DIAGNOSIS — G71.01 DMD (DUCHENNE MUSCULAR DYSTROPHY) (H): Primary | ICD-10-CM

## 2021-04-27 DIAGNOSIS — M79.2 NEUROPATHIC PAIN: ICD-10-CM

## 2021-04-27 DIAGNOSIS — M79.18 MUSCULOSKELETAL PAIN: ICD-10-CM

## 2021-04-27 DIAGNOSIS — M62.838 MUSCLE SPASM: ICD-10-CM

## 2021-04-27 DIAGNOSIS — Z93.0 TRACHEOSTOMY PRESENT (H): ICD-10-CM

## 2021-04-27 PROCEDURE — 99214 OFFICE O/P EST MOD 30 MIN: CPT | Mod: GT | Performed by: STUDENT IN AN ORGANIZED HEALTH CARE EDUCATION/TRAINING PROGRAM

## 2021-04-27 PROCEDURE — 999N001193 HC VIDEO/TELEPHONE VISIT; NO CHARGE

## 2021-04-27 NOTE — LETTER
"4/27/2021       RE: Ernesto Harding  [address]  [facility] [address]     Dear Colleague,    Thank you for referring your patient, [facility] Hard[facility] . Please see a copy of my visit note below.    Ernesto is a 30 year old who is being evaluated via a billable video visit.      How would you like to obtain your AVS? MyChart  If the video visit is dropped, the invitation should be resent by: Send to e-mail at: [email or web address]  Will anyone else be joining your video visit? Yes: Nurse- Tanja . How would they like to receive their invitation? Send to e-mail at: [email or web address]       Vitals - Patient Reported  Weight (Patient Reported): 56.7 kg (125 lb)  Height (Patient Reported): 160 cm (5' 3\")  BMI (Based on Pt Reported Ht/Wt): 22.14  Pain Score: No Pain (0)    Radha Blakely CMA on 4/27/2021 at 3:35 PM      Palliative Care Progress Note    Patient Name: Ernesto Harding  Primary Provider: Arianne Weeks    Chief Complaint/Patient ID:   He has Duchenne's s/p trach/vent & JT     Referred/transferred to us from [place] palliative care due to advancing age.      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      Robaxin, gabapentin 600 hs and 100mg in AM. 5 mg oxycodone prn with trach changes     Has 24/7 nursing.    Last Palliative care appointment: 1/27/21 with Dr. Arellano. Stable regimen, no changes.     Reviewed: Yes, no concerns.    Interim History:  Ernesto Harding 30 year old male is seen today for follow up with Palliative Care via billable video visit.      Overall feels very comfortable. Has adjusted to the 100mg Gabapentin in AM and no longer feels sedated from it. Pain is well controlled. Using 5mg oxycodone with trach changes with good result. Sleeping well. Feels his feeds have been going well- is getting weighed on 4/29 as it has been some time since his last official weight. " No other concerns he wanted to discuss today.     Social History:  No changes  Social History     Tobacco Use     Smoking status: Never Smoker     Smokeless tobacco: Never Used   Substance Use Topics     Alcohol use: Not on file     Drug use: Not on file       Family History- Reviewed in Epic.    Allergies   Allergen Reactions     Duloxetine Nausea and Vomiting     Pollen Extract      Medications- Reviewed in Epic.    Past Medical History- Reviewed in [facility].    Past Surgical History- Reviewed in Epic.    Review of Systems:   ROS: 10 point ROS neg other than the symptoms noted above in the HPI.      Physical Exam:   Constitutional: Alert young man sitting in a hospital bed in no acute distress.   ENT: Tracheostomy/ventilator.  Speech is slow and nearly normal through the trach.    Respiratory: Respirations unlabored, no cough, speaking full sentences.   MSK: Thin with advanced muscle loss.  Skin:  Skin with no obvious lesions. G-tube site is unremarkable to visual inspection on his belly.   Neuro: Face is symmetric.    Psych: Affect is full, pleasant, interactive.          Key Data Reviewed:  No new imaging or labs since last visit.    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     Pain, MSK and Neuropathic  Muscle spasms   Improved with gabapentin 100 mg in the morning, balancing with sedating effect.   - Will continue gabapentin 100 mg qAM, 600 mg qPM  - Continue methocarbamol 5020 mg 4 times daily as needed for muscle spasm  - Continue oxycodone 5 mg 30 minutes before trach and J-tube change  - Discussed that if he were to have increased pain during the day, we could add an additional 100mg dose gabapentin.      Follow up in 3 months, sooner if questions.    Video-Visit Details  Video Start Time: 3:41PM  Video End  Time: 3:48PM    Originating Location (pt. Location): Home     Distant Location (provider location):  [facility] CANCER [facility]     Platform used for Video Visit: Joe     Total time spent on day of encounter is 21 mins, including reviewing record, review of above studies, above visit with patient, and documentation.     Vicky Colvin, DO  Palliative Medicine   Pager [phone], AMCOM

## 2021-04-27 NOTE — PATIENT INSTRUCTIONS
Recommendations:  - Continue medications the same.  - If in the future you are having more daytime pain, we could add another 100mg dose of gabapentin midday or make adjustments to your other doses.    Follow up: 3 months, call sooner with questions or concerns      Reasons to Call    If you are having worsening/uncontrolled symptoms we want you to call!    You or your other physicians make any changes to medications we have prescribed.    Important Phone Numbers    Kat Laboy. Palliative Care RN. Answered 8 am to 4 pm . [phone]    Shyanne Lamas. [facility] palliative care nurse clinician [phone]    Appointment Line.[phone]   *After hours or on weekends. Will connect you with on call MD. [phone]

## 2021-04-27 NOTE — PROGRESS NOTES
"Ernesto is a 30 year old who is being evaluated via a billable video visit.      How would you like to obtain your AVS? MyChart  If the video visit is dropped, the invitation should be resent by: Send to e-mail at: [email or web address]  Will anyone else be joining your video visit? Yes: Nurse- Tanja . How would they like to receive their invitation? Send to e-mail at: [email or web address]       Vitals - Patient Reported  Weight (Patient Reported): 56.7 kg (125 lb)  Height (Patient Reported): 160 cm (5' 3\")  BMI (Based on Pt Reported Ht/Wt): 22.14  Pain Score: No Pain (0)      Radha Blakely CMA on 4/27/2021 at 3:35 PM      Palliative Care Progress Note    Patient Name: Ernesto Harding  Primary Provider: Arianne Weeks    Chief Complaint/Patient ID:   He has Duchenne's s/p trach/vent & JT     Referred/transferred to us from [place] palliative care due to advancing age.      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      Robaxin, gabapentin 600 hs and 100mg in AM. 5 mg oxycodone prn with trach changes     Has 24/7 nursing.    Last Palliative care appointment: 1/27/21 with Dr. Arellano. Stable regimen, no changes.     Reviewed: Yes, no concerns.    Interim History:  Ernesto Harding 30 year old male is seen today for follow up with Palliative Care via billable video visit.      Overall feels very comfortable. Has adjusted to the 100mg Gabapentin in AM and no longer feels sedated from it. Pain is well controlled. Using 5mg oxycodone with trach changes with good result. Sleeping well. Feels his feeds have been going well- is getting weighed on 4/29 as it has been some time since his last official weight. No other concerns he wanted to discuss today.     Social History:  No changes  Social History     Tobacco Use     Smoking status: Never Smoker     Smokeless tobacco: Never Used   Substance Use Topics     Alcohol use: Not on file     Drug use: Not on file       Family History- Reviewed in " Epic.    Allergies   Allergen Reactions     Duloxetine Nausea and Vomiting     Pollen Extract      Medications- Reviewed in Epic.    Past Medical History- Reviewed in [facility].    Past Surgical History- Reviewed in Epic.    Review of Systems:   ROS: 10 point ROS neg other than the symptoms noted above in the HPI.      Physical Exam:   Constitutional: Alert young man sitting in a hospital bed in no acute distress.   ENT: Tracheostomy/ventilator.  Speech is slow and nearly normal through the trach.    Respiratory: Respirations unlabored, no cough, speaking full sentences.   MSK: Thin with advanced muscle loss.  Skin:  Skin with no obvious lesions. G-tube site is unremarkable to visual inspection on his belly.   Neuro: Face is symmetric.    Psych: Affect is full, pleasant, interactive.          Key Data Reviewed:  No new imaging or labs since last visit.    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     Pain, MSK and Neuropathic  Muscle spasms   Improved with gabapentin 100 mg in the morning, balancing with sedating effect.   - Will continue gabapentin 100 mg qAM, 600 mg qPM  - Continue methocarbamol 5020 mg 4 times daily as needed for muscle spasm  - Continue oxycodone 5 mg 30 minutes before trach and J-tube change  - Discussed that if he were to have increased pain during the day, we could add an additional 100mg dose gabapentin.      Follow up in 3 months, sooner if questions.    Video-Visit Details  Video Start Time: 3:41PM  Video End Time: 3:48PM    Originating Location (pt. Location): Home     Distant Location (provider location):  [facility]     Platform used for Video Visit: Joe     Total time spent on day of encounter is 21 mins, including reviewing record, review of above studies, above visit  with patient, and documentation.     Vicky Colvin, DO  Palliative Medicine   Pager [phone], AMCOM ID [id number]

## 2021-05-09 ENCOUNTER — HEALTH MAINTENANCE LETTER (OUTPATIENT)
Age: 31
End: 2021-05-09

## 2021-05-25 ENCOUNTER — PATIENT OUTREACH (OUTPATIENT)
Dept: PALLIATIVE CARE | Facility: CLINIC | Age: 31
End: 2021-05-25

## 2021-05-25 DIAGNOSIS — Z93.4 JEJUNOSTOMY TUBE PRESENT (H): ICD-10-CM

## 2021-05-25 DIAGNOSIS — G71.01 DMD (DUCHENNE MUSCULAR DYSTROPHY) (H): ICD-10-CM

## 2021-05-25 DIAGNOSIS — Z93.0 TRACHEOSTOMY PRESENT (H): ICD-10-CM

## 2021-05-25 RX ORDER — OXYCODONE HCL 5 MG/5 ML
5 SOLUTION, ORAL ORAL PRN
Qty: 20 ML | Refills: 0 | Status: SHIPPED | OUTPATIENT
Start: 2021-05-25 | End: 2021-07-20

## 2021-05-25 NOTE — PROGRESS NOTES
Received VM from patient's home care nurse requesting refill of oxycodone.     Last refill: 4/8/2021  Last office visit: 4/27/2021  Scheduled for follow up 7/27/2021    Will route request to MD for review.     Reviewed [place]  Report.

## 2021-07-20 ENCOUNTER — PATIENT OUTREACH (OUTPATIENT)
Dept: PALLIATIVE CARE | Facility: CLINIC | Age: 31
End: 2021-07-20

## 2021-07-20 DIAGNOSIS — G71.01 DMD (DUCHENNE MUSCULAR DYSTROPHY) (H): ICD-10-CM

## 2021-07-20 DIAGNOSIS — Z93.4 JEJUNOSTOMY TUBE PRESENT (H): ICD-10-CM

## 2021-07-20 DIAGNOSIS — Z93.0 TRACHEOSTOMY PRESENT (H): ICD-10-CM

## 2021-07-20 RX ORDER — OXYCODONE HCL 5 MG/5 ML
5 SOLUTION, ORAL ORAL SEE ADMIN INSTRUCTIONS
Qty: 20 ML | Refills: 0 | Status: SHIPPED | OUTPATIENT
Start: 2021-07-20 | End: 2021-09-10

## 2021-07-20 NOTE — PROGRESS NOTES
Received VM from patient's home care nurse requesting refill of oxycodone.     Last refill: 5/25/2021  Last office visit: 4/27/2021  Scheduled for follow up 7/27/2021     Will route request to MD for review.     Reviewed [place]  Report.

## 2021-07-29 NOTE — PROGRESS NOTES
Ernesto is a 31 year old who is being evaluated via a billable video visit.      How would you like to obtain your AVS? MyChart  If the video visit is dropped, the invitation should be resent by: Text to cell phone: [phone]  Will anyone else be joining your video visit? No       Hiral Urbano, CMA on 7/30/2021 at 8:55 AM      Palliative Care Progress Note    Patient Name: Ernesto Harding  Primary Provider: Arianne Weeks    Chief Complaint/Patient ID: 30 yo M with Duchenne's s/p trach/vent & JT     Referred/transferred to us from [place] palliative care due to advancing age.      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      Robaxin, gabapentin 600 hs and 100mg in AM. 5 mg oxycodone prn with trach changes     Has 24/7 nursing.     Last Palliative care appointment: 4/27/21 with me. Stable regimen, no changes.     Reviewed: Yes, no concerns.    Interim History:  Ernesto Harding 31 year old male is seen today for follow up with Palliative Care via billable video visit.      Overall things are going very well.  He turned 31 in June which was exciting.  Has been able to return to going in person for physical therapy once a week, and he sees the same group he is seen since he was 7 years old.    Medications are going well.  Still taking gabapentin 600 mg at bedtime and 100 mg in the morning.  Does not feel the need to add more during the day.  Using oxycodone prior to trach and J-tube changes.     Social History:  No changes.  Has in-home nursing care.  Social History     Tobacco Use     Smoking status: Never Smoker     Smokeless tobacco: Never Used   Substance Use Topics     Alcohol use: Not on file     Drug use: Not on file     Allergies   Allergen Reactions     Duloxetine Nausea and Vomiting     Pollen Extract        Advanced Care Planning: Has completed HCD.  His mother is his HCA.  They will have the nursing company fax this to us.    Medications- Reviewed in Epic.    Past Medical History-  Reviewed in [facility].    Past Surgical History- Reviewed in Epic.    Review of Systems:   ROS: 10 point ROS neg other than the symptoms noted above in the HPI.      Physical Exam:   Constitutional: Alert young man sitting in a hospital bed in no acute distress.   ENT: Tracheostomy/ventilator.  Speech is slow and nearly normal through the trach.    Respiratory: Respirations unlabored, no cough, speaking full sentences.   MSK: Thin with advanced muscle loss.  Skin:  Skin with no obvious lesions. G-tube site is unremarkable to visual inspection on his belly.   Neuro: Face is symmetric.    Psych: Affect is full, pleasant, interactive.        Key Data Reviewed:  No new labs.   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    Pain, MSK and Neuropathic  Muscle spasms - Stable on current regimen, no need for changes at this time.  - Will continue gabapentin 100 mg qAM, 600 mg qPM  - Continue methocarbamol 500 mg 4 times daily as needed for muscle spasm  - Continue oxycodone 5 mg 30 minutes before trach and J-tube change  - Discussed that if he were to have increased pain during the day, we could add an additional 100mg dose gabapentin.    ACP - Has completed HCD, however we do not have this on file.  His mother is his HCA.  I will fax this to the [facility] so it can be scanned into his chart.    Follow up in 3 months, sooner if questions.    Video-Visit Details  Video Start Time:  9:20 AM  Video End Time:  9:27 AM    Originating Location (pt. Location): Home     Distant Location (provider location): [facility]     Platform used for Video Visit: [employer]     Total time spent on day of encounter is 15 mins, including reviewing record, review of above studies, above visit with patient, and  documentation.     Vicky Colvin,   Palliative Medicine   Pager [phone], AMCOM ID [id number]    Some chart documentation performed using Dragon Voice recognition Software. Although reviewed after completion, some words and grammatical errors may remain.

## 2021-07-30 ENCOUNTER — VIRTUAL VISIT (OUTPATIENT)
Dept: ONCOLOGY | Facility: CLINIC | Age: 31
End: 2021-07-30
Attending: STUDENT IN AN ORGANIZED HEALTH CARE EDUCATION/TRAINING PROGRAM
Payer: COMMERCIAL

## 2021-07-30 DIAGNOSIS — Z79.891 ENCOUNTER FOR LONG-TERM USE OF OPIATE ANALGESIC: ICD-10-CM

## 2021-07-30 DIAGNOSIS — G71.01 DMD (DUCHENNE MUSCULAR DYSTROPHY) (H): Primary | ICD-10-CM

## 2021-07-30 DIAGNOSIS — M79.2 NEUROPATHIC PAIN: ICD-10-CM

## 2021-07-30 DIAGNOSIS — Z71.89 ADVANCED CARE PLANNING/COUNSELING DISCUSSION: ICD-10-CM

## 2021-07-30 DIAGNOSIS — M79.18 MUSCULOSKELETAL PAIN: ICD-10-CM

## 2021-07-30 DIAGNOSIS — Z51.5 ENCOUNTER FOR PALLIATIVE CARE: ICD-10-CM

## 2021-07-30 DIAGNOSIS — R07.81 RIB PAIN: ICD-10-CM

## 2021-07-30 PROCEDURE — 99213 OFFICE O/P EST LOW 20 MIN: CPT | Mod: 95 | Performed by: STUDENT IN AN ORGANIZED HEALTH CARE EDUCATION/TRAINING PROGRAM

## 2021-07-30 RX ORDER — GABAPENTIN 100 MG/1
CAPSULE ORAL
Qty: 30 CAPSULE | Refills: 3 | Status: SHIPPED | OUTPATIENT
Start: 2021-07-30 | End: 2021-12-15

## 2021-07-30 RX ORDER — GABAPENTIN 300 MG/1
600 CAPSULE ORAL AT BEDTIME
Qty: 60 CAPSULE | Refills: 3 | Status: SHIPPED | OUTPATIENT
Start: 2021-07-30 | End: 2021-12-15

## 2021-07-30 NOTE — PATIENT INSTRUCTIONS
Recommendations:  - Continue medications the same- no changes.  - Please fax your healthcare directive to [phone] so it can be scanned into your medical record.    Follow up: 3 months      Reasons to Call    If you are having worsening/uncontrolled symptoms we want you to call!    You or your other physicians make any changes to medications we have prescribed.    Important Phone Numbers    Kat Laboy. Palliative Care RN. Answered 8 am to 4 pm . [phone]    Shyanne Lamas. [facility] palliative care nurse clinician [phone]    Appointment Line.[phone]   *After hours or on weekends. Will connect you with on call MD. [phone]

## 2021-07-30 NOTE — LETTER
7/30/2021         RE: Ernesto Harding  [address]  [facility] [place] [address]        Dear Colleague,    Thank you for referring your patient, Ernesto Harding, to the [facility]. Please see a copy of my visit note below.    Ernesto is a 31 year old who is being evaluated via a billable video visit.      How would you like to obtain your AVS? MyChart  If the video visit is dropped, the invitation should be resent by: Text to cell phone: [phone]  Will anyone else be joining your video visit? No       Hiral Urbano, CRISTIAN on 7/30/2021 at 8:55 AM      Palliative Care Progress Note    Patient Name: Ernesto Harding  Primary Provider: Arianne Weeks    Chief Complaint/Patient ID: 32 yo M with Duchenne's s/p trach/vent & JT     Referred/transferred to us from [place] palliative care due to advancing age.      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      Robaxin, gabapentin 600 hs and 100mg in AM. 5 mg oxycodone prn with trach changes     Has 24/7 nursing.     Last Palliative care appointment: 4/27/21 with me. Stable regimen, no changes.     Reviewed: Yes, no concerns.    Interim History:  rEnesto Harding 31 year old male is seen today for follow up with Palliative Care via billable video visit.      Overall things are going very well.  He turned 31 in June which was exciting.  Has been able to return to going in person for physical therapy once a week, and he sees the same group he is seen since he was 7 years old.    Medications are going well.  Still taking gabapentin 600 mg at bedtime and 100 mg in the morning.  Does not feel the need to add more during the day.  Using oxycodone prior to trach and J-tube changes.     Social History:  No changes.  Has in-home nursing care.  Social History     Tobacco Use     Smoking status: Never Smoker     Smokeless tobacco: Never Used   Substance Use Topics     Alcohol use: Not on file     Drug use: Not on file     Allergies   Allergen  Reactions     Duloxetine Nausea and Vomiting     Pollen Extract        Advanced Care Planning: Has completed HCD.  His mother is his HCA.  They will have the nursing company fax this to us.    Medications- Reviewed in Epic.    Past Medical History- Reviewed in [facility].    Past Surgical History- Reviewed in Epic.    Review of Systems:   ROS: 10 point ROS neg other than the symptoms noted above in the HPI.      Physical Exam:   Constitutional: Alert young man sitting in a hospital bed in no acute distress.   ENT: Tracheostomy/ventilator.  Speech is slow and nearly normal through the trach.    Respiratory: Respirations unlabored, no cough, speaking full sentences.   MSK: Thin with advanced muscle loss.  Skin:  Skin with no obvious lesions. G-tube site is unremarkable to visual inspection on his belly.   Neuro: Face is symmetric.    Psych: Affect is full, pleasant, interactive.        Key Data Reviewed:  No new labs.   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    Pain, MSK and Neuropathic  Muscle spasms - Stable on current regimen, no need for changes at this time.  - Will continue gabapentin 100 mg qAM, 600 mg qPM  - Continue methocarbamol 500 mg 4 times daily as needed for muscle spasm  - Continue oxycodone 5 mg 30 minutes before trach and J-tube change  - Discussed that if he were to have increased pain during the day, we could add an additional 100mg dose gabapentin.    ACP - Has completed HCD, however we do not have this on file.  His mother is his HCA.  I will fax this to the [facility] so it can be scanned into his chart.    Follow up in 3 months, sooner if questions.    Video-Visit Details  Video Start Time:  9:20 AM  Video End Time:  9:27 AM    Originating Location (pt. Location): Home      Distant Location (provider location): [facility]     Platform used for Video Visit: Joe     Total time spent on day of encounter is 15 mins, including reviewing record, review of above studies, above visit with patient, and documentation.     Vicky Colvin DO  Palliative Medicine   Pager [phone], [facility] ID [id number]    Some chart documentation performed using Dragon Voice recognition Software. Although reviewed after completion, some words and grammatical errors may remain.        Again, thank you for allowing me to participate in the care of your patient.        Sincerely,        Vicky Colvin DO

## 2021-07-30 NOTE — LETTER
7/30/2021         RE: Ernesto Harding  [address]  [facility] [place] [address]        Dear Colleague,    Thank you for referring your patient, Ernesto Harding, to the [facility]. Please see a copy of my visit note below.    rEnesto is a 31 year old who is being evaluated via a billable video visit.      How would you like to obtain your AVS? MyChart  If the video visit is dropped, the invitation should be resent by: Text to cell phone: [phone]  Will anyone else be joining your video visit? 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      Robaxin, gabapentin 600 hs and 100mg in AM. 5 mg oxycodone prn with trach changes     Has 24/7 nursing.     Last Palliative care appointment: 4/27/21 with me. Stable regimen, no changes.     Reviewed: Yes, no concerns.    Interim History:  Ernesto Harding 31 year old male is seen today for follow up with Palliative Care via billable video visit.      Overall things are going very well.  He turned 31 in June which was exciting.  Has been able to return to going in person for physical therapy once a week, and he sees the same group he is seen since he was 7 years old.    Medications are going well.  Still taking gabapentin 600 mg at bedtime and 100 mg in the morning.  Does not feel the need to add more during the day.  Using oxycodone prior to trach and J-tube changes.     Social History:  No changes.  Has in-home nursing care.  Social History     Tobacco Use     Smoking status: Never Smoker     Smokeless tobacco: Never Used   Substance Use Topics     Alcohol use: Not on file     Drug use: Not on file     Allergies   Allergen  Reactions     Duloxetine Nausea and Vomiting     Pollen Extract        Advanced Care Planning: Has completed HCD.  His mother is his HCA.  They will have the nursing company fax this to us.    Medications- Reviewed in Epic.    Past Medical History- Reviewed in [facility].    Past Surgical History- Reviewed in Epic.    Review of Systems:   ROS: 10 point ROS neg other than the symptoms noted above in the HPI.      Physical Exam:   Constitutional: Alert young man sitting in a hospital bed in no acute distress.   ENT: Tracheostomy/ventilator.  Speech is slow and nearly normal through the trach.    Respiratory: Respirations unlabored, no cough, speaking full sentences.   MSK: Thin with advanced muscle loss.  Skin:  Skin with no obvious lesions. G-tube site is unremarkable to visual inspection on his belly.   Neuro: Face is symmetric.    Psych: Affect is full, pleasant, interactive.        Key Data Reviewed:  No new labs.   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    Pain, MSK and Neuropathic  Muscle spasms - Stable on current regimen, no need for changes at this time.  - Will continue gabapentin 100 mg qAM, 600 mg qPM  - Continue methocarbamol 500 mg 4 times daily as needed for muscle spasm  - Continue oxycodone 5 mg 30 minutes before trach and J-tube change  - Discussed that if he were to have increased pain during the day, we could add an additional 100mg dose gabapentin.    ACP - Has completed HCD, however we do not have this on file.  His mother is his HCA.  I will fax this to the [facility] so it can be scanned into his chart.    Follow up in 3 months, sooner if questions.    Video-Visit Details  Video Start Time:  9:20 AM  Video End Time:  9:27 AM    Originating Location (pt. Location): Home      Distant Location (provider location): [facility]     Platform used for Video Visit: Joe     Total time spent on day of encounter is 15 mins, including reviewing record, review of above studies, above visit with patient, and documentation.     Vicky Colvin DO  Palliative Medicine   Pager [phone], [facility] ID [id number]    Some chart documentation performed using Dragon Voice recognition Software. Although reviewed after completion, some words and grammatical errors may remain.        Again, thank you for allowing me to participate in the care of your patient.        Sincerely,        Vicky Colvin DO

## 2021-08-02 ENCOUNTER — DOCUMENTATION ONLY (OUTPATIENT)
Dept: OTHER | Facility: CLINIC | Age: 31
End: 2021-08-02

## 2021-09-10 ENCOUNTER — PATIENT OUTREACH (OUTPATIENT)
Dept: PALLIATIVE CARE | Facility: CLINIC | Age: 31
End: 2021-09-10

## 2021-09-10 DIAGNOSIS — Z93.4 JEJUNOSTOMY TUBE PRESENT (H): ICD-10-CM

## 2021-09-10 DIAGNOSIS — Z93.0 TRACHEOSTOMY PRESENT (H): ICD-10-CM

## 2021-09-10 DIAGNOSIS — G71.01 DMD (DUCHENNE MUSCULAR DYSTROPHY) (H): ICD-10-CM

## 2021-09-10 RX ORDER — OXYCODONE HCL 5 MG/5 ML
5 SOLUTION, ORAL ORAL SEE ADMIN INSTRUCTIONS
Qty: 20 ML | Refills: 0 | Status: SHIPPED | OUTPATIENT
Start: 2021-09-14 | End: 2021-10-12

## 2021-09-10 NOTE — PROGRESS NOTES
Received VM from patient's home care nurse requesting refill of oxycodone.     Last refill: 7/20/2021  Last office visit: 7/30/2021  Scheduled for follow up 10/27/2021    Will route request to MD for review.     Reviewed [place]  Report.

## 2021-10-12 ENCOUNTER — PATIENT OUTREACH (OUTPATIENT)
Dept: PALLIATIVE CARE | Facility: CLINIC | Age: 31
End: 2021-10-12

## 2021-10-12 DIAGNOSIS — G71.01 DMD (DUCHENNE MUSCULAR DYSTROPHY) (H): ICD-10-CM

## 2021-10-12 DIAGNOSIS — Z93.0 TRACHEOSTOMY PRESENT (H): ICD-10-CM

## 2021-10-12 DIAGNOSIS — Z93.4 JEJUNOSTOMY TUBE PRESENT (H): ICD-10-CM

## 2021-10-12 RX ORDER — OXYCODONE HCL 5 MG/5 ML
5 SOLUTION, ORAL ORAL SEE ADMIN INSTRUCTIONS
Qty: 20 ML | Refills: 0 | Status: SHIPPED | OUTPATIENT
Start: 2021-10-12 | End: 2021-12-03

## 2021-10-12 NOTE — PROGRESS NOTES
Received VM from patient's home care nurse Tanja asking for refill of oxycodone.     Spoke with Tanja - usually oxycodone script lasts 2 months (4 total doses, 2 trach changes per month).     In addition to trach changes, patient needs JTube changes 2-3 times per year and uses oxycodone before these as well. Has JTube change today so has only 1 dose of oxycodone left so asking for refill now.     Last refill: 9/14/2021  Last office visit: 4/27/2021  Scheduled for follow up 10/27/2021     Will route request to MD for review.     Reviewed [place]  Report.

## 2021-10-13 ENCOUNTER — PATIENT OUTREACH (OUTPATIENT)
Dept: PALLIATIVE CARE | Facility: CLINIC | Age: 31
End: 2021-10-13

## 2021-10-13 DIAGNOSIS — R07.81 RIB PAIN ON LEFT SIDE: ICD-10-CM

## 2021-10-13 DIAGNOSIS — M62.838 MUSCLE SPASM: ICD-10-CM

## 2021-10-13 DIAGNOSIS — M79.18 MUSCULOSKELETAL PAIN: ICD-10-CM

## 2021-10-13 RX ORDER — METHOCARBAMOL 500 MG/1
500 TABLET, FILM COATED ORAL 4 TIMES DAILY PRN
Qty: 120 TABLET | Refills: 6 | Status: SHIPPED | OUTPATIENT
Start: 2021-10-13 | End: 2022-08-05

## 2021-10-24 ENCOUNTER — HEALTH MAINTENANCE LETTER (OUTPATIENT)
Age: 31
End: 2021-10-24

## 2021-10-27 ENCOUNTER — VIRTUAL VISIT (OUTPATIENT)
Dept: ONCOLOGY | Facility: CLINIC | Age: 31
End: 2021-10-27
Attending: STUDENT IN AN ORGANIZED HEALTH CARE EDUCATION/TRAINING PROGRAM
Payer: COMMERCIAL

## 2021-10-27 DIAGNOSIS — G43.109 MIGRAINE WITH AURA AND WITHOUT STATUS MIGRAINOSUS, NOT INTRACTABLE: Primary | ICD-10-CM

## 2021-10-27 DIAGNOSIS — M79.18 MUSCULOSKELETAL PAIN: ICD-10-CM

## 2021-10-27 DIAGNOSIS — Z51.5 ENCOUNTER FOR PALLIATIVE CARE: ICD-10-CM

## 2021-10-27 DIAGNOSIS — G71.01 DMD (DUCHENNE MUSCULAR DYSTROPHY) (H): ICD-10-CM

## 2021-10-27 DIAGNOSIS — M79.2 NEUROPATHIC PAIN: ICD-10-CM

## 2021-10-27 DIAGNOSIS — Z79.891 ENCOUNTER FOR LONG-TERM USE OF OPIATE ANALGESIC: ICD-10-CM

## 2021-10-27 PROCEDURE — 99214 OFFICE O/P EST MOD 30 MIN: CPT | Mod: 95 | Performed by: STUDENT IN AN ORGANIZED HEALTH CARE EDUCATION/TRAINING PROGRAM

## 2021-10-27 RX ORDER — SUMATRIPTAN 20 MG/1
SPRAY NASAL
Qty: 1 EACH | Refills: 3 | Status: SHIPPED | OUTPATIENT
Start: 2021-10-27

## 2021-10-27 NOTE — LETTER
"    10/27/2021         RE: Ernesto Harding  [address]  [facility] [address]        Dear Colleague,    Thank you for referring your patient, Ernesto Harding, to the [facility]. Please see a copy of my visit note below.    Ernesto is a 31 year old who is being evaluated via a billable video visit.      How would you like to obtain your AVS? MyChart  If the video visit is dropped, the invitation should be resent by: Text to cell phone: [phone]  Will anyone else be joining your video visit? 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      Robaxin, gabapentin 600 hs and 100mg in AM. 5 mg oxycodone prn with trach changes     Has 24/7 nursing.    Last Palliative care appointment: 7/30/21 with me.      Reviewed: Yes, no concerns.    Interim History:  Ernesto Harding 31 year old male is seen today for follow up with Palliative Care via billable video visit.  His home care nurse is also present and provides additional history.    Overall, things have been stable and \"really good\".  Did have some increased swelling in his upper arms, thighs, and ankles a few weeks ago, however this seems to improve over the last few weeks and he is not experiencing the same soreness anymore.  Had been thinking about adjusting gabapentin dose but now does not feel it is necessary.    Had an issue with his last G J-tube change where he did not receive lidocaine and became more anxious due to the discomfort.  They did end up having to give him an additional dose of oxycodone.  He states the 5 mg of oxycodone works very well for him at this point.    Has had an increase in migraines as of late.  Previously would have 1 about every 9 " "months and then duration of time shortness to a few times a week.  Symptoms included headache on one side of his head spreading down from his temple to his neck, light and sound sensitivity, and his vision being \"off\".  Family provided him with over-the-counter Excedrin and this did stop the migraines.  Has now gone a about 3 weeks without a migraine but is worried about them occurring again.  History of migraines and sister.       Social History:  No changes.  Has in-home nursing care.  Social History     Tobacco Use     Smoking status: Never Smoker     Smokeless tobacco: Never Used   Substance Use Topics     Alcohol use: Not on file     Drug use: Not on file       Family History- Reviewed in Epic.    Allergies   Allergen Reactions     Duloxetine Nausea and Vomiting     Pollen Extract        Advanced Care Planning: Has completed HCD.  His mother is his HCA. POLST on file stating DNAR/Full Treatment.    Medications- Reviewed in Epic.    Past Medical History- Reviewed in [facility].    Past Surgical History- Reviewed in Epic.    Review of Systems:   ROS: 10 point ROS neg other than the symptoms noted above in the HPI.      Physical Exam:   Constitutional: Alert young man sitting in a hospital bed in no acute distress.   ENT: Tracheostomy/ventilator.  Speech is slow and nearly normal through the trach.    Respiratory: Respirations unlabored, no cough, speaking full sentences.   MSK: Thin with advanced muscle loss.  Skin:  Skin with no obvious lesions. G-tube site is unremarkable to visual inspection on his belly.   Neuro: Face is symmetric.    Psych: Affect is full, pleasant, interactive.          Key Data Reviewed:  No new data since last visit.  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    Pain, MSK and Neuropathic  Muscle " spasms - Stable on current regimen, no need for changes at this time.  - Will continue gabapentin 100 mg qAM, 600 mg qPM  - Continue methocarbamol 500 mg 4 times daily as needed for muscle spasm  - Continue oxycodone 5 mg 30 minutes before trach and J-tube change  - Discussed that if he were to have increased pain during the day, we could add an additional 100mg dose gabapentin.    Migraines - Started increasing in frequency but have now spread out a gain. Responded to excederin.  -Provided prescription for Imitrex nasal spray as abortive agent for migraines.  -Requested he let me know if migraines become more frequent, such as several times weekly.  At that point, we may need to consider additional controller medication.      Follow up: About 3 months    Video-Visit Details  Video Start Time: 3:08PM  Video End Time: 3:26PM    Originating Location (pt. Location): Home     Distant Location (provider location): [facility]     Platform used for Video Visit: [employer]     Total time spent on day of encounter is 37 mins, including reviewing record, review of above studies, above visit with patient, and documentation.     Vicky Colvin DO  Palliative Medicine   Pager [phone], [facility] ID [id number]    Some chart documentation performed using Dragon Voice recognition Software. Although reviewed after completion, some words and grammatical errors may remain.        Again, thank you for allowing me to participate in the care of your patient.        Sincerely,        Vicky Colvin DO

## 2021-10-27 NOTE — PATIENT INSTRUCTIONS
Recommendations:  -Continue current medications the same. Let me know if we need to adjust the oxycodone. I'm ok with this being used around your J-tube changes.  -I sent a prescription for sumatriptan (migraine medication) to the pharmacy. When you feel a migraine starting, give one spray once in single nostril opposite to side of headache. If initial dose was ineffective or headache recurs, may repeat the dose after ?2 hours.   -Let me know if migraines become more frequent again.    Follow up: About 3 months      Reasons to Call    If you are having worsening/uncontrolled symptoms we want you to call!    You or your other physicians make any changes to medications we have prescribed.  -Please call for refills 4-5 days before you will run out of medication.    Important Phone Numbers    [place] [facility] - Shyanne Lamas. Palliative Care RN - [phone]    [facility] - Kat Laboy. Palliative Care RN - [phone]  *For Refills, scheduling, and general questions - [phone]  *After hours or on weekends. Will connect you with on call MD. [phone]

## 2021-10-27 NOTE — PROGRESS NOTES
"Ernesto is a 31 year old who is being evaluated via a billable video visit.      How would you like to obtain your AVS? MyChart  If the video visit is dropped, the invitation should be resent by: Text to cell phone: [phone]  Will anyone else be joining your video visit? 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      Robaxin, gabapentin 600 hs and 100mg in AM. 5 mg oxycodone prn with trach changes     Has 24/7 nursing.    Last Palliative care appointment: 7/30/21 with me.      Reviewed: Yes, no concerns.    Interim History:  Ernesto Harding 31 year old male is seen today for follow up with Palliative Care via billable video visit.  His home care nurse is also present and provides additional history.    Overall, things have been stable and \"really good\".  Did have some increased swelling in his upper arms, thighs, and ankles a few weeks ago, however this seems to improve over the last few weeks and he is not experiencing the same soreness anymore.  Had been thinking about adjusting gabapentin dose but now does not feel it is necessary.    Had an issue with his last G J-tube change where he did not receive lidocaine and became more anxious due to the discomfort.  They did end up having to give him an additional dose of oxycodone.  He states the 5 mg of oxycodone works very well for him at this point.    Has had an increase in migraines as of late.  Previously would have 1 about every 9 months and then duration of time shortness to a few times a week.  Symptoms included headache on one side of his head spreading down from his temple to his neck, light and sound sensitivity, and his vision being \"off\".  Family provided him with " over-the-counter Excedrin and this did stop the migraines.  Has now gone a about 3 weeks without a migraine but is worried about them occurring again.  History of migraines and sister.       Social History:  No changes.  Has in-home nursing care.  Social History     Tobacco Use     Smoking status: Never Smoker     Smokeless tobacco: Never Used   Substance Use Topics     Alcohol use: Not on file     Drug use: Not on file       Family History- Reviewed in Epic.    Allergies   Allergen Reactions     Duloxetine Nausea and Vomiting     Pollen Extract        Advanced Care Planning: Has completed HCD.  His mother is his HCA. POLST on file stating DNAR/Full Treatment.    Medications- Reviewed in Epic.    Past Medical History- Reviewed in [facility].    Past Surgical History- Reviewed in Epic.    Review of Systems:   ROS: 10 point ROS neg other than the symptoms noted above in the HPI.      Physical Exam:   Constitutional: Alert young man sitting in a hospital bed in no acute distress.   ENT: Tracheostomy/ventilator.  Speech is slow and nearly normal through the trach.    Respiratory: Respirations unlabored, no cough, speaking full sentences.   MSK: Thin with advanced muscle loss.  Skin:  Skin with no obvious lesions. G-tube site is unremarkable to visual inspection on his belly.   Neuro: Face is symmetric.    Psych: Affect is full, pleasant, interactive.          Key Data Reviewed:  No new data since last visit.  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    Pain, MSK and Neuropathic  Muscle spasms - Stable on current regimen, no need for changes at this time.  - Will continue gabapentin 100 mg qAM, 600 mg qPM  - Continue methocarbamol 500 mg 4 times daily as needed for muscle spasm  - Continue oxycodone 5 mg 30 minutes before trach  and J-tube change  - Discussed that if he were to have increased pain during the day, we could add an additional 100mg dose gabapentin.    Migraines - Started increasing in frequency but have now spread out a gain. Responded to excederin.  -Provided prescription for Imitrex nasal spray as abortive agent for migraines.  -Requested he let me know if migraines become more frequent, such as several times weekly.  At that point, we may need to consider additional controller medication.      Follow up: About 3 months    Video-Visit Details  Video Start Time: 3:08PM  Video End Time: 3:26PM    Originating Location (pt. Location): Home     Distant Location (provider location): [facility]     Platform used for Video Visit: [employer]     Total time spent on day of encounter is 37 mins, including reviewing record, review of above studies, above visit with patient, and documentation.     Vicky Colvin, DO  Palliative Medicine   Pager [phone], [facility] ID [id number]    Some chart documentation performed using Dragon Voice recognition Software. Although reviewed after completion, some words and grammatical errors may remain.

## 2021-10-27 NOTE — LETTER
"    10/27/2021         RE: Ernesto Harding  [address]  [facility] [place] [address]        Dear Colleague,    Thank you for referring your patient, Ernesto Harding, to the [facility]. Please see a copy of my visit note below.    Ernesto is a 31 year old who is being evaluated via a billable video visit.      How would you like to obtain your AVS? MyChart  If the video visit is dropped, the invitation should be resent by: Text to cell phone: [phone]  Will anyone else be joining your video visit? 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      Robaxin, gabapentin 600 hs and 100mg in AM. 5 mg oxycodone prn with trach changes     Has 24/7 nursing.    Last Palliative care appointment: 7/30/21 with me.      Reviewed: Yes, no concerns.    Interim History:  Ernesto Harding 31 year old male is seen today for follow up with Palliative Care via billable video visit.  His home care nurse is also present and provides additional history.    Overall, things have been stable and \"really good\".  Did have some increased swelling in his upper arms, thighs, and ankles a few weeks ago, however this seems to improve over the last few weeks and he is not experiencing the same soreness anymore.  Had been thinking about adjusting gabapentin dose but now does not feel it is necessary.    Had an issue with his last G J-tube change where he did not receive lidocaine and became more anxious due to the discomfort.  They did end up having to give him an additional dose of oxycodone.  He states the 5 mg of oxycodone works very well for him at this point.    Has had an increase in migraines as of late.  Previously would have 1 about every 9 " "months and then duration of time shortness to a few times a week.  Symptoms included headache on one side of his head spreading down from his temple to his neck, light and sound sensitivity, and his vision being \"off\".  Family provided him with over-the-counter Excedrin and this did stop the migraines.  Has now gone a about 3 weeks without a migraine but is worried about them occurring again.  History of migraines and sister.       Social History:  No changes.  Has in-home nursing care.  Social History     Tobacco Use     Smoking status: Never Smoker     Smokeless tobacco: Never Used   Substance Use Topics     Alcohol use: Not on file     Drug use: Not on file       Family History- Reviewed in Epic.    Allergies   Allergen Reactions     Duloxetine Nausea and Vomiting     Pollen Extract        Advanced Care Planning: Has completed HCD.  His mother is his HCA. POLST on file stating DNAR/Full Treatment.    Medications- Reviewed in Epic.    Past Medical History- Reviewed in [facility].    Past Surgical History- Reviewed in Epic.    Review of Systems:   ROS: 10 point ROS neg other than the symptoms noted above in the HPI.      Physical Exam:   Constitutional: Alert young man sitting in a hospital bed in no acute distress.   ENT: Tracheostomy/ventilator.  Speech is slow and nearly normal through the trach.    Respiratory: Respirations unlabored, no cough, speaking full sentences.   MSK: Thin with advanced muscle loss.  Skin:  Skin with no obvious lesions. G-tube site is unremarkable to visual inspection on his belly.   Neuro: Face is symmetric.    Psych: Affect is full, pleasant, interactive.          Key Data Reviewed:  No new data since last visit.  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    Pain, MSK and Neuropathic  Muscle " spasms - Stable on current regimen, no need for changes at this time.  - Will continue gabapentin 100 mg qAM, 600 mg qPM  - Continue methocarbamol 500 mg 4 times daily as needed for muscle spasm  - Continue oxycodone 5 mg 30 minutes before trach and J-tube change  - Discussed that if he were to have increased pain during the day, we could add an additional 100mg dose gabapentin.    Migraines - Started increasing in frequency but have now spread out a gain. Responded to excederin.  -Provided prescription for Imitrex nasal spray as abortive agent for migraines.  -Requested he let me know if migraines become more frequent, such as several times weekly.  At that point, we may need to consider additional controller medication.      Follow up: About 3 months    Video-Visit Details  Video Start Time: 3:08PM  Video End Time: 3:26PM    Originating Location (pt. Location): Home     Distant Location (provider location): [facility]     Platform used for Video Visit: [employer]     Total time spent on day of encounter is 37 mins, including reviewing record, review of above studies, above visit with patient, and documentation.     Vicky Colvin DO  Palliative Medicine   Pager [phone], AMCOM ID [id number]    Some chart documentation performed using Dragon Voice recognition Software. Although reviewed after completion, some words and grammatical errors may remain.        Again, thank you for allowing me to participate in the care of your patient.        Sincerely,        Vicky Colvin DO

## 2021-10-28 ENCOUNTER — PATIENT OUTREACH (OUTPATIENT)
Dept: ONCOLOGY | Facility: CLINIC | Age: 31
End: 2021-10-28

## 2021-10-28 NOTE — PROGRESS NOTES
Oncology Distress Screening Follow-up  Clinical Social Work  [facility]    Identified Concern and Score From Distress Screenin. How concerned are you about feeling anxious or very scared?   7Abnormal      Date of Distress Screening: 10/27/21    Intervention:   Ernesto is a 31-year-old with Duchenne's who is followed by palliative care. At time of visit with Dr. Vicky Colvin, Ernesto scored positive on oncology distress screen. This clinician called Ernesto today with goal of introducing psychosocial services, and following up on elevated distress.     This clinician called and spoke with nurse on speaker phone so that Ernesto could engage. Nurse acknowledged that Ernesto has more anxiety when procedures are happening, and was appreciative of discussion with palliative care about medication support for this. Nurse and Ernseto deny other concerns or needs at present, and express appreciation of social work outreach.     Follow-up Required:   Ernesto does not have an oncologic diagnosis, and will therefore not be followed by oncology social work in ongoing way. Ernesto knows to reach out to care team with psychosocial concerns or needs.     JAMES Gannon, [facility] Psychiatric CenterSW  Phone: [phone]  Splendorapaige Saha: M, Thu  *every other Tue, 8am-4:30pm  Dante Howe: W, F, *every other Tue, 8am-4:30pm

## 2021-12-03 ENCOUNTER — PATIENT OUTREACH (OUTPATIENT)
Dept: PALLIATIVE CARE | Facility: CLINIC | Age: 31
End: 2021-12-03
Payer: COMMERCIAL

## 2021-12-03 DIAGNOSIS — Z93.0 TRACHEOSTOMY PRESENT (H): ICD-10-CM

## 2021-12-03 DIAGNOSIS — G71.01 DMD (DUCHENNE MUSCULAR DYSTROPHY) (H): ICD-10-CM

## 2021-12-03 DIAGNOSIS — Z93.4 JEJUNOSTOMY TUBE PRESENT (H): ICD-10-CM

## 2021-12-03 RX ORDER — OXYCODONE HCL 5 MG/5 ML
5 SOLUTION, ORAL ORAL SEE ADMIN INSTRUCTIONS
Qty: 20 ML | Refills: 0 | Status: SHIPPED | OUTPATIENT
Start: 2021-12-03 | End: 2022-01-28

## 2021-12-03 NOTE — PROGRESS NOTES
Received VM from patient's home care nurse requesting refill of oxycodone.     Last refill: 10/12/2021  Last office visit: 10/27/2021  Scheduled for follow up 1/28/2021    Will route request to MD for review.     Reviewed [place]  Report.

## 2021-12-15 ENCOUNTER — PATIENT OUTREACH (OUTPATIENT)
Dept: PALLIATIVE CARE | Facility: CLINIC | Age: 31
End: 2021-12-15
Payer: COMMERCIAL

## 2021-12-15 DIAGNOSIS — R07.81 RIB PAIN: ICD-10-CM

## 2021-12-15 DIAGNOSIS — M79.18 MUSCULOSKELETAL PAIN: ICD-10-CM

## 2021-12-15 DIAGNOSIS — G71.01 DMD (DUCHENNE MUSCULAR DYSTROPHY) (H): ICD-10-CM

## 2021-12-15 DIAGNOSIS — M79.2 NEUROPATHIC PAIN: ICD-10-CM

## 2021-12-15 RX ORDER — GABAPENTIN 300 MG/1
600 CAPSULE ORAL AT BEDTIME
Qty: 60 CAPSULE | Refills: 3 | Status: SHIPPED | OUTPATIENT
Start: 2021-12-15 | End: 2022-04-14

## 2021-12-15 RX ORDER — GABAPENTIN 100 MG/1
CAPSULE ORAL
Qty: 30 CAPSULE | Refills: 3 | Status: SHIPPED | OUTPATIENT
Start: 2021-12-15 | End: 2022-02-01

## 2021-12-15 NOTE — PROGRESS NOTES
Received VM from patient's home care nurse requesting refills of gabapentin (both strengths).     Last refills: 7/30/2021  Last office visit: 7/30/2021  Scheduled for follow up 2/1/2022     Will route request to MD for review.     Reviewed [place]  Report.

## 2022-01-27 NOTE — PROGRESS NOTES
Ernesto is a 31 year old who is being evaluated via a billable video visit.      How would you like to obtain your AVS? MyChart  If the video visit is dropped, the invitation should be resent by: Send to e-mail at: [email or web address]  Will anyone else be joining your video visit? 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      Robaxin, gabapentin 600 hs and 100mg in AM. 5 mg oxycodone prn with trach changes     Has 24/7 nursing.    Last Palliative care appointment: 10/27/21 with me. Rx for intranasal imitrex as abortive therapy for migraines.     Reviewed: Yes, no concerns.    Interim History:  Ernesto Harding 31 year old male is seen today for follow up with Palliative Care via billable video visit.  One of his home nurses is also present on the call and provides additional history.     Had an episode on 1/28 where he hurt his back being moved.  Based on the symptoms he had and how the pain was, he believes he likely had another compression fracture as he has had these in the past.  He denies any sensory changes.  He has been able to manage the discomfort with 1000 mg Tylenol Q6-8H (max 3000mg in 24 hours) and Aleve Q12H. as his current prescription of oxycodone states only for trach and J-tube changes, will give a dose of oxycodone for this new pain.  They are wondering if the prescription could allow for a dose to be given if she has pain from a vertebral fracture.    He notes some increased soreness some days later in the evening and has wondered about adding in an additional dose of gabapentin at that time.  He is not sure he would need it every day but thinks having flexibility having an additional dose  would be helpful.    He states he has not had another migraine since her last visit, so he has not had to use the Imitrex.       Social History:  No changes.  Has in-home nursing care.  Social History     Tobacco Use     Smoking status: Never Smoker     Smokeless tobacco: Never Used   Substance Use Topics     Alcohol use: Not on file     Drug use: Not on file       Family History- Reviewed in Epic.    Allergies   Allergen Reactions     Duloxetine Nausea and Vomiting     Pollen Extract        Advanced Care Planning: Has completed HCD.  His mother is his HCA. POLST on file stating DNAR/Full Treatment.    Medications- Reviewed in Epic.    Past Medical History- Reviewed in [facility].    Past Surgical History- Reviewed in Epic.    Review of Systems:   ROS: 10 point ROS neg other than the symptoms noted above in the HPI.      Physical Exam:   Constitutional: Alert young man sitting in a hospital bed in no acute distress.   ENT: Tracheostomy/ventilator.  Speech is slow and nearly normal through the trach.    Respiratory: Respirations unlabored, no cough, speaking full sentences.   MSK: Thin with advanced muscle loss.  Skin:  Skin with no obvious lesions. G-tube site is unremarkable to visual inspection on his belly.   Neuro: Face is symmetric.    Psych: Affect is full, pleasant, interactive.         Key Data Reviewed:  None new since last visit.    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    Pain, MSK and Neuropathic  Muscle spasms  New probable vertebral fracture - Prior to incident on 1/28, he was stable on current regimen.  Currently managing symptoms well with OTC meds, however I do think it would be appropriate to allow for oxycodone if needed for severe pain.  His use of the oxycodone has been completely appropriate, and  being able to adequately manage compression fracture pain at home would allow him to avoid hospitalization.  Additionally, I agree that having another dose of gabapentin available later in the afternoon is reasonable.  - Will change gabapentin to 100 mg BID PRN knowing he will take a dose every morning, but this will allow flexibility to take a second dose later in the afternoon or evening prior to bedtime.  He will continue gabapentin 600 mg qPM for now.  - Continue methocarbamol 500 mg 4 times daily as needed for muscle spasm  - Continue oxycodone 5 mg 30 minutes before trach and J-tube change.  I also added that oxycodone can be given for severe pain up to every 4 hours PRN.  We are hoping he will avoid another compression fracture, however given that he is prone to these types of injuries due to his frailty, I think it makes sense for him to have a little bit stronger pain medication available.    Migraines - Has not had a migraine since our last visit.   -Has Imitrex nasal spray as abortive agent for migraines if they return.      I shared with nursing staff the importance of using the on-call palliative care number if an acute event occurs after hours or on weekends.    Follow up: 3 months    Video-Visit Details  Video Start Time:  4:15 PM  Video End Time:  4:36 PM    Originating Location (pt. Location): Home     Distant Location (provider location): [facility]     Platform used for Video Visit: [email or web address]     Total time spent on day of encounter is 35 mins, including reviewing record, review of above studies, above visit with patient, symptomatic discussion of different types of pain and migraines, including medication adjustments/prescription management, and documentation.     Vicky Colvin DO  Palliative Medicine   Pager [phone], AMCOM ID [id number]    Some chart documentation performed using Dragon Voice recognition Software. Although reviewed after completion, some words and  grammatical errors may remain.

## 2022-01-28 ENCOUNTER — TELEPHONE (OUTPATIENT)
Dept: PALLIATIVE CARE | Facility: CLINIC | Age: 32
End: 2022-01-28
Payer: COMMERCIAL

## 2022-01-28 DIAGNOSIS — Z93.4 JEJUNOSTOMY TUBE PRESENT (H): ICD-10-CM

## 2022-01-28 DIAGNOSIS — G71.01 DMD (DUCHENNE MUSCULAR DYSTROPHY) (H): ICD-10-CM

## 2022-01-28 DIAGNOSIS — Z93.0 TRACHEOSTOMY PRESENT (H): ICD-10-CM

## 2022-01-28 RX ORDER — OXYCODONE HCL 5 MG/5 ML
5 SOLUTION, ORAL ORAL SEE ADMIN INSTRUCTIONS
Qty: 20 ML | Refills: 0 | Status: SHIPPED | OUTPATIENT
Start: 2022-01-28 | End: 2022-02-01

## 2022-01-28 NOTE — TELEPHONE ENCOUNTER
Received telephone call from patient 's homecare nurse Tanja requesting refill of oxycodone.     Last refill: 12/3/21  Last office visit: 10/27/21  Scheduled for follow up 2/1/22     Will route request to MD for review.     Reviewed [place]  Report.

## 2022-01-28 NOTE — TELEPHONE ENCOUNTER
Tanja RN from [facility], attempting to reach the palliative care team for this pt about pain med refill.     Pt is part of palliative team Sunny with Dr. Vicky Colvin.     Routing message to palliative care and [place] team.

## 2022-02-01 ENCOUNTER — VIRTUAL VISIT (OUTPATIENT)
Dept: PALLIATIVE CARE | Facility: CLINIC | Age: 32
End: 2022-02-01
Attending: STUDENT IN AN ORGANIZED HEALTH CARE EDUCATION/TRAINING PROGRAM
Payer: COMMERCIAL

## 2022-02-01 DIAGNOSIS — M79.2 NEUROPATHIC PAIN: ICD-10-CM

## 2022-02-01 DIAGNOSIS — Z87.81 HISTORY OF VERTEBRAL FRACTURE: ICD-10-CM

## 2022-02-01 DIAGNOSIS — M79.18 MUSCULOSKELETAL PAIN: ICD-10-CM

## 2022-02-01 DIAGNOSIS — Z93.0 TRACHEOSTOMY PRESENT (H): ICD-10-CM

## 2022-02-01 DIAGNOSIS — Z93.4 JEJUNOSTOMY TUBE PRESENT (H): ICD-10-CM

## 2022-02-01 DIAGNOSIS — G43.109 MIGRAINE WITH AURA AND WITHOUT STATUS MIGRAINOSUS, NOT INTRACTABLE: ICD-10-CM

## 2022-02-01 DIAGNOSIS — G71.01 DMD (DUCHENNE MUSCULAR DYSTROPHY) (H): Primary | ICD-10-CM

## 2022-02-01 PROCEDURE — 99214 OFFICE O/P EST MOD 30 MIN: CPT | Mod: 95 | Performed by: STUDENT IN AN ORGANIZED HEALTH CARE EDUCATION/TRAINING PROGRAM

## 2022-02-01 RX ORDER — OXYCODONE HCL 5 MG/5 ML
5 SOLUTION, ORAL ORAL EVERY 4 HOURS PRN
Qty: 40 ML | Refills: 0 | Status: SHIPPED | OUTPATIENT
Start: 2022-02-01 | End: 2022-02-28

## 2022-02-01 RX ORDER — TRIAMCINOLONE ACETONIDE 5 MG/G
CREAM TOPICAL
COMMUNITY
Start: 2022-01-10

## 2022-02-01 RX ORDER — GABAPENTIN 100 MG/1
CAPSULE ORAL
Qty: 60 CAPSULE | Refills: 3 | Status: SHIPPED | OUTPATIENT
Start: 2022-02-01 | End: 2022-06-06

## 2022-02-01 NOTE — LETTER
2/1/2022       RE: Ernesto Harding  [address]  [facility] [place] [address]     Dear Colleague,    Thank you for referring your patient, Ernesto Harding[facility]. Please see a copy of my visit note below.        Palliative Care Progress Note    Patient Name: Ernesto Harding  Primary Provider: Arianne Weeks    Chief Complaint/Patient ID: 32 yo M with Duchenne's s/p trach/vent & JT     Referred/transferred to us from [facility] care due to advancing age.      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      Robaxin, gabapentin 600 hs and 100mg in AM. 5 mg oxycodone prn with trach changes     Has 24/7 nursing.    Last Palliative care appointment: 10/27/21 with me. Rx for intranasal imitrex as abortive therapy for migraines.     Reviewed: Yes, no concerns.    Interim History:  Ernesto Harding 31 year old male is seen today for follow up with Palliative Care via billable video visit.  One of his home nurses is also present on the call and provides additional history.     Had an episode on 1/28 where he hurt his back being moved.  Based on the symptoms he had and how the pain was, he believes he likely had another compression fracture as he has had these in the past.  He denies any sensory changes.  He has been able to manage the discomfort with 1000 mg Tylenol Q6-8H (max 3000mg in 24 hours) and Aleve Q12H. as his current prescription of oxycodone states only for trach and J-tube changes, will give a dose of oxycodone for this new pain.  They are wondering if the prescription could allow for a dose to be given if she has pain from a vertebral fracture.    He notes some increased soreness some days later in the evening and has wondered about adding in an additional dose of gabapentin at that time.  He is not sure he would need it every day but thinks having flexibility having an additional dose would be  helpful.    He states he has not had another migraine since her last visit, so he has not had to use the Imitrex.       Social History:  No changes.  Has in-home nursing care.  Social History     Tobacco Use     Smoking status: Never Smoker     Smokeless tobacco: Never Used   Substance Use Topics     Alcohol use: Not on file     Drug use: Not on file       Family History- Reviewed in Epic.    Allergies   Allergen Reactions     Duloxetine Nausea and Vomiting     Pollen Extract        Advanced Care Planning: Has completed HCD.  His mother is his HCA. POLST on file stating DNAR/Full Treatment.    Medications- Reviewed in Epic.    Past Medical History- Reviewed in [facility].    Past Surgical History- Reviewed in Epic.    Review of Systems:   ROS: 10 point ROS neg other than the symptoms noted above in the HPI.      Physical Exam:   Constitutional: Alert young man sitting in a hospital bed in no acute distress.   ENT: Tracheostomy/ventilator.  Speech is slow and nearly normal through the trach.    Respiratory: Respirations unlabored, no cough, speaking full sentences.   MSK: Thin with advanced muscle loss.  Skin:  Skin with no obvious lesions. G-tube site is unremarkable to visual inspection on his belly.   Neuro: Face is symmetric.    Psych: Affect is full, pleasant, interactive.         Key Data Reviewed:  None new since last visit.    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    Pain, MSK and Neuropathic  Muscle spasms  New probable vertebral fracture - Prior to incident on 1/28, he was stable on current regimen.  Currently managing symptoms well with OTC meds, however I do think it would be appropriate to allow for oxycodone if needed for severe pain.  His use of the oxycodone has been completely appropriate, and being able  to adequately manage compression fracture pain at home would allow him to avoid hospitalization.  Additionally, I agree that having another dose of gabapentin available later in the afternoon is reasonable.  - Will change gabapentin to 100 mg BID PRN knowing he will take a dose every morning, but this will allow flexibility to take a second dose later in the afternoon or evening prior to bedtime.  He will continue gabapentin 600 mg qPM for now.  - Continue methocarbamol 500 mg 4 times daily as needed for muscle spasm  - Continue oxycodone 5 mg 30 minutes before trach and J-tube change.  I also added that oxycodone can be given for severe pain up to every 4 hours PRN.  We are hoping he will avoid another compression fracture, however given that he is prone to these types of injuries due to his frailty, I think it makes sense for him to have a little bit stronger pain medication available.    Migraines - Has not had a migraine since our last visit.   -Has Imitrex nasal spray as abortive agent for migraines if they return.      I shared with nursing staff the importance of using the on-call palliative care number if an acute event occurs after hours or on weekends.    Follow up: 3 months    Video-Visit Details  Video Start Time:  4:15 PM  Video End Time:  4:36 PM    Originating Location (pt. Location): Home     Distant Location (provider location): [facility]     Platform used for Video Visit: [employer]     Total time spent on day of encounter is 35 mins, including reviewing record, review of above studies, above visit with patient, symptomatic discussion of different types of pain and migraines, including medication adjustments/prescription management, and documentation.     Vicky Colvin,   Palliative Medicine   Pager [phone], AMCOM ID [id number]    Some chart documentation performed using Dragon Voice recognition Software. Although reviewed after completion, some words and grammatical  errors may remain.        Again, thank you for allowing me to participate in the care of your patient.      Sincerely,    Vicky Colvin, DO

## 2022-02-01 NOTE — PATIENT INSTRUCTIONS
Recommendations:  -I have updated the oxycodone prescription to reflect that in addition to getting a dose prior to trach and J-tube changes, you can also have a dose every 4 hours as needed for additional pain.  Please let me know if this prescription is not sufficient or if there is other clarification that we need to do as far as administration of the medication goes.  -I have updated your gabapentin prescription to allow for an additional 100 mg dose during the day as needed.  Please let me know if this is not sufficient and if we need to increase further to help with that additional soreness that you have later in the day.    Follow up: 3 months      Reasons to Call    If you are having worsening/uncontrolled symptoms we want you to call!    You or your other physicians make any changes to medications we have prescribed.  -Please call for refills 4-5 days before you will run out of medication.    Important Phone Numbers    [place] [facility] - Shyanne Lamas. Palliative Care RN - [phone]    [facility] - Kat Laboy. Palliative Care RN - [phone]  *For Refills, scheduling, and general questions - [phone]  *After hours or on weekends. Will connect you with on call MD. [phone]

## 2022-02-28 DIAGNOSIS — Z93.4 JEJUNOSTOMY TUBE PRESENT (H): ICD-10-CM

## 2022-02-28 DIAGNOSIS — G71.01 DMD (DUCHENNE MUSCULAR DYSTROPHY) (H): ICD-10-CM

## 2022-02-28 DIAGNOSIS — Z93.0 TRACHEOSTOMY PRESENT (H): ICD-10-CM

## 2022-02-28 RX ORDER — OXYCODONE HCL 5 MG/5 ML
5 SOLUTION, ORAL ORAL EVERY 4 HOURS PRN
Qty: 40 ML | Refills: 0 | Status: SHIPPED | OUTPATIENT
Start: 2022-02-28 | End: 2022-04-25

## 2022-02-28 NOTE — TELEPHONE ENCOUNTER
Received telephone call from patient's caregiver requesting refill of oxycodone.     Last refill: 2/1/22  Last office visit: 2/1/22  Scheduled for follow up 5/10/22     Will route request to MD for review.     Reviewed [place]  Report.

## 2022-04-14 ENCOUNTER — PATIENT OUTREACH (OUTPATIENT)
Dept: PALLIATIVE CARE | Facility: CLINIC | Age: 32
End: 2022-04-14
Payer: COMMERCIAL

## 2022-04-14 DIAGNOSIS — R07.81 RIB PAIN: ICD-10-CM

## 2022-04-14 DIAGNOSIS — M79.2 NEUROPATHIC PAIN: ICD-10-CM

## 2022-04-14 NOTE — PROGRESS NOTES
Received voicemail from patient's mother requesting refill of gabapentin 300mg.     Last refill: 12/15/2021  Last office visit: 2/1/2022  Scheduled for follow up 5/10/2022    Will route request to MD for review.     Reviewed [place]  Report.

## 2022-04-15 RX ORDER — GABAPENTIN 300 MG/1
600 CAPSULE ORAL AT BEDTIME
Qty: 60 CAPSULE | Refills: 3 | Status: SHIPPED | OUTPATIENT
Start: 2022-04-15 | End: 2022-06-06

## 2022-04-25 ENCOUNTER — PATIENT OUTREACH (OUTPATIENT)
Dept: PALLIATIVE CARE | Facility: CLINIC | Age: 32
End: 2022-04-25
Payer: COMMERCIAL

## 2022-04-25 DIAGNOSIS — Z93.0 TRACHEOSTOMY PRESENT (H): ICD-10-CM

## 2022-04-25 DIAGNOSIS — G71.01 DMD (DUCHENNE MUSCULAR DYSTROPHY) (H): ICD-10-CM

## 2022-04-25 DIAGNOSIS — Z93.4 JEJUNOSTOMY TUBE PRESENT (H): ICD-10-CM

## 2022-04-25 NOTE — PROGRESS NOTES
Received voicemail from patient's mother requesting refill of oxycodone.     Last refill: 2/28/2022  Last office visit: 2/1/2022  Scheduled for follow up 5/10/2022    Will route request to MD for review.     Reviewed [place]  Report.

## 2022-04-26 RX ORDER — OXYCODONE HCL 5 MG/5 ML
5 SOLUTION, ORAL ORAL EVERY 4 HOURS PRN
Qty: 40 ML | Refills: 0 | Status: SHIPPED | OUTPATIENT
Start: 2022-04-26 | End: 2022-07-20
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
## 2022-05-10 ENCOUNTER — VIRTUAL VISIT (OUTPATIENT)
Dept: PALLIATIVE CARE | Facility: CLINIC | Age: 32
End: 2022-05-10
Attending: STUDENT IN AN ORGANIZED HEALTH CARE EDUCATION/TRAINING PROGRAM
Payer: COMMERCIAL

## 2022-05-10 DIAGNOSIS — R07.81 RIB PAIN: ICD-10-CM

## 2022-05-10 DIAGNOSIS — Z93.0 TRACHEOSTOMY PRESENT (H): ICD-10-CM

## 2022-05-10 DIAGNOSIS — Z87.81 HISTORY OF VERTEBRAL FRACTURE: ICD-10-CM

## 2022-05-10 DIAGNOSIS — Z93.4 JEJUNOSTOMY TUBE PRESENT (H): ICD-10-CM

## 2022-05-10 DIAGNOSIS — G71.01 DMD (DUCHENNE MUSCULAR DYSTROPHY) (H): Primary | ICD-10-CM

## 2022-05-10 DIAGNOSIS — Z51.5 ENCOUNTER FOR PALLIATIVE CARE: ICD-10-CM

## 2022-05-10 DIAGNOSIS — Z79.891 ENCOUNTER FOR LONG-TERM USE OF OPIATE ANALGESIC: ICD-10-CM

## 2022-05-10 DIAGNOSIS — M79.2 NEUROPATHIC PAIN: ICD-10-CM

## 2022-05-10 DIAGNOSIS — M79.18 MUSCULOSKELETAL PAIN: ICD-10-CM

## 2022-05-10 PROCEDURE — 99214 OFFICE O/P EST MOD 30 MIN: CPT | Mod: 95 | Performed by: STUDENT IN AN ORGANIZED HEALTH CARE EDUCATION/TRAINING PROGRAM
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
## 2022-05-10 NOTE — PATIENT INSTRUCTIONS
Recommendations:  -I have placed the lab orders so that we can check your kidney function.  Assuming everything looks okay with this, we can plan to increase the gabapentin.  -Okay to continue the oxycodone as you have been for trach and J-tube changes as well as for episodes of severe pain.    Follow up: 3 to 4 months, but please reach out sooner with questions or concerns.      Reasons to Call    If you are having worsening/uncontrolled symptoms we want you to call!    You or your other physicians make any changes to medications we have prescribed.  -Please call for refills 4-5 days before you will run out of medication.    Important Phone Numbers    [facility] - Shyanne Lamas. Palliative Care RN - [phone]    [facility] - Kat Laboy. Palliative Care RN - [phone]  *For Refills, scheduling, and general questions - [phone]  *After hours or on weekends. Will connect you with on call MD. [phone]

## 2022-05-18 ENCOUNTER — PATIENT OUTREACH (OUTPATIENT)
Dept: PALLIATIVE CARE | Facility: CLINIC | Age: 32
End: 2022-05-18
Payer: COMMERCIAL

## 2022-05-18 NOTE — PROGRESS NOTES
Received VM from patient's home care nurse Tanja. She reports patient is going into clinic today for a lab draw, wants to be sure orders from Dr. Colvin have been sent in.     Called and spoke with Tanja - [facility]. She gives me phone # [phone] to contact them. Advised I would contact them and send order over - asked them to be sure to ask lab if they are drawing CMP order faxed by our team as well as a double check.     Called and was transferred to lab. Explained situation.   La in lab asked me to fax order to fax #[phone], the [place] location. This was done.     Phone # for that location is #[phone] -- Called and spoke with Joycelyn who confirmed they received our order and will add that in this afternoon when patient comes for other lab draws.

## 2022-05-31 ENCOUNTER — PATIENT OUTREACH (OUTPATIENT)
Dept: PALLIATIVE CARE | Facility: CLINIC | Age: 32
End: 2022-05-31
Payer: COMMERCIAL

## 2022-05-31 DIAGNOSIS — G71.01 DMD (DUCHENNE MUSCULAR DYSTROPHY) (H): ICD-10-CM

## 2022-05-31 DIAGNOSIS — R07.81 RIB PAIN: ICD-10-CM

## 2022-05-31 DIAGNOSIS — M79.2 NEUROPATHIC PAIN: ICD-10-CM

## 2022-05-31 DIAGNOSIS — M79.18 MUSCULOSKELETAL PAIN: ICD-10-CM

## 2022-05-31 NOTE — PROGRESS NOTES
"Per MD, lab results okay. \"Okay to increase scheduled dose of gabapentin at bedtime to be 900 mg and continue 100 mg in the morning. \"    Called Tanja back and advised of the above. She verbalized understanding and will update patient/patient's family.  "

## 2022-06-05 ENCOUNTER — HEALTH MAINTENANCE LETTER (OUTPATIENT)
Age: 32
End: 2022-06-05

## 2022-06-06 RX ORDER — GABAPENTIN 300 MG/1
900 CAPSULE ORAL AT BEDTIME
Qty: 90 CAPSULE | Refills: 3 | Status: SHIPPED | OUTPATIENT
Start: 2022-06-06 | End: 2022-09-23

## 2022-06-06 RX ORDER — GABAPENTIN 100 MG/1
CAPSULE ORAL
Qty: 60 CAPSULE | Refills: 3 | Status: SHIPPED | OUTPATIENT
Start: 2022-06-06 | End: 2022-12-29

## 2022-06-06 NOTE — PROGRESS NOTES
Received VM from patient's home care nurse, she asks for updated scripts to be sent to patient's pharmacy for new order.

## 2022-07-20 ENCOUNTER — PATIENT OUTREACH (OUTPATIENT)
Dept: PALLIATIVE CARE | Facility: CLINIC | Age: 32
End: 2022-07-20

## 2022-07-20 DIAGNOSIS — Z93.0 TRACHEOSTOMY PRESENT (H): ICD-10-CM

## 2022-07-20 DIAGNOSIS — G71.01 DMD (DUCHENNE MUSCULAR DYSTROPHY) (H): ICD-10-CM

## 2022-07-20 DIAGNOSIS — Z93.4 JEJUNOSTOMY TUBE PRESENT (H): ICD-10-CM

## 2022-07-20 RX ORDER — OXYCODONE HCL 5 MG/5 ML
5 SOLUTION, ORAL ORAL EVERY 4 HOURS PRN
Qty: 40 ML | Refills: 0 | Status: SHIPPED | OUTPATIENT
Start: 2022-07-20 | End: 2022-09-06

## 2022-07-20 NOTE — PROGRESS NOTES
[facility]: Palliative Care                                                                                        Received voicemail from patient's home care nurse, Tanja, requesting refill of oxycodone.     Last refill: 4/26/2022  Last office visit: 5/10/2022  Scheduled for follow up 11/10/2022    Will route request to MD for review.     Reviewed [place]  Report.       Signature:  DWIGHT VásquezN, RN, OCN

## 2022-08-05 DIAGNOSIS — M79.18 MUSCULOSKELETAL PAIN: ICD-10-CM

## 2022-08-05 DIAGNOSIS — R07.81 RIB PAIN ON LEFT SIDE: ICD-10-CM

## 2022-08-05 DIAGNOSIS — M62.838 MUSCLE SPASM: ICD-10-CM

## 2022-08-05 RX ORDER — METHOCARBAMOL 500 MG/1
500 TABLET, FILM COATED ORAL 4 TIMES DAILY PRN
Qty: 120 TABLET | Refills: 6 | Status: SHIPPED | OUTPATIENT
Start: 2022-08-05 | End: 2023-04-13

## 2022-08-05 NOTE — TELEPHONE ENCOUNTER
Received voicemail from patient requesting refill of methocarbamol.     Last office visit: 5/10/22  Scheduled for follow up 11/10/22     Will route request to MD for review.

## 2022-09-06 ENCOUNTER — PATIENT OUTREACH (OUTPATIENT)
Dept: PALLIATIVE CARE | Facility: CLINIC | Age: 32
End: 2022-09-06

## 2022-09-06 DIAGNOSIS — G71.01 DMD (DUCHENNE MUSCULAR DYSTROPHY) (H): ICD-10-CM

## 2022-09-06 DIAGNOSIS — Z93.0 TRACHEOSTOMY PRESENT (H): ICD-10-CM

## 2022-09-06 DIAGNOSIS — Z93.4 JEJUNOSTOMY TUBE PRESENT (H): ICD-10-CM

## 2022-09-06 RX ORDER — OXYCODONE HCL 5 MG/5 ML
5 SOLUTION, ORAL ORAL EVERY 4 HOURS PRN
Qty: 40 ML | Refills: 0 | Status: SHIPPED | OUTPATIENT
Start: 2022-09-06 | End: 2022-11-02

## 2022-09-06 NOTE — PROGRESS NOTES
[facility]: Palliative Care                                                                                        Per MD - can try dose of 1000mg robaxin either before bed (for goal of less pain when waking up) or first thing in the AM (to help with the increased AM pain). Called and spoke to patient's home care nurse and relayed this recommendation. They are in agreement to give this a try. She notes he doesn't usually take nighttime robaxin dose until midnight, so they will plan to increase that dose to see if that helps. Asked them to call with any other questions.    Signature:  Kat Laboy, DWIGHTN, RN, OCN

## 2022-09-06 NOTE — PROGRESS NOTES
[facility]: Palliative Care                                                                                        Received voicemail from home care nurse working with patient requesting refill of oxycodone. She also asks for call back, has a question and wants to schedule apt.      Called Tanja back - patient asked Tanja to ask about some increased tightness in top of thighs. Has been working on that in PT. He doesn't think that is getting any better so wanted to ask about that. AM worse then other times per day. I asked about robaxin and he thinks it maybe worked to help for a while, but not much anymore. Has been going on 3-4 weeks now. He just wanted Tanja to mention to us in case Dr. Colvin had other suggestions.     Last refill: 7/20/2022  Last office visit: 5/10/2022  Scheduled for follow up 11/10/2022    Will route request to MD for review.     Reviewed [place]  Report.       Signature:  Kat Laboy, DWIGHTN, RN, OCN

## 2022-09-14 ENCOUNTER — PATIENT OUTREACH (OUTPATIENT)
Dept: PALLIATIVE CARE | Facility: CLINIC | Age: 32
End: 2022-09-14

## 2022-09-14 NOTE — PROGRESS NOTES
[facility]: Palliative Care                                                                                        Received VM from patient's home care nurse Tanja. Increased robaxin did not help patient's leg pain and he wants to have an apt with Dr. Colvin soon to discuss.     Message to  to reach out to get this set.    Will send FYI to provider.     Signature:  Kat Laboy, DWIGHTN, RN, OCN

## 2022-09-22 DIAGNOSIS — M79.2 NEUROPATHIC PAIN: ICD-10-CM

## 2022-09-22 DIAGNOSIS — R07.81 RIB PAIN: ICD-10-CM

## 2022-09-23 RX ORDER — GABAPENTIN 300 MG/1
CAPSULE ORAL
Qty: 90 CAPSULE | Refills: 3 | Status: SHIPPED | OUTPATIENT
Start: 2022-09-23 | End: 2023-02-21

## 2022-09-23 NOTE — TELEPHONE ENCOUNTER
[facility]: Palliative Care                                                                                        Received mychart message from pharmacy requesting refill of gabapentin.     Last refill: 6/6/2022  Last office visit: 5/10/2022  Scheduled for follow up 11/10/2022 (though scheduling is working on moving apt up)    Will route request to MD for review.     Reviewed [place]  Report.       Signature:  Kat Laboy, DWIGHTN, RN, OCN

## 2022-10-04 ENCOUNTER — VIRTUAL VISIT (OUTPATIENT)
Dept: RADIATION ONCOLOGY | Facility: HOSPITAL | Age: 32
End: 2022-10-04
Attending: CLINICAL NURSE SPECIALIST
Payer: COMMERCIAL

## 2022-10-04 DIAGNOSIS — M62.838 MUSCLE SPASM: ICD-10-CM

## 2022-10-04 DIAGNOSIS — Z51.5 ENCOUNTER FOR PALLIATIVE CARE: ICD-10-CM

## 2022-10-04 DIAGNOSIS — G89.4 CHRONIC PAIN SYNDROME: ICD-10-CM

## 2022-10-04 DIAGNOSIS — R07.81 RIB PAIN ON LEFT SIDE: Primary | ICD-10-CM

## 2022-10-04 DIAGNOSIS — Z87.81 HISTORY OF VERTEBRAL FRACTURE: ICD-10-CM

## 2022-10-04 DIAGNOSIS — G71.01 DMD (DUCHENNE MUSCULAR DYSTROPHY) (H): ICD-10-CM

## 2022-10-04 PROCEDURE — 99215 OFFICE O/P EST HI 40 MIN: CPT | Mod: 95 | Performed by: CLINICAL NURSE SPECIALIST

## 2022-10-04 NOTE — PATIENT INSTRUCTIONS
Chronic pain syndrome 2/2 vertebral fractures and chronic rib pain, lower extremity muscle spasms  Largest complaint: Increased spasms, tightness and pain starting behind knees and through to ankes first hour upon waking 8  - Continue Oxycodone 5 mg prn 30 minutes prior to tracheostomy changes and every 4 hours as need for severe pain.  Uses prn dosing rarely.  - Continue Gabapentin 100 mg qam and 900 mg at bedtime.  - Discussed Robaxin 1000 mg in AM then 500 mg in afternoon and evening. Perhaps higher dose in AM will help with muscle relaxation.  - Discussed trying heat with heating pads in am to warm muscles then massage with biofreeze or tiger balm. Will try Biofreeze first.

## 2022-10-04 NOTE — NURSING NOTE
"[facility]  Palliative Care Daily Progress Note      Code status: Do Not Attempt Resuscitation (DNAR)     Impressions, Recommendations & Counseling     SYMPTOM ASSESSMENT:  1. Chronic pain syndrome 2/2 vertebral fractures and chronic rib pain, muscle spasms  Largest complaint: Increased spasms, tightness and pain starting behind knees and through to ankes first hour upon waking 8/10 pain  - Oxycodone 5 mg prn 30 minutes prior to tracheostomy changes and every 4 hours as need for severe pain.  Uses prn dosing rarely.  - Continue Gabapentin 100 mg qam and 900 mg at bedtime.  - Discussed Robaxin 1000 mg in AM then 500 mg in afternoon and evening. Perhaps higher dose in AM will help with muscle relaxation.  (unable to use \"stronger\" muscle relaxants due to prolongation of QT interval and side effect risk out weighs benefit per cardiology. I concur).  - Discussed trying heat with heating pads in am to warm muscles then massage with biofreeze or tiger balm. Will try Biofreeze first.    2. Migraine - infrequent, Last one over 9 months ago  - Imitrex prn    ADVANCED CARE PLANNING  Has completed HCD.  His mother is his HCA. POLST (11/2019)on file stating DNAR/Full Treatment.    GOALS OF CARE DISCUSSION  - Continue current cares, treatments and following with specialists.        HPI    Last seen in Palliative Care clinic on 5/10/2022 by Dr. Vicky Colvin.     Information obtained from patient, chart review and care everywhere.     Ernesto Manzanares is a 32 year old male with a past medical history of duchenne's muscular dystrophy, s/p tracheostomy, ventilator and JT placment. Transferred from [facility] due to his advancing age.  Patient has history of chronic respiratory failure, ventilator dependent, osteoporsis, ingrown toenail, seasonal allergies and cardiomyopathy (7/19/2019 echocardiogram EF 45-50%).    Today, the patient was seen for:  Support, muscle spasms, neuropathic pain  "            Interval History:     Key Palliative Symptoms:  # Pain severity the last 12 hours: low  # Dyspnea severity the last 12 hours: none  # Nausea severity the last 12 hours: none  # Anxiety severity the last 12 hours: none  Suffers from chronic pain. Increased pain behind knees and to ankles first hour upon waking in AM. Sleeps with knees at 90 degrees angle to take pressure off his spine. Can increase to 8-10/10 for 1 hours then goes away.  Weakness: Chronic, muscular dystrophy.   Patient is on opioids: assessed and bowels ok/no needed changes to plan of care today.           Review of Systems:     Besides above, an additional system ROS was reviewed and is unremarkable.          Medications:     I have reviewed this patient's medication profile and PDMP.           Physical Exam:     There were no vitals taken for this visit.  General appearance: alert, appears stated age, cooperative and no distress  Head: Normocephalic, without obvious abnormality, atraumatic  Eyes: lids and lashes normal.  Nose: no discharge  Neck: tracheostomy present  Lungs: non-labored  Neurologic: alert, jovial, oriented x4.              Data Reviewed:     Pertinent Labs  Lab Results: personally reviewed.   No results found for: NA, CO2, BUN, CREATININE, GLUCOSE  No results found for: WBC, HGB, HCT, MCV, PLT  No results found for: AST, ALT, ALKPHOS, ALBUMIN      Radiology Results  No results found.       TTS: I have personally spent a total of 40 minutes today on unit in review of medical record and assessment of patient today, with more than 50% of this time spent in counseling, coordination of care, and discussion with patient re: symptom management, risks and benefits of management options, and development of plan of care as noted above.     TUYET Walker, CNS  Palliative Care  [phone]

## 2022-10-04 NOTE — PROGRESS NOTES
Ernesto is a 32 year old who is being evaluated via a billable video visit.      How would you like to obtain your AVS? MyChart  If the video visit is dropped, the invitation should be resent by: Send to e-mail at: [email or web address]  Will anyone else be joining your video visit? No        Video-Visit Details    Video Start Time: 1440    Type of service:  Video Visit    Video End Time:    Originating Location (pt. Location): Home    Distant Location (provider location):  [facility] [place]     Platform used for Video Visit: Joe     Pt called prior to video visit, see assessment. Further recommendations and orders per provider.

## 2022-10-07 ENCOUNTER — TELEPHONE (OUTPATIENT)
Dept: PALLIATIVE CARE | Facility: CLINIC | Age: 32
End: 2022-10-07

## 2022-10-07 NOTE — TELEPHONE ENCOUNTER
Call placed to pt's caregiver Leanne to discuss effectiveness of Robaxin 100 mg qam, heat and biofreeze with massage to see if it has helped with pt's bilateral leg pain and spasms he has for the first hour in the morning. Pt has picked up the prescription for Robaxin, however he hasn't started it yet. He plans to do so tomorrow or Sunday. He has found heat to be helpful. Will plan to check in again mid week next week to see if the Robaxin has been helpful and tolerated.    DWIGHT EsquivelN, RN  Palliative Care Nurse Clinician    [phone] (Direct)  [phone] (Main)  [phone] (Appointment Scheduling)

## 2022-10-13 ENCOUNTER — PATIENT OUTREACH (OUTPATIENT)
Dept: PALLIATIVE CARE | Facility: CLINIC | Age: 32
End: 2022-10-13

## 2022-10-13 NOTE — PROGRESS NOTES
"[facility]: Palliative Care                                                                                        Called patient to check in following apt last week and med changes recommended. Spoke with him - he tells me that he \"found other solutions.\"     After asking more questions he tells me that he did not do what was recommended with robaxin doses.    Instead, on nights he feels like he might wake up with more pain then usual he will take aleve before bed. This helps. He is also working with PT and feels like they are heading in the right direction and targeting the muscles that are causing the issues he has been having.      He continues previous robaxin dosing 3-4 times per day.     Signature:  Kat Laboy, DWIGHTN, RN, OCN  "

## 2022-10-15 ENCOUNTER — HEALTH MAINTENANCE LETTER (OUTPATIENT)
Age: 32
End: 2022-10-15

## 2022-11-02 ENCOUNTER — PATIENT OUTREACH (OUTPATIENT)
Dept: PALLIATIVE CARE | Facility: CLINIC | Age: 32
End: 2022-11-02

## 2022-11-02 DIAGNOSIS — Z93.4 JEJUNOSTOMY TUBE PRESENT (H): ICD-10-CM

## 2022-11-02 DIAGNOSIS — Z93.0 TRACHEOSTOMY PRESENT (H): ICD-10-CM

## 2022-11-02 DIAGNOSIS — G71.01 DMD (DUCHENNE MUSCULAR DYSTROPHY) (H): ICD-10-CM

## 2022-11-02 NOTE — PROGRESS NOTES
[facility]: Palliative Care                                                                                        Received voicemail from patient's home care nurse requesting refill of oxycodone.     Last refill: 9/6/2022  Last office visit: 10/4/2022  Scheduled for follow up 1/27/2023    Will route request to MD for review.     [place]ed [place].       Signature:  Kat Laboy, DWIGHTN, RN, OCN

## 2022-11-03 RX ORDER — OXYCODONE HCL 5 MG/5 ML
5 SOLUTION, ORAL ORAL EVERY 4 HOURS PRN
Qty: 40 ML | Refills: 0 | Status: SHIPPED | OUTPATIENT
Start: 2022-11-03 | End: 2022-12-22

## 2022-12-22 DIAGNOSIS — Z93.0 TRACHEOSTOMY PRESENT (H): ICD-10-CM

## 2022-12-22 DIAGNOSIS — G71.01 DMD (DUCHENNE MUSCULAR DYSTROPHY) (H): ICD-10-CM

## 2022-12-22 DIAGNOSIS — Z93.4 JEJUNOSTOMY TUBE PRESENT (H): ICD-10-CM

## 2022-12-22 RX ORDER — OXYCODONE HCL 5 MG/5 ML
5 SOLUTION, ORAL ORAL EVERY 4 HOURS PRN
Qty: 40 ML | Refills: 0 | Status: SHIPPED | OUTPATIENT
Start: 2022-12-22 | End: 2023-02-13

## 2022-12-22 NOTE — TELEPHONE ENCOUNTER
Received telephone call from patient requesting refill of oxycodone.     Last refill: 11/3/22  Last office visit: 10/4/23  Scheduled for follow up 1/27/23     Will route request to MD for review.     [place]ed [place].

## 2022-12-28 DIAGNOSIS — M79.18 MUSCULOSKELETAL PAIN: ICD-10-CM

## 2022-12-28 DIAGNOSIS — G71.01 DMD (DUCHENNE MUSCULAR DYSTROPHY) (H): ICD-10-CM

## 2022-12-29 NOTE — TELEPHONE ENCOUNTER
[facility]: Palliative Care                                                                                          Last date written and prescribing provider:  6/6/2022 60 caps with 3 refillls    Last office visit: 5/10/2022 Dr Delon Giraldo  Next office visit:  1/27/2023 Dr Delon Giraldo     reviewed with no concerns  Last sold per :  12/28/2022    Will route to Dr. Delon Giraldo to approve       Kim Yancey LPN  Palliative  Care Coordination  [phone]

## 2023-01-02 RX ORDER — GABAPENTIN 100 MG/1
CAPSULE ORAL
Qty: 60 CAPSULE | Refills: 3 | Status: SHIPPED | OUTPATIENT
Start: 2023-01-02 | End: 2023-05-19

## 2023-01-27 ENCOUNTER — VIRTUAL VISIT (OUTPATIENT)
Dept: ONCOLOGY | Facility: CLINIC | Age: 33
End: 2023-01-27
Attending: STUDENT IN AN ORGANIZED HEALTH CARE EDUCATION/TRAINING PROGRAM
Payer: COMMERCIAL

## 2023-01-27 DIAGNOSIS — G71.01 DMD (DUCHENNE MUSCULAR DYSTROPHY) (H): Primary | ICD-10-CM

## 2023-01-27 DIAGNOSIS — G43.109 MIGRAINE WITH AURA AND WITHOUT STATUS MIGRAINOSUS, NOT INTRACTABLE: ICD-10-CM

## 2023-01-27 DIAGNOSIS — Z79.891 ENCOUNTER FOR LONG-TERM USE OF OPIATE ANALGESIC: ICD-10-CM

## 2023-01-27 DIAGNOSIS — M79.2 NEUROPATHIC PAIN: ICD-10-CM

## 2023-01-27 DIAGNOSIS — M79.18 MUSCULOSKELETAL PAIN: ICD-10-CM

## 2023-01-27 DIAGNOSIS — R07.81 RIB PAIN: ICD-10-CM

## 2023-01-27 DIAGNOSIS — Z51.5 ENCOUNTER FOR PALLIATIVE CARE: ICD-10-CM

## 2023-01-27 DIAGNOSIS — R35.0 URINARY FREQUENCY: ICD-10-CM

## 2023-01-27 PROCEDURE — 99214 OFFICE O/P EST MOD 30 MIN: CPT | Mod: GT | Performed by: STUDENT IN AN ORGANIZED HEALTH CARE EDUCATION/TRAINING PROGRAM

## 2023-01-27 NOTE — PROGRESS NOTES
Ernesto is a 32 year old who is being evaluated via a billable video visit.  Currently in [place].    How would you like to obtain your AVS? MyChart  If the video visit is dropped, the invitation should be resent by: Send to e-mail at: [email or web address]  Will anyone else be joining your video visit? No     Idania Vega, AYANA        Palliative Care Progress Note    Patient Name: Ernesto Harding  Primary Provider: Arianne Weeks    Chief Complaint/Patient ID: Ernesto Harding is a 32 year old male with PMHx of duchenne's muscular dystrophy, s/p tracheostomy, ventilator and JT placment. Transferred from [facility] due to his advancing age.    Patient has history of chronic respiratory failure, ventilator dependent, osteoporsis, ingrown toenail, seasonal allergies and cardiomyopathy (7/19/2019 echocardiogram EF 45-50%).    5 mg oxycodone prn with trach changes and severe periodic episodes of pain from vertebral fractures.     Has 24/7 nursing.    Last Palliative care appointment: 10/4/22 with TIFFANIE Canales. Trial of increased dose of robaxin in AM to help with worsening spasms as well as biofreeze.     Reviewed: Yes    Interim History:  Ernesto Harding is a 32 year old male who is seen today for follow up with Palliative Care via billable video visit.      At last visit, was having increased pain behind knees and to ankles first hour upon waking in AM.  Tried increasing Robaxin dose in the morning, but it did not make a difference.  Since then that pain has resolved, though he is not sure why.  He has been drinking more fluids.  He also did have a 1-1/2-month break from physical therapy and wonders if that could have helped.  He resumed physical therapy this week and let his therapist know what happened so that they can keep an eye out if symptoms return.    Gabapentin 100mg in the morning and 900mg in the evening continues to work well for him.    Last J-tube change in December and trach  change last week.  Continues using a couple doses of oxycodone around his changes for increased pain/discomfort.  Otherwise has not really had to use much additional oxycodone.    No migraines in many months.  Has not had to use the Imitrex.    Has been having increased urinary frequency for the past few weeks.  Did not start when he increased his hydration so does not think it is related.  So far urine studies have been negative.  Is seeing urology.     Social History:  No changes.  Has in-home nursing care.  Social History     Tobacco Use     Smoking status: Never     Smokeless tobacco: Never     Family History- Reviewed in Epic.    Allergies   Allergen Reactions     Duloxetine Nausea and Vomiting     Pollen Extract        Advanced Care Planning: Has completed HCD.  His mother is his HCA. POLST on file stating DNAR/Full Treatment.    Medications- Reviewed in Epic.    Past Medical History- Reviewed in [employer].    Past Surgical History- Reviewed in Epic.    Review of Systems:   ROS: 10 point ROS neg other than the symptoms noted above in the HPI.      Physical Exam:   Constitutional: Alert young man sitting in a hospital bed in no acute distress.   ENT: Tracheostomy/ventilator.  Speech is slow and nearly normal through the trach.    Respiratory: Respirations unlabored, no cough, speaking full sentences.   MSK: Thin with advanced muscle loss.  Skin:  Skin with no obvious lesions. G-tube site is unremarkable to visual inspection on his belly.   Neuro: Face is symmetric.    Psych: Affect is full, pleasant, interactive.          Key Data Reviewed:  LABS: 5/18/22- Cr 0.12, GFR >60, Albumin 4.2, LFTs essentially wnl.     Impression & Recommendations & Counseling:  Ernesto Harding is a 32 year old male with history of Duchenne's Muscular Dystrophy complicated by chronic back, neck, chest wall and upper extremity pain as well as pain during tracheostomy changes.    Pain, MSK and Neuropathic  Muscle spasms  Hx vertebral  fractures -  Stable.  Had a flare of pain in October, however this is resolved.  - Continue oxycodone 5 mg 30 minutes before trach and J-tube change, as well as Q4H PRN for severe pain.    Using very sparingly.  -Robaxin 500mg TID since higher dose in the morning did not make a difference for him.  -Gabapentin 100mg in AM and 900mg in evening.    Urinary frequency - Working this up with PCP and urology.  Negative UA, though there was some protein in his urine.  Will likely have a bladder ultrasound soon.  I suggested asking PCP about Azo and if this might help treat some of his symptoms.    Migraines - Have not recurred.  Has nasal Imitrex if needed.      Follow up: 3 to 4 months    Video-Visit Details  Video Start Time:  3:14 PM  Video End Time:  3:31 PM    Originating Location (pt. Location): Home     Distant Location (provider location):  Offsite- Personal Home     Platform used for Video Visit: Well     Total time spent on day of encounter is 38 mins, including reviewing record, review of above studies, above visit with patient, symptomatic discussion of pain, migraines, and urinary symptoms, including medication adjustments/prescription management, and documentation.     Vicky Colvin,   Palliative Medicine   Pager [phone], AMCOM ID [id number]    Some chart documentation performed using Dragon Voice recognition Software. Although reviewed after completion, some words and grammatical errors may remain.

## 2023-01-27 NOTE — LETTER
1/27/2023         RE: Ernesto Harding  [address]  [place] [address]        Dear Colleague,    Thank you for referring your patient, Ernesto Harding, to the [facility]. Please see a copy of my visit note below.    Ernesto is a 32 year old who is being evaluated via a billable video visit.  [place] in [place].    How would you like to obtain your AVS? MyChart  If the video visit is dropped, the invitation should be resent by: Send to e-mail at: [email or web address]  Will anyone else be joining your video visit? No     AYNAA Langley        Palliative Care Progress Note    Patient Name: Ernesto Harding  Primary Provider: Arianne Weeks    Chief Complaint/Patient ID: Ernesto Harding is a 32 year old male with PMHx of duchenne's muscular dystrophy, s/p tracheostomy, ventilator and JT placment. Transferred from [facility] due to his advancing age.    Patient has history of chronic respiratory failure, ventilator dependent, osteoporsis, ingrown toenail, seasonal allergies and cardiomyopathy (7/19/2019 echocardiogram EF 45-50%).    5 mg oxycodone prn with trach changes and severe periodic episodes of pain from vertebral fractures.     Has 24/7 nursing.    Last Palliative care appointment: 10/4/22 with TIFFANIE Canales. Trial of increased dose of robaxin in AM to help with worsening spasms as well as biofreeze.     Reviewed: Yes    Interim History:  Ernesto Harding is a 32 year old male who is seen today for follow up with Palliative Care via billable video visit.      At last visit, was having increased pain behind knees and to ankles first hour upon waking in AM.  Tried increasing Robaxin dose in the morning, but it did not make a difference.  Since then that pain has resolved, though he is not sure why.  He has been drinking more fluids.  He also did have a 1-1/2-month break from physical therapy and wonders if that could have helped.  He resumed physical  therapy this week and let his therapist know what happened so that they can keep an eye out if symptoms return.    Gabapentin 100mg in the morning and 900mg in the evening continues to work well for him.    Last J-tube change in December and trach change last week.  Continues using a couple doses of oxycodone around his changes for increased pain/discomfort.  Otherwise has not really had to use much additional oxycodone.    No migraines in many months.  Has not had to use the Imitrex.    Has been having increased urinary frequency for the past few weeks.  Did not start when he increased his hydration so does not think it is related.  So far urine studies have been negative.  Is seeing urology.     Social History:  No changes.  Has in-home nursing care.  Social History     Tobacco Use     Smoking status: Never     Smokeless tobacco: Never     Family History- Reviewed in Epic.    Allergies   Allergen Reactions     Duloxetine Nausea and Vomiting     Pollen Extract        Advanced Care Planning: Has completed [facility].  His mother is his HCA. POLST on file stating DNAR/Full Treatment.    Medications- Reviewed in Epic.    Past Medical History- Reviewed in [employer].    Past Surgical History- Reviewed in Epic.    Review of Systems:   ROS: 10 point ROS neg other than the symptoms noted above in the HPI.      Physical Exam:   Constitutional: Alert young man sitting in a hospital bed in no acute distress.   ENT: Tracheostomy/ventilator.  Speech is slow and nearly normal through the trach.    Respiratory: Respirations unlabored, no cough, speaking full sentences.   MSK: Thin with advanced muscle loss.  Skin:  Skin with no obvious lesions. G-tube site is unremarkable to visual inspection on his belly.   Neuro: Face is symmetric.    Psych: Affect is full, pleasant, interactive.          Key Data Reviewed:  LABS: 5/18/22- Cr 0.12, GFR >60, Albumin 4.2, LFTs essentially wnl.     Impression & Recommendations & Counseling:  Ernesto Harding is  a 32 year old male with history of Duchenne's Muscular Dystrophy complicated by chronic back, neck, chest wall and upper extremity pain as well as pain during tracheostomy changes.    Pain, MSK and Neuropathic  Muscle spasms  Hx vertebral fractures -  Stable.  Had a flare of pain in October, however this is resolved.  - Continue oxycodone 5 mg 30 minutes before trach and J-tube change, as well as Q4H PRN for severe pain.    Using very sparingly.  -Robaxin 500mg TID since higher dose in the morning did not make a difference for him.  -Gabapentin 100mg in AM and 900mg in evening.    Urinary frequency - Working this up with PCP and urology.  Negative UA, though there was some protein in his urine.  Will likely have a bladder ultrasound soon.  I suggested asking PCP about Azo and if this might help treat some of his symptoms.    Migraines - Have not recurred.  Has nasal Imitrex if needed.      Follow up: 3 to 4 months    Video-Visit Details  Video Start Time:  3:14 PM  Video End Time:  3:31 PM    Originating Location (pt. Location): Home     Distant Location (provider location):  Offsite- Personal Home     Platform used for Video Visit: [employer]     Total time spent on day of encounter is 38 mins, including reviewing record, review of above studies, above visit with patient, symptomatic discussion of pain, migraines, and urinary symptoms, including medication adjustments/prescription management, and documentation.     Vicky Colvin DO  Palliative Medicine   Pager [phone], AMCOM ID [id number]    Some chart documentation performed using Dragon Voice recognition Software. Although reviewed after completion, some words and grammatical errors may remain.        Again, thank you for allowing me to participate in the care of your patient.        Sincerely,        Vicky Colvin DO

## 2023-01-27 NOTE — LETTER
1/27/2023         RE: Ernesto Harding  [address]  [place] [facility] [place] [facility] [place] [address]        Dear Colleague,    Thank you for referring your patient, Ernesto Harding, to the [facility]. Please see a copy of my visit note below.    Ernesto is a 32 year old who is being evaluated via a billable video visit.  [place] in [place].    How would you like to obtain your AVS? MyChart  If the video visit is dropped, the invitation should be resent by: Send to e-mail at: [email or web address]  Will anyone else be joining your video visit? No     AYANA Langley        Palliative Care Progress Note    Patient Name: Ernesto Harding  Primary Provider: Arianne Weeks    Chief Complaint/Patient ID: Ernesto Harding is a 32 year old male with PMHx of duchenne's muscular dystrophy, s/p tracheostomy, ventilator and JT placment. Transferred from [facility] due to his advancing age.    Patient has history of chronic respiratory failure, ventilator dependent, osteoporsis, ingrown toenail, seasonal allergies and cardiomyopathy (7/19/2019 echocardiogram EF 45-50%).    5 mg oxycodone prn with trach changes and severe periodic episodes of pain from vertebral fractures.     Has 24/7 nursing.    Last Palliative care appointment: 10/4/22 with TIFFANIE Canales. Trial of increased dose of robaxin in AM to help with worsening spasms as well as biofreeze.     Reviewed: Yes    Interim History:  Ernesto Harding is a 32 year old male who is seen today for follow up with Palliative Care via billable video visit.      At last visit, was having increased pain behind knees and to ankles first hour upon waking in AM.  Tried increasing Robaxin dose in the morning, but it did not make a difference.  Since then that pain has resolved, though he is not sure why.  He has been drinking more fluids.  He also did have a 1-1/2-month break from physical therapy and wonders if that could have helped.  He resumed physical  therapy this week and let his therapist know what happened so that they can keep an eye out if symptoms return.    Gabapentin 100mg in the morning and 900mg in the evening continues to work well for him.    Last J-tube change in December and trach change last week.  Continues using a couple doses of oxycodone around his changes for increased pain/discomfort.  Otherwise has not really had to use much additional oxycodone.    No migraines in many months.  Has not had to use the Imitrex.    Has been having increased urinary frequency for the past few weeks.  Did not start when he increased his hydration so does not think it is related.  So far urine studies have been negative.  Is seeing urology.     Social History:  No changes.  Has in-home nursing care.  Social History     Tobacco Use     Smoking status: Never     Smokeless tobacco: Never     Family History- Reviewed in Epic.    Allergies   Allergen Reactions     Duloxetine Nausea and Vomiting     Pollen Extract        Advanced Care Planning: Has completed [facility].  His mother is his HCA. POLST on file stating DNAR/Full Treatment.    Medications- Reviewed in Epic.    Past Medical History- Reviewed in [employer].    Past Surgical History- Reviewed in Epic.    Review of Systems:   ROS: 10 point ROS neg other than the symptoms noted above in the HPI.      Physical Exam:   Constitutional: Alert young man sitting in a hospital bed in no acute distress.   ENT: Tracheostomy/ventilator.  Speech is slow and nearly normal through the trach.    Respiratory: Respirations unlabored, no cough, speaking full sentences.   MSK: Thin with advanced muscle loss.  Skin:  Skin with no obvious lesions. G-tube site is unremarkable to visual inspection on his belly.   Neuro: Face is symmetric.    Psych: Affect is full, pleasant, interactive.          Key Data Reviewed:  LABS: 5/18/22- Cr 0.12, GFR >60, Albumin 4.2, LFTs essentially wnl.     Impression & Recommendations & Counseling:  Ernesto Harding is  a 32 year old male with history of Duchenne's Muscular Dystrophy complicated by chronic back, neck, chest wall and upper extremity pain as well as pain during tracheostomy changes.    Pain, MSK and Neuropathic  Muscle spasms  Hx vertebral fractures -  Stable.  Had a flare of pain in October, however this is resolved.  - Continue oxycodone 5 mg 30 minutes before trach and J-tube change, as well as Q4H PRN for severe pain.    Using very sparingly.  -Robaxin 500mg TID since higher dose in the morning did not make a difference for him.  -Gabapentin 100mg in AM and 900mg in evening.    Urinary frequency - Working this up with PCP and urology.  Negative UA, though there was some protein in his urine.  Will likely have a bladder ultrasound soon.  I suggested asking PCP about Azo and if this might help treat some of his symptoms.    Migraines - Have not recurred.  Has nasal Imitrex if needed.      Follow up: 3 to 4 months    Video-Visit Details  Video Start Time:  3:14 PM  Video End Time:  3:31 PM    Originating Location (pt. Location): Home     Distant Location (provider location):  Offsite- Personal Home     Platform used for Video Visit: [employer]     Total time spent on day of encounter is 38 mins, including reviewing record, review of above studies, above visit with patient, symptomatic discussion of pain, migraines, and urinary symptoms, including medication adjustments/prescription management, and documentation.     Vicky Colvin DO  Palliative Medicine   Pager [phone], AMCOM ID [id number]    Some chart documentation performed using Dragon Voice recognition Software. Although reviewed after completion, some words and grammatical errors may remain.        Again, thank you for allowing me to participate in the care of your patient.        Sincerely,        Vicky Colvin DO

## 2023-01-27 NOTE — NURSING NOTE
Patient declined individual allergy and medication review by support staff because patient told nurse Leanne that he reviewed medications and allergies during e check in and everything was up to date.    Idania Vega, VF

## 2023-01-27 NOTE — PATIENT INSTRUCTIONS
Recommendations:  -Continue current medications the same.  Suggested asking your primary doctor if Azo may be helpful for your urinary symptoms.    Follow up: 3 to 4 months      Reasons to Call    If you are having worsening/uncontrolled symptoms we want you to call!    You or your other physicians make any changes to medications we have prescribed.  -Please call for refills 4-5 days before you will run out of medication.    Important Phone Numbers    [facility] - Shyanne Lamas. Palliative Care RN - [phone]    [facility] - Kat Laboy. Palliative Care RN - [phone]  *For Refills, scheduling, and general questions - [phone]  *After hours or on weekends. Will connect you with on call MD. [phone]

## 2023-01-27 NOTE — NURSING NOTE
Ernesto gave me verbal permission to speak with his nurse Leanne about his medical information to get him checked in and roomed for today's visit.    Idania Vega, VF

## 2023-02-13 ENCOUNTER — PATIENT OUTREACH (OUTPATIENT)
Dept: PALLIATIVE CARE | Facility: CLINIC | Age: 33
End: 2023-02-13
Payer: COMMERCIAL

## 2023-02-13 DIAGNOSIS — Z93.4 JEJUNOSTOMY TUBE PRESENT (H): ICD-10-CM

## 2023-02-13 DIAGNOSIS — Z93.0 TRACHEOSTOMY PRESENT (H): ICD-10-CM

## 2023-02-13 DIAGNOSIS — G71.01 DMD (DUCHENNE MUSCULAR DYSTROPHY) (H): ICD-10-CM

## 2023-02-13 RX ORDER — OXYCODONE HCL 5 MG/5 ML
5 SOLUTION, ORAL ORAL EVERY 4 HOURS PRN
Qty: 40 ML | Refills: 0 | Status: SHIPPED | OUTPATIENT
Start: 2023-02-13 | End: 2023-04-13

## 2023-02-20 DIAGNOSIS — R07.81 RIB PAIN: ICD-10-CM

## 2023-02-20 DIAGNOSIS — M79.2 NEUROPATHIC PAIN: ICD-10-CM

## 2023-02-21 RX ORDER — GABAPENTIN 300 MG/1
900 CAPSULE ORAL AT BEDTIME
Qty: 90 CAPSULE | Refills: 11 | Status: SHIPPED | OUTPATIENT
Start: 2023-03-20 | End: 2023-05-19

## 2023-02-21 NOTE — TELEPHONE ENCOUNTER
Received "[employer]hart message from pharmacy requesting refill of gabapentin. Requesting early to have on file for next fill.    Last refill: 2/20/23  Last office visit: 1/27/23  Scheduled for follow up 5/17/23     Will route request to MD for review.     Reviewed [place]  Report.

## 2023-04-13 ENCOUNTER — PATIENT OUTREACH (OUTPATIENT)
Dept: PALLIATIVE CARE | Facility: CLINIC | Age: 33
End: 2023-04-13
Payer: COMMERCIAL

## 2023-04-13 DIAGNOSIS — G71.01 DMD (DUCHENNE MUSCULAR DYSTROPHY) (H): ICD-10-CM

## 2023-04-13 DIAGNOSIS — R07.81 RIB PAIN ON LEFT SIDE: ICD-10-CM

## 2023-04-13 DIAGNOSIS — Z93.0 TRACHEOSTOMY PRESENT (H): ICD-10-CM

## 2023-04-13 DIAGNOSIS — M79.18 MUSCULOSKELETAL PAIN: ICD-10-CM

## 2023-04-13 DIAGNOSIS — M62.838 MUSCLE SPASM: ICD-10-CM

## 2023-04-13 DIAGNOSIS — Z93.4 JEJUNOSTOMY TUBE PRESENT (H): ICD-10-CM

## 2023-04-13 RX ORDER — OXYCODONE HCL 5 MG/5 ML
5 SOLUTION, ORAL ORAL EVERY 4 HOURS PRN
Qty: 40 ML | Refills: 0 | Status: SHIPPED | OUTPATIENT
Start: 2023-04-13 | End: 2023-05-19

## 2023-04-13 RX ORDER — METHOCARBAMOL 500 MG/1
TABLET, FILM COATED ORAL
Qty: 120 TABLET | Refills: 13 | Status: SHIPPED | OUTPATIENT
Start: 2023-04-13 | End: 2023-05-19

## 2023-04-13 NOTE — TELEPHONE ENCOUNTER
[facility]: Palliative Care                                                                                        Received mychart message from pharmacy requesting refill of methocarbamol.     Last refill: 8/5/2023  Last office visit: 1/27/2023  Scheduled for follow up 5/17/2023    Will route request to MD for review.     Signature:  Kat Laboy, DWIGHTN, RN, OCN

## 2023-04-13 NOTE — PROGRESS NOTES
[facility]: Palliative Care                                                                                        Received voicemail from home care nurse requesting refill of oxycodone.     Last refill: 2/13/2023  Last office visit: 1/27/2023  Scheduled for follow up 5/17/2023    Will route request to MD for review.     Reviewed [place]  Report.       Signature:  Kat Laboy, DWIGHTN, RN, OCN

## 2023-05-17 ENCOUNTER — TELEPHONE (OUTPATIENT)
Dept: ONCOLOGY | Facility: CLINIC | Age: 33
End: 2023-05-17

## 2023-05-17 ENCOUNTER — VIRTUAL VISIT (OUTPATIENT)
Dept: ONCOLOGY | Facility: CLINIC | Age: 33
End: 2023-05-17
Attending: STUDENT IN AN ORGANIZED HEALTH CARE EDUCATION/TRAINING PROGRAM
Payer: MEDICAID

## 2023-05-17 DIAGNOSIS — M62.838 MUSCLE SPASM: ICD-10-CM

## 2023-05-17 DIAGNOSIS — Z79.891 ENCOUNTER FOR LONG-TERM USE OF OPIATE ANALGESIC: ICD-10-CM

## 2023-05-17 DIAGNOSIS — Z51.5 ENCOUNTER FOR PALLIATIVE CARE: ICD-10-CM

## 2023-05-17 DIAGNOSIS — G71.01 DMD (DUCHENNE MUSCULAR DYSTROPHY) (H): Primary | ICD-10-CM

## 2023-05-17 DIAGNOSIS — M79.2 NEUROPATHIC PAIN: ICD-10-CM

## 2023-05-17 DIAGNOSIS — M79.18 MUSCULOSKELETAL PAIN: ICD-10-CM

## 2023-05-17 DIAGNOSIS — Z93.0 TRACHEOSTOMY PRESENT (H): ICD-10-CM

## 2023-05-17 PROCEDURE — 99214 OFFICE O/P EST MOD 30 MIN: CPT | Mod: VID | Performed by: STUDENT IN AN ORGANIZED HEALTH CARE EDUCATION/TRAINING PROGRAM

## 2023-05-17 NOTE — PROGRESS NOTES
Patient confirms medications and allergies are accurate via patients echeck in completion, and or denies any changes since last reviewed/verified.     Patient declined individual allergy and medication review by support staff because Ernesto reviewed upon echeckin online yesterday.    Marilu Jenkins, Virtual Facilitator      Palliative Care Progress Note    Patient Name: Ernesto Harding  Primary Provider: Arianne Weeks    Chief Complaint/Patient ID: Ernseto Harding is a 32 year old male with PMHx of duchenne's muscular dystrophy, s/p tracheostomy, ventilator and JT placment. Transferred from [facility] due to his advancing age.     Patient has history of chronic respiratory failure, ventilator dependent, osteoporsis, ingrown toenail, seasonal allergies and cardiomyopathy (7/19/2019 echocardiogram EF 45-50%).     5 mg oxycodone prn with trach changes and severe periodic episodes of pain from vertebral fractures.     Has 24/7 nursing.    Last Palliative care appointment: 1/27/23 with me.      Reviewed: Yes    Interim History:  Ernesto Harding is a 32 year old male who is seen today for follow up with Palliative Care via billable video visit.      Gabapentin 100mg in the morning and 900mg in the evening continues to work well for him.     Tried not using the oxycodone prior to his most recent J-tube change and states it went fine.  He thinks he is going to try to go without it in the future.  We will continue to use the oxycodone for trach changes, and states it is working well.    New pain in the sides of his neck been present for a little under 2 months.  States he tried working with his physical therapist on the area, however it made things worse.  He thinks it is related to his shoulders loosening up and pulling down on his neck.  He never has pain when he wakes up, however it starts to get worse by late afternoon.  Takes Tylenol 1000 mg when it happens, which she states does help but he has been  "hesitant to take something every day.  His mom has suggested applying ice or heat, however he says he has \"been resistant to this\".    Social History:  Living Situation: Lives with his Dad and a cousin. Also has PCA and 24H nursing care. His Mom lives about a mile away.  Actual/Potential Caregiver(s): See above.  Support System: Multiple friends and family members  Hobbies: Reading, sport, TV, Crescencio  Social History     Tobacco Use     Smoking status: Never     Smokeless tobacco: Never     Family History- Reviewed in Epic.    Allergies   Allergen Reactions     Duloxetine Nausea and Vomiting     Pollen Extract      Advanced Care Planning: Has completed HCD.  His mother is his HCA. POLST on file stating DNAR/Full Treatment.    Medications- Reviewed in Epic.    Past Medical History- Reviewed in [employer].    Past Surgical History- Reviewed in Epic.    Review of Systems:   ROS: 10 point ROS neg other than the symptoms noted above in the HPI.      Physical Exam:   Constitutional: Alert young man sitting in a hospital bed in no acute distress.   ENT: Tracheostomy/ventilator.  Speech is slow and nearly normal through the trach.    Respiratory: Respirations unlabored, no cough, speaking full sentences.   MSK: Thin with advanced muscle loss.  Skin:  Skin with no obvious lesions.    Neuro: Face is symmetric.    Psych: Affect is full, pleasant, interactive.          Key Data Reviewed:  LABS: 1/16/23- Cr 0.1, GFR >60.    Impression & Recommendations & Counseling:  Ernesto Harding is a 32 year old male with history of Duchenne's Muscular Dystrophy complicated by chronic back, neck, chest wall and upper extremity pain as well as pain during tracheostomy changes.    Pain, MSK and Neuropathic  Muscle spasms  Hx vertebral fractures -  Stable for the most part.  He does have a new pain in his neck, which seems to be involving his bilateral scalenes.   Does respond to Tylenol.  - Continue oxycodone 5 mg 30 minutes before trach changes.  He " did okay with his last J-tube change, and is planning to not use the oxycodone if possible going forward for these.  Additionally okay to use a 5 mg dose Q4H PRN for severe pain.    Using very sparingly.  -Suggested adding in a fourth dose of Robaxin 500 mg throughout the day to see if this helps with the neck pain.  -Encouraged him that it is okay for him to use 1000 mg of Tylenol once every day if it does help with the pain.  -Try ice/heat on his neck  -Gabapentin 100mg in AM and 900mg in evening.    They found out yesterday that the pharmacy they were using closed, so they are in the process of looking for a new pharmacy.  They will let us know where new prescriptions need to be set going forward.    Follow up: 4 months    Video-Visit Details  Video Start Time:  1:54 PM  Video End Time:  2:07 PM    Originating Location (pt. Location): Home     Distant Location (provider location):  Offsite- Personal Home      Platform used for Video Visit: Joe     Total time spent on day of encounter is 26 mins, including reviewing record, review of above studies, above visit with patient, symptomatic discussion of different types of pain, including medication adjustments/prescription management, and documentation.     Vicky Colvin,   Palliative Medicine   AMCOM ID [id number]    Some chart documentation performed using Dragon Voice recognition Software. Although reviewed after completion, some words and grammatical errors may remain.

## 2023-05-17 NOTE — LETTER
5/17/2023         RE: Ernesto Harding  [address]  [facility] [address]        Dear Colleague,    Thank you for referring your patient, Ernesto Harding, to the [facility]. Please see a copy of my visit note below.      Patient confirms medications and allergies are accurate via patients echeck in completion, and or denies any changes since last reviewed/verified.     Patient declined individual allergy and medication review by support staff because Ernesto reviewed upon echeckin online yesterday.    Marilu Jenkins, Virtual Facilitator      Palliative Care Progress Note    Patient Name: Ernesto Harding  Primary Provider: Arianne Weeks    Chief Complaint/Patient ID: Ernesto Harding is a 32 year old male with PMHx of duchenne's muscular dystrophy, s/p tracheostomy, ventilator and JT placment. Transferred from [facility] due to his advancing age.     Patient has history of chronic respiratory failure, ventilator dependent, osteoporsis, ingrown toenail, seasonal allergies and cardiomyopathy (7/19/2019 echocardiogram EF 45-50%).     5 mg oxycodone prn with trach changes and severe periodic episodes of pain from vertebral fractures.     Has 24/7 nursing.    Last Palliative care appointment: 1/27/23 with me.      Reviewed: Yes    Interim History:  Ernesto Harding is a 32 year old male who is seen today for follow up with Palliative Care via billable video visit.      Gabapentin 100mg in the morning and 900mg in the evening continues to work well for him.     Tried not using the oxycodone prior to his most recent J-tube change and states it went fine.  He thinks he is going to try to go without it in the future.  We will continue to use the oxycodone for trach changes, and states it is working well.    New pain in the sides of his neck been present for a little under 2 months.  States he tried working with his physical therapist on the area, however it made things worse.  He  "thinks it is related to his shoulders loosening up and pulling down on his neck.  He never has pain when he wakes up, however it starts to get worse by late afternoon.  Takes Tylenol 1000 mg when it happens, which she states does help but he has been hesitant to take something every day.  His mom has suggested applying ice or heat, however he says he has \"been resistant to this\".    Social History:  Living Situation: Lives with his Dad and a cousin. Also has PCA and 24H nursing care. His Mom lives about a mile away.  Actual/Potential Caregiver(s): See above.  Support System: Multiple friends and family members  Hobbies: Reading, sport, TV, Crescencio  Social History     Tobacco Use     Smoking status: Never     Smokeless tobacco: Never     Family History- Reviewed in Epic.    Allergies   Allergen Reactions     Duloxetine Nausea and Vomiting     Pollen Extract      Advanced Care Planning: Has completed HCD.  His mother is his HCA. POLST on file stating DNAR/Full Treatment.    Medications- Reviewed in Epic.    Past Medical History- Reviewed in [employer].    Past Surgical History- Reviewed in Epic.    Review of Systems:   ROS: 10 point ROS neg other than the symptoms noted above in the HPI.      Physical Exam:   Constitutional: Alert young man sitting in a hospital bed in no acute distress.   ENT: Tracheostomy/ventilator.  Speech is slow and nearly normal through the trach.    Respiratory: Respirations unlabored, no cough, speaking full sentences.   MSK: Thin with advanced muscle loss.  Skin:  Skin with no obvious lesions.    Neuro: Face is symmetric.    Psych: Affect is full, pleasant, interactive.          Key Data Reviewed:  LABS: 1/16/23- Cr 0.1, GFR >60.    Impression & Recommendations & Counseling:  Ernesto Harding is a 32 year old male with history of Duchenne's Muscular Dystrophy complicated by chronic back, neck, chest wall and upper extremity pain as well as pain during tracheostomy changes.    Pain, MSK and " Neuropathic  Muscle spasms  Hx vertebral fractures -  Stable for the most part.  He does have a new pain in his neck, which seems to be involving his bilateral scalenes.   Does respond to Tylenol.  - Continue oxycodone 5 mg 30 minutes before trach changes.  He did okay with his last J-tube change, and is planning to not use the oxycodone if possible going forward for these.  Additionally okay to use a 5 mg dose Q4H PRN for severe pain.    Using very sparingly.  -Suggested adding in a fourth dose of Robaxin 500 mg throughout the day to see if this helps with the neck pain.  -Encouraged him that it is okay for him to use 1000 mg of Tylenol once every day if it does help with the pain.  -Try ice/heat on his neck  -Gabapentin 100mg in AM and 900mg in evening.    They found out yesterday that the pharmacy they were using closed, so they are in the process of looking for a new pharmacy.  They will let us know where new prescriptions need to be set going forward.    Follow up: 4 months    Video-Visit Details  Video Start Time:  1:54 PM  Video End Time:  2:07 PM    Originating Location (pt. Location): Home     Distant Location (provider location):  Offsite- Personal Home      Platform used for Video Visit: Joe     Total time spent on day of encounter is 26 mins, including reviewing record, review of above studies, above visit with patient, symptomatic discussion of different types of pain, including medication adjustments/prescription management, and documentation.     Vicky Colvin DO  Palliative Medicine   AMCOM ID [id number]    Some chart documentation performed using Dragon Voice recognition Software. Although reviewed after completion, some words and grammatical errors may remain.        Again, thank you for allowing me to participate in the care of your patient.        Sincerely,        Vicky Colvin DO

## 2023-05-17 NOTE — TELEPHONE ENCOUNTER
Left Voicemail (1st Attempt) for the patient to call back and schedule the following:    Appointment type: Virtual   Provider: STEVE Colvin  Return date: 09/12/23 4M   Specialty phone number: [phone]  Additional appointment(s) needed: follow up   Additonal Notes: none

## 2023-05-17 NOTE — NURSING NOTE
Is the patient currently in the state of [place]? YES    Visit mode:VIDEO    If the visit is dropped, the patient can be reconnected by: VIDEO VISIT: Text to cell phone: [phone]    Will anyone else be joining the visit? NO      How would you like to obtain your AVS? MyChart    Are changes needed to the allergy or medication list? NO    Reason for visit: Video Visit (Follow up )

## 2023-05-17 NOTE — PATIENT INSTRUCTIONS
Recommendations:  -Okay to take 1000 mg of Tylenol every day if you need to.  -Suggest also incorporating a fourth dose of the Robaxin throughout the day to see if this helps with the neck pain.  -Try applying some ice or heat as well.    Follow up: 4 months      Reasons to Call    If you are having worsening/uncontrolled symptoms we want you to call!    You or your other physicians make any changes to medications we have prescribed.  -Please call for refills 4-5 days before you will run out of medication.    Important Phone Numbers, including: Refills, scheduling, and general questions     Palliative Care RN: Shyanne Lamas - [phone]  *After hours or on weekends. Will connect you with on call MD. [phone]

## 2023-05-17 NOTE — LETTER
5/17/2023         RE: Ernesto Harding  [address]  [facility] [address]        Dear Colleague,    Thank you for referring your patient, Ernesto Harding, to the [facility]. Please see a copy of my visit note below.      Patient confirms medications and allergies are accurate via patients echeck in completion, and or denies any changes since last reviewed/verified.     Patient declined individual allergy and medication review by support staff because Ernesto reviewed upon echeckin online yesterday.    Marilu Jenkins, Virtual Facilitator      Palliative Care Progress Note    Patient Name: Ernesto Harding  Primary Provider: Arianne Weeks    Chief Complaint/Patient ID: Ernesto Harding is a 32 year old male with PMHx of duchenne's muscular dystrophy, s/p tracheostomy, ventilator and JT placment. Transferred from [facility] due to his advancing age.     Patient has history of chronic respiratory failure, ventilator dependent, osteoporsis, ingrown toenail, seasonal allergies and cardiomyopathy (7/19/2019 echocardiogram EF 45-50%).     5 mg oxycodone prn with trach changes and severe periodic episodes of pain from vertebral fractures.     Has 24/7 nursing.    Last Palliative care appointment: 1/27/23 with me.      Reviewed: Yes    Interim History:  Ernesto Harding is a 32 year old male who is seen today for follow up with Palliative Care via billable video visit.      Gabapentin 100mg in the morning and 900mg in the evening continues to work well for him.     Tried not using the oxycodone prior to his most recent J-tube change and states it went fine.  He thinks he is going to try to go without it in the future.  We will continue to use the oxycodone for trach changes, and states it is working well.    New pain in the sides of his neck been present for a little under 2 months.  States he tried working with his physical therapist on the area, however it made things worse.  He  "thinks it is related to his shoulders loosening up and pulling down on his neck.  He never has pain when he wakes up, however it starts to get worse by late afternoon.  Takes Tylenol 1000 mg when it happens, which she states does help but he has been hesitant to take something every day.  His mom has suggested applying ice or heat, however he says he has \"been resistant to this\".    Social History:  Living Situation: Lives with his Dad and a cousin. Also has PCA and 24H nursing care. His Mom lives about a mile away.  Actual/Potential Caregiver(s): See above.  Support System: Multiple friends and family members  Hobbies: Reading, sport, TV, Crescencio  Social History     Tobacco Use     Smoking status: Never     Smokeless tobacco: Never     Family History- Reviewed in Epic.    Allergies   Allergen Reactions     Duloxetine Nausea and Vomiting     Pollen Extract      Advanced Care Planning: Has completed HCD.  His mother is his HCA. POLST on file stating DNAR/Full Treatment.    Medications- Reviewed in Epic.    Past Medical History- Reviewed in [employer].    Past Surgical History- Reviewed in Epic.    Review of Systems:   ROS: 10 point ROS neg other than the symptoms noted above in the HPI.      Physical Exam:   Constitutional: Alert young man sitting in a hospital bed in no acute distress.   ENT: Tracheostomy/ventilator.  Speech is slow and nearly normal through the trach.    Respiratory: Respirations unlabored, no cough, speaking full sentences.   MSK: Thin with advanced muscle loss.  Skin:  Skin with no obvious lesions.    Neuro: Face is symmetric.    Psych: Affect is full, pleasant, interactive.          Key Data Reviewed:  LABS: 1/16/23- Cr 0.1, GFR >60.    Impression & Recommendations & Counseling:  Ernesto Harding is a 32 year old male with history of Duchenne's Muscular Dystrophy complicated by chronic back, neck, chest wall and upper extremity pain as well as pain during tracheostomy changes.    Pain, MSK and " Neuropathic  Muscle spasms  Hx vertebral fractures -  Stable for the most part.  He does have a new pain in his neck, which seems to be involving his bilateral scalenes.   Does respond to Tylenol.  - Continue oxycodone 5 mg 30 minutes before trach changes.  He did okay with his last J-tube change, and is planning to not use the oxycodone if possible going forward for these.  Additionally okay to use a 5 mg dose Q4H PRN for severe pain.    Using very sparingly.  -Suggested adding in a fourth dose of Robaxin 500 mg throughout the day to see if this helps with the neck pain.  -Encouraged him that it is okay for him to use 1000 mg of Tylenol once every day if it does help with the pain.  -Try ice/heat on his neck  -Gabapentin 100mg in AM and 900mg in evening.    They found out yesterday that the pharmacy they were using closed, so they are in the process of looking for a new pharmacy.  They will let us know where new prescriptions need to be set going forward.    Follow up: 4 months    Video-Visit Details  Video Start Time:  1:54 PM  Video End Time:  2:07 PM    Originating Location (pt. Location): Home     Distant Location (provider location):  Offsite- Personal Home      Platform used for Video Visit: Joe     Total time spent on day of encounter is 26 mins, including reviewing record, review of above studies, above visit with patient, symptomatic discussion of different types of pain, including medication adjustments/prescription management, and documentation.     Vicky Colvin DO  Palliative Medicine   AMCOM ID [id number]    Some chart documentation performed using Dragon Voice recognition Software. Although reviewed after completion, some words and grammatical errors may remain.        Again, thank you for allowing me to participate in the care of your patient.        Sincerely,        Vicky Colvin DO

## 2023-05-19 DIAGNOSIS — R07.81 RIB PAIN: ICD-10-CM

## 2023-05-19 DIAGNOSIS — R07.81 RIB PAIN ON LEFT SIDE: ICD-10-CM

## 2023-05-19 DIAGNOSIS — M79.18 MUSCULOSKELETAL PAIN: ICD-10-CM

## 2023-05-19 DIAGNOSIS — M79.2 NEUROPATHIC PAIN: ICD-10-CM

## 2023-05-19 DIAGNOSIS — M62.838 MUSCLE SPASM: ICD-10-CM

## 2023-05-19 DIAGNOSIS — Z93.4 JEJUNOSTOMY TUBE PRESENT (H): ICD-10-CM

## 2023-05-19 DIAGNOSIS — G71.01 DMD (DUCHENNE MUSCULAR DYSTROPHY) (H): Primary | ICD-10-CM

## 2023-05-19 DIAGNOSIS — G71.01 DMD (DUCHENNE MUSCULAR DYSTROPHY) (H): ICD-10-CM

## 2023-05-19 DIAGNOSIS — Z93.0 TRACHEOSTOMY PRESENT (H): ICD-10-CM

## 2023-05-19 DIAGNOSIS — Z87.81 HISTORY OF VERTEBRAL FRACTURE: ICD-10-CM

## 2023-05-19 RX ORDER — OXYCODONE HCL 5 MG/5 ML
5 SOLUTION, ORAL ORAL EVERY 4 HOURS PRN
Qty: 40 ML | Refills: 0 | Status: SHIPPED | OUTPATIENT
Start: 2023-05-19 | End: 2023-07-12

## 2023-05-19 RX ORDER — OXYCODONE HYDROCHLORIDE 5 MG/1
5 TABLET ORAL EVERY 4 HOURS PRN
Qty: 8 TABLET | Refills: 0 | Status: SHIPPED | OUTPATIENT
Start: 2023-05-19 | End: 2024-01-10

## 2023-05-19 RX ORDER — METHOCARBAMOL 500 MG/1
TABLET, FILM COATED ORAL
Qty: 120 TABLET | Refills: 11 | Status: SHIPPED | OUTPATIENT
Start: 2023-05-19 | End: 2023-09-13

## 2023-05-19 RX ORDER — GABAPENTIN 300 MG/1
900 CAPSULE ORAL AT BEDTIME
Qty: 90 CAPSULE | Refills: 11 | Status: SHIPPED | OUTPATIENT
Start: 2023-05-19 | End: 2024-03-28

## 2023-05-19 RX ORDER — GABAPENTIN 100 MG/1
CAPSULE ORAL
Qty: 60 CAPSULE | Refills: 3 | Status: SHIPPED | OUTPATIENT
Start: 2023-05-19 | End: 2024-01-10

## 2023-05-19 NOTE — TELEPHONE ENCOUNTER
Resending prescription for oxycodone. Pharmacy unable to get oxycodone liquid. Will send a prescription for oxycodone 5 mg tablets instead. Pt's mom aware.    SHONNA Esquivel, RN  Palliative Care Nurse Clinician    [phone] (Direct)  [phone] (Main)  [phone] (Appointment Scheduling)

## 2023-05-19 NOTE — TELEPHONE ENCOUNTER
Received telephone call from patient's mom requesting refill of gabapentin, methocarbamol and oxycodone. The pharmacy pt has been using unexpectedly closed 3 days ago. Pt is needing all of his prescriptions sent to a new pharmacy..     Last refill of gabapentin: 4/19/23  Last refill of oxycodone: 4/13/23  Last office visit: 5/17/23  Scheduled for follow up 9/13/23    Will route request to MD for review.     [place]ed [place].

## 2023-06-11 ENCOUNTER — HEALTH MAINTENANCE LETTER (OUTPATIENT)
Age: 33
End: 2023-06-11

## 2023-07-12 DIAGNOSIS — Z93.4 JEJUNOSTOMY TUBE PRESENT (H): ICD-10-CM

## 2023-07-12 DIAGNOSIS — Z93.0 TRACHEOSTOMY PRESENT (H): ICD-10-CM

## 2023-07-12 DIAGNOSIS — G71.01 DMD (DUCHENNE MUSCULAR DYSTROPHY) (H): ICD-10-CM

## 2023-07-12 RX ORDER — OXYCODONE HCL 5 MG/5 ML
5 SOLUTION, ORAL ORAL EVERY 4 HOURS PRN
Qty: 40 ML | Refills: 0 | Status: SHIPPED | OUTPATIENT
Start: 2023-07-12 | End: 2023-09-01

## 2023-07-12 NOTE — TELEPHONE ENCOUNTER
Received voicemail from patient's mother requesting refill of oxycodone. She is requesting to return to using liquid oxycodone.     Last refill: 5/19/23 (tablets)  Last office visit: 5/17/23  Scheduled for follow up 9/13/23     Will route request to MD for review.     [place]ed [place].

## 2023-09-01 DIAGNOSIS — Z93.4 JEJUNOSTOMY TUBE PRESENT (H): ICD-10-CM

## 2023-09-01 DIAGNOSIS — Z93.0 TRACHEOSTOMY PRESENT (H): ICD-10-CM

## 2023-09-01 DIAGNOSIS — G71.01 DMD (DUCHENNE MUSCULAR DYSTROPHY) (H): ICD-10-CM

## 2023-09-01 NOTE — TELEPHONE ENCOUNTER
Received telephone call from patient  mother requesting refill of oxycodone.     Last refill: 7/12/2023  Last office visit: 5/17/2023  Scheduled for follow up 9/13/2023     Will route request to MD for review.     [place]ed [place].

## 2023-09-04 RX ORDER — OXYCODONE HCL 5 MG/5 ML
5 SOLUTION, ORAL ORAL EVERY 4 HOURS PRN
Qty: 40 ML | Refills: 0 | Status: SHIPPED | OUTPATIENT
Start: 2023-09-04 | End: 2023-10-13

## 2023-09-05 NOTE — TELEPHONE ENCOUNTER
Encounter entered in error. Refill order already processed.    DWIGHT EsquivelN, RN  Palliative Care Nurse Clinician    [phone] (Direct)  [phone] (Main)  [phone] (Appointment Scheduling)

## 2023-09-13 ENCOUNTER — VIRTUAL VISIT (OUTPATIENT)
Dept: ONCOLOGY | Facility: CLINIC | Age: 33
End: 2023-09-13
Attending: STUDENT IN AN ORGANIZED HEALTH CARE EDUCATION/TRAINING PROGRAM
Payer: MEDICAID

## 2023-09-13 VITALS
DIASTOLIC BLOOD PRESSURE: 64 MMHG | HEIGHT: 64 IN | BODY MASS INDEX: 19.63 KG/M2 | SYSTOLIC BLOOD PRESSURE: 100 MMHG | WEIGHT: 115 LBS

## 2023-09-13 DIAGNOSIS — M62.838 MUSCLE SPASM: ICD-10-CM

## 2023-09-13 DIAGNOSIS — Z51.5 ENCOUNTER FOR PALLIATIVE CARE: ICD-10-CM

## 2023-09-13 DIAGNOSIS — M79.18 MUSCULOSKELETAL PAIN: ICD-10-CM

## 2023-09-13 DIAGNOSIS — G47.9 DIFFICULTY SLEEPING: ICD-10-CM

## 2023-09-13 DIAGNOSIS — G71.01 DMD (DUCHENNE MUSCULAR DYSTROPHY) (H): Primary | ICD-10-CM

## 2023-09-13 DIAGNOSIS — R07.81 RIB PAIN ON LEFT SIDE: ICD-10-CM

## 2023-09-13 PROCEDURE — 99214 OFFICE O/P EST MOD 30 MIN: CPT | Mod: VID | Performed by: STUDENT IN AN ORGANIZED HEALTH CARE EDUCATION/TRAINING PROGRAM

## 2023-09-13 RX ORDER — METHOCARBAMOL 500 MG/1
500-1000 TABLET, FILM COATED ORAL 4 TIMES DAILY PRN
Qty: 180 TABLET | Refills: 4 | Status: SHIPPED | OUTPATIENT
Start: 2023-09-13 | End: 2024-04-05

## 2023-09-13 NOTE — NURSING NOTE
Is the patient currently in the state of [place]? YES    Visit mode:VIDEO    If the visit is dropped, the patient can be reconnected by: VIDEO VISIT: Send to e-mail at: [email or web address]    Will anyone else be joining the visit? NO  (If patient encounters technical issues they should call [phone]    How would you like to obtain your AVS? MyChart    Are changes needed to the allergy or medication list? No    Reason for visit: Video Visit (Follow Up )    Rubina ALEXIS

## 2023-09-13 NOTE — LETTER
"    9/13/2023         RE: Ernesto Harding  [address]  [facility] [address]        Dear Colleague,    Thank you for referring your patient, Ernesto Harding, to the [facility]. Please see a copy of my visit note below.    Palliative Care Progress Note    Patient Name: Ernesto Harding  Primary Provider: Arianne Weeks    Chief Complaint/Patient ID: Ernesto Harding is a 33 year old male with PMHx of  duchenne's muscular dystrophy, s/p tracheostomy, ventilator and JT placment. Transferred from [facility] due to his advancing age.     Patient has history of chronic respiratory failure, ventilator dependent, osteoporsis, seasonal allergies and cardiomyopathy (7/19/2019 echocardiogram EF 45-50%).     5 mg oxycodone prn with trach changes and severe periodic episodes of pain from vertebral fractures.     Has 24/7 nursing.    Last Palliative care appointment: 5/17/23 with me.      Reviewed: Yes    Interim History:  Ernesto Harding is a 33 year old male who is seen today for follow up with Palliative Care via billable video visit.      Says that he was sick for about 5 weeks this summer.  Started with a cold and then progressed to pneumonia.  Says he was absolutely miserable with coughing and his secretions were so thick they thought his suction machines were broken.  Treated for Pneumonia with tobramycin in August.  Says he finally turned the corner 4 days ago and is feeling much more back to his normal now.    When he was coughing more, he did throw his back out and did need an additional dose of oxycodone a few days.  Generally tries to manage with Tylenol.  Says he wonders if there is anything that is \"between Tylenol and oxycodone\".    Has found the Robaxin to be helpful for his neck spasm pain.  Currently taking 500 mg in the morning, 1000 mg middle of the day, and 500 mg at bedtime.    Gabapentin still works well.  Does not feel any changes are needed.    Occasionally has " some trouble falling asleep at night and will use an Aleve PM.  When he does this, he finds he does wake up feeling a little better in the morning.     Social History:  Living Situation: Lives with his Dad and a cousin. Also has PCA and 24H nursing care. His Mom lives about a mile away.  Actual/Potential Caregiver(s): See above.  Support System: Multiple friends and family members  Hobbies: Reading, sport, TV, Crescencio  Social History     Tobacco Use     Smoking status: Never     Smokeless tobacco: Never     Family History- Reviewed in Epic.    Allergies   Allergen Reactions     Duloxetine Nausea and Vomiting     Pollen Extract      Advanced Care Planning: Has completed HCD.  His mother is his HCA. POLST on file stating DNAR/Full Treatment.     Medications- Reviewed in Epic.    Past Medical History- Reviewed in [employer].    Past Surgical History- Reviewed in Epic.    Review of Systems:   ROS: 10 point ROS neg other than the symptoms noted above in the HPI.    Physical Exam:   Constitutional: Alert young man sitting in a hospital bed in no acute distress.   ENT: Tracheostomy/ventilator.  Speech is slow and nearly normal through the trach.    Respiratory: Respirations unlabored, no cough, speaking full sentences.   MSK: Thin with advanced muscle loss.  Skin:  Skin with no obvious lesions.    Neuro: Face is symmetric.    Psych: Affect is full, pleasant, interactive.      Key Data Reviewed:  None new.    Impression & Recommendations & Counseling:  Ernesto Harding is a 33 year old male with history of Duchenne's Muscular Dystrophy complicated by chronic back, neck, chest wall and upper extremity pain as well as pain during tracheostomy changes.     Pain, MSK and Neuropathic  Muscle spasms  Hx vertebral fractures -  Stable for the most part.  Pain was a little bit worse when he was coughing more and threw his back out.  - Continue oxycodone 5 mg 30 minutes before trach changes.  He did okay with his last J-tube change, and is  planning to not use the oxycodone if possible going forward for these.  Additionally okay to use a 5 mg dose Q4H PRN for severe pain. Using very sparingly.  -Updated prescription on Robaxin to reflect 500-1000 mg up to 4 times daily as needed if he has another acute flare of severe muscular pain.  Currently taking 500 mg, 1000 mg, and 500 mg daily.  -Suggested trial of ibuprofen or naproxen (not on an empty stomach) if he has another episode of severe pain from throwing his back out, as this might work a little bit better than the Tylenol.    -Continue gabapentin 100mg in AM and 900mg in evening.    Difficulty sleeping - Intermittent.  Responds to Aleve PM.  Discussed that if he has a pain component, certainly taking the Aleve p.m. is reasonable.  If he finds that it is just difficulty falling asleep, it is also reasonable to take a single dose of Benadryl.    Follow up: 4 months    Video-Visit Details  Video Start Time: 3:11 PM  Video End Time: 3:31 PM    Originating Location (pt. Location): Home     Distant Location (provider location):  Offsite- Personal Home    Platform used for Video Visit: [employer]     Total time spent on day of encounter is 36 mins, including reviewing record, review of above studies, above visit with patient, symptomatic discussion as above, including medication adjustments/prescription management, and documentation.     Vicky Colvin DO  Palliative Medicine   [id number]    Some chart documentation performed using Dragon Voice recognition Software. Although reviewed after completion, some words and grammatical errors may remain.      Again, thank you for allowing me to participate in the care of your patient.        Sincerely,        Vicky Colvin DO

## 2023-09-13 NOTE — PATIENT INSTRUCTIONS
Recommendations:  -Updated prescription on Robaxin to reflect 500-1000 mg up to 4 times daily as needed if you have another severe episode of muscular pain including throwing your back out.  -Okay to use Benadryl alone if you are having trouble sleeping and not having any issues with pain.    Follow up: 4 months      Reasons to Call    If you are having worsening/uncontrolled symptoms we want you to call!    You or your other physicians make any changes to medications we have prescribed.  -Please call for refills 4-5 days before you will run out of medication.    Important Phone Numbers, including: Refills, scheduling, and general questions     Palliative Care RN: Shyanne Lamas - [phone]  *After hours or on weekends. Will connect you with on call MD. [phone]

## 2023-09-13 NOTE — PROGRESS NOTES
"Palliative Care Progress Note    Patient Name: Ernesto Harding  Primary Provider: Arianne Weeks    Chief Complaint/Patient ID: Ernesto Harding is a 33 year old male with PMHx of  duchenne's muscular dystrophy, s/p tracheostomy, ventilator and JT placment. Transferred from [facility] due to his advancing age.     Patient has history of chronic respiratory failure, ventilator dependent, osteoporsis, seasonal allergies and cardiomyopathy (7/19/2019 echocardiogram EF 45-50%).     5 mg oxycodone prn with trach changes and severe periodic episodes of pain from vertebral fractures.     Has 24/7 nursing.    Last Palliative care appointment: 5/17/23 with me.      Reviewed: Yes    Interim History:  Ernesto Harding is a 33 year old male who is seen today for follow up with Palliative Care via billable video visit.      Says that he was sick for about 5 weeks this summer.  Started with a cold and then progressed to pneumonia.  Says he was absolutely miserable with coughing and his secretions were so thick they thought his suction machines were broken.  Treated for Pneumonia with tobramycin in August.  Says he finally turned the corner 4 days ago and is feeling much more back to his normal now.    When he was coughing more, he did throw his back out and did need an additional dose of oxycodone a few days.  Generally tries to manage with Tylenol.  Says he wonders if there is anything that is \"between Tylenol and oxycodone\".    Has found the Robaxin to be helpful for his neck spasm pain.  Currently taking 500 mg in the morning, 1000 mg middle of the day, and 500 mg at bedtime.    Gabapentin still works well.  Does not feel any changes are needed.    Occasionally has some trouble falling asleep at night and will use an Aleve PM.  When he does this, he finds he does wake up feeling a little better in the morning.     Social History:  Living Situation: Lives with his Dad and a cousin. Also has PCA and 24H nursing " care. His Mom lives about a mile away.  Actual/Potential Caregiver(s): See above.  Support System: Multiple friends and family members  Hobbies: Reading, sport, TV, Crescencio  Social History     Tobacco Use    Smoking status: Never    Smokeless tobacco: Never     Family History- Reviewed in Epic.    Allergies   Allergen Reactions    Duloxetine Nausea and Vomiting    Pollen Extract      Advanced Care Planning: Has completed HCD.  His mother is his HCA. POLST on file stating DNAR/Full Treatment.     Medications- Reviewed in Epic.    Past Medical History- Reviewed in [facility].    Past Surgical History- Reviewed in Epic.    Review of Systems:   ROS: 10 point ROS neg other than the symptoms noted above in the HPI.    Physical Exam:   Constitutional: Alert young man sitting in a hospital bed in no acute distress.   ENT: Tracheostomy/ventilator.  Speech is slow and nearly normal through the trach.    Respiratory: Respirations unlabored, no cough, speaking full sentences.   MSK: Thin with advanced muscle loss.  Skin:  Skin with no obvious lesions.    Neuro: Face is symmetric.    Psych: Affect is full, pleasant, interactive.      Key Data Reviewed:  None new.    Impression & Recommendations & Counseling:  Ernesto Harding is a 33 year old male with history of Duchenne's Muscular Dystrophy complicated by chronic back, neck, chest wall and upper extremity pain as well as pain during tracheostomy changes.     Pain, MSK and Neuropathic  Muscle spasms  Hx vertebral fractures -  Stable for the most part.  Pain was a little bit worse when he was coughing more and threw his back out.  - Continue oxycodone 5 mg 30 minutes before trach changes.  He did okay with his last J-tube change, and is planning to not use the oxycodone if possible going forward for these.  Additionally okay to use a 5 mg dose Q4H PRN for severe pain. Using very sparingly.  -Updated prescription on Robaxin to reflect 500-1000 mg up to 4 times daily as needed if he has  another acute flare of severe muscular pain.  Currently taking 500 mg, 1000 mg, and 500 mg daily.  -Suggested trial of ibuprofen or naproxen (not on an empty stomach) if he has another episode of severe pain from throwing his back out, as this might work a little bit better than the Tylenol.    -Continue gabapentin 100mg in AM and 900mg in evening.    Difficulty sleeping - Intermittent.  Responds to Aleve PM.  Discussed that if he has a pain component, certainly taking the Aleve p.m. is reasonable.  If he finds that it is just difficulty falling asleep, it is also reasonable to take a single dose of Benadryl.    Follow up: 4 months    Video-Visit Details  Video Start Time: 3:11 PM  Video End Time: 3:31 PM    Originating Location (pt. Location): Home     Distant Location (provider location):  Offsite- Personal Home    Platform used for Video Visit: [employer]     Total time spent on day of encounter is 36 mins, including reviewing record, review of above studies, above visit with patient, symptomatic discussion as above, including medication adjustments/prescription management, and documentation.     Vicky Colvin,   Palliative Medicine   [id number]    Some chart documentation performed using Dragon Voice recognition Software. Although reviewed after completion, some words and grammatical errors may remain.

## 2023-09-13 NOTE — LETTER
"    9/13/2023         RE: Ernesto Harding  [address]  [facility] [address]        Dear Colleague,    Thank you for referring your patient, Ernesto Harding, to the [facility]. Please see a copy of my visit note below.    Palliative Care Progress Note    Patient Name: Ernesto Harding  Primary Provider: Arianne Weeks    Chief Complaint/Patient ID: Ernesto Harding is a 33 year old male with PMHx of  duchenne's muscular dystrophy, s/p tracheostomy, ventilator and JT placment. Transferred from [facility] due to his advancing age.     Patient has history of chronic respiratory failure, ventilator dependent, osteoporsis, seasonal allergies and cardiomyopathy (7/19/2019 echocardiogram EF 45-50%).     5 mg oxycodone prn with trach changes and severe periodic episodes of pain from vertebral fractures.     Has 24/7 nursing.    Last Palliative care appointment: 5/17/23 with me.      Reviewed: Yes    Interim History:  Ernesto Harding is a 33 year old male who is seen today for follow up with Palliative Care via billable video visit.      Says that he was sick for about 5 weeks this summer.  Started with a cold and then progressed to pneumonia.  Says he was absolutely miserable with coughing and his secretions were so thick they thought his suction machines were broken.  Treated for Pneumonia with tobramycin in August.  Says he finally turned the corner 4 days ago and is feeling much more back to his normal now.    When he was coughing more, he did throw his back out and did need an additional dose of oxycodone a few days.  Generally tries to manage with Tylenol.  Says he wonders if there is anything that is \"between Tylenol and oxycodone\".    Has found the Robaxin to be helpful for his neck spasm pain.  Currently taking 500 mg in the morning, 1000 mg middle of the day, and 500 mg at bedtime.    Gabapentin still works well.  Does not feel any changes are needed.    Occasionally has " some trouble falling asleep at night and will use an Aleve PM.  When he does this, he finds he does wake up feeling a little better in the morning.     Social History:  Living Situation: Lives with his Dad and a cousin. Also has PCA and 24H nursing care. His Mom lives about a mile away.  Actual/Potential Caregiver(s): See above.  Support System: Multiple friends and family members  Hobbies: Reading, sport, TV, Crescencio  Social History     Tobacco Use     Smoking status: Never     Smokeless tobacco: Never     Family History- Reviewed in Epic.    Allergies   Allergen Reactions     Duloxetine Nausea and Vomiting     Pollen Extract      Advanced Care Planning: Has completed HCD.  His mother is his HCA. POLST on file stating DNAR/Full Treatment.     Medications- Reviewed in Epic.    Past Medical History- Reviewed in [employer]    Past Surgical History- Reviewed in Epic.    Review of Systems:   ROS: 10 point ROS neg other than the symptoms noted above in the HPI.    Physical Exam:   Constitutional: Alert young man sitting in a hospital bed in no acute distress.   ENT: Tracheostomy/ventilator.  Speech is slow and nearly normal through the trach.    Respiratory: Respirations unlabored, no cough, speaking full sentences.   MSK: Thin with advanced muscle loss.  Skin:  Skin with no obvious lesions.    Neuro: Face is symmetric.    Psych: Affect is full, pleasant, interactive.      Key Data Reviewed:  None new.    Impression & Recommendations & Counseling:  Ernesto Harding is a 33 year old male with history of Duchenne's Muscular Dystrophy complicated by chronic back, neck, chest wall and upper extremity pain as well as pain during tracheostomy changes.     Pain, MSK and Neuropathic  Muscle spasms  Hx vertebral fractures -  Stable for the most part.  Pain was a little bit worse when he was coughing more and threw his back out.  - Continue oxycodone 5 mg 30 minutes before trach changes.  He did okay with his last J-tube change, and is  planning to not use the oxycodone if possible going forward for these.  Additionally okay to use a 5 mg dose Q4H PRN for severe pain. Using very sparingly.  -Updated prescription on Robaxin to reflect 500-1000 mg up to 4 times daily as needed if he has another acute flare of severe muscular pain.  Currently taking 500 mg, 1000 mg, and 500 mg daily.  -Suggested trial of ibuprofen or naproxen (not on an empty stomach) if he has another episode of severe pain from throwing his back out, as this might work a little bit better than the Tylenol.    -Continue gabapentin 100mg in AM and 900mg in evening.    Difficulty sleeping - Intermittent.  Responds to Aleve PM.  Discussed that if he has a pain component, certainly taking the Aleve p.m. is reasonable.  If he finds that it is just difficulty falling asleep, it is also reasonable to take a single dose of Benadryl.    Follow up: 4 months    Video-Visit Details  Video Start Time: 3:11 PM  Video End Time: 3:31 PM    Originating Location (pt. Location): Home     Distant Location (provider location):  Offsite- Personal Home    Platform used for Video Visit: [facility]     Total time spent on day of encounter is 36 mins, including reviewing record, review of above studies, above visit with patient, symptomatic discussion as above, including medication adjustments/prescription management, and documentation.     Vicky Colvin DO  Palliative Medicine   [id number]    Some chart documentation performed using Dragon Voice recognition Software. Although reviewed after completion, some words and grammatical errors may remain.      Again, thank you for allowing me to participate in the care of your patient.        Sincerely,        Vicky Colvin DO

## 2023-10-13 DIAGNOSIS — Z93.0 TRACHEOSTOMY PRESENT (H): ICD-10-CM

## 2023-10-13 DIAGNOSIS — Z93.4 JEJUNOSTOMY TUBE PRESENT (H): ICD-10-CM

## 2023-10-13 DIAGNOSIS — G71.01 DMD (DUCHENNE MUSCULAR DYSTROPHY) (H): ICD-10-CM

## 2023-10-13 RX ORDER — OXYCODONE HCL 5 MG/5 ML
5 SOLUTION, ORAL ORAL EVERY 4 HOURS PRN
Qty: 40 ML | Refills: 0 | Status: SHIPPED | OUTPATIENT
Start: 2023-10-13 | End: 2023-11-28

## 2023-10-13 NOTE — TELEPHONE ENCOUNTER
Received telephone call from patient's mom requesting refill of oxycodone.     Last refill: 9/5/23  Last office visit: 9/13/23  Scheduled for follow up 1/10/24     Will route request to MD for review.     Reviewed [place]  Report.    
fever

## 2023-11-28 DIAGNOSIS — Z93.0 TRACHEOSTOMY PRESENT (H): ICD-10-CM

## 2023-11-28 DIAGNOSIS — G71.01 DMD (DUCHENNE MUSCULAR DYSTROPHY) (H): ICD-10-CM

## 2023-11-28 DIAGNOSIS — Z93.4 JEJUNOSTOMY TUBE PRESENT (H): ICD-10-CM

## 2023-11-28 RX ORDER — OXYCODONE HCL 5 MG/5 ML
5 SOLUTION, ORAL ORAL EVERY 4 HOURS PRN
Qty: 40 ML | Refills: 0 | Status: SHIPPED | OUTPATIENT
Start: 2023-11-28 | End: 2023-12-29

## 2023-11-28 NOTE — TELEPHONE ENCOUNTER
Received telephone call from patient requesting refill of oxycodone.     Last refill: 10/13/23  Last office visit: 9/13/23  Scheduled for follow up 1/10/24     Will route request to MD for review.     Reviewed [place]  Report.

## 2023-12-29 DIAGNOSIS — Z93.4 JEJUNOSTOMY TUBE PRESENT (H): ICD-10-CM

## 2023-12-29 DIAGNOSIS — Z93.0 TRACHEOSTOMY PRESENT (H): ICD-10-CM

## 2023-12-29 DIAGNOSIS — G71.01 DMD (DUCHENNE MUSCULAR DYSTROPHY) (H): ICD-10-CM

## 2023-12-29 RX ORDER — OXYCODONE HCL 5 MG/5 ML
5 SOLUTION, ORAL ORAL EVERY 4 HOURS PRN
Qty: 40 ML | Refills: 0 | Status: SHIPPED | OUTPATIENT
Start: 2023-12-29 | End: 2024-02-02

## 2023-12-29 NOTE — TELEPHONE ENCOUNTER
Received telephone call from patient requesting refill of oxycodone.     Last refill: 11/28/2023  Last office visit: 9/13/2023  Scheduled for follow up 1/10/2024     Will route request to MD for review.     Reviewed [place]  Report.

## 2024-01-10 ENCOUNTER — VIRTUAL VISIT (OUTPATIENT)
Dept: ONCOLOGY | Facility: CLINIC | Age: 34
End: 2024-01-10
Attending: STUDENT IN AN ORGANIZED HEALTH CARE EDUCATION/TRAINING PROGRAM
Payer: MEDICAID

## 2024-01-10 VITALS
DIASTOLIC BLOOD PRESSURE: 70 MMHG | BODY MASS INDEX: 22.71 KG/M2 | SYSTOLIC BLOOD PRESSURE: 110 MMHG | HEIGHT: 64 IN | WEIGHT: 133 LBS

## 2024-01-10 DIAGNOSIS — M62.838 MUSCLE SPASM: ICD-10-CM

## 2024-01-10 DIAGNOSIS — Z79.891 ENCOUNTER FOR LONG-TERM USE OF OPIATE ANALGESIC: ICD-10-CM

## 2024-01-10 DIAGNOSIS — G71.01 DMD (DUCHENNE MUSCULAR DYSTROPHY) (H): Primary | ICD-10-CM

## 2024-01-10 DIAGNOSIS — M79.18 MUSCULOSKELETAL PAIN: ICD-10-CM

## 2024-01-10 DIAGNOSIS — Z93.0 TRACHEOSTOMY PRESENT (H): ICD-10-CM

## 2024-01-10 DIAGNOSIS — M79.2 NEUROPATHIC PAIN: ICD-10-CM

## 2024-01-10 DIAGNOSIS — Z51.5 ENCOUNTER FOR PALLIATIVE CARE: ICD-10-CM

## 2024-01-10 DIAGNOSIS — G47.9 DIFFICULTY SLEEPING: ICD-10-CM

## 2024-01-10 DIAGNOSIS — R07.81 RIB PAIN ON LEFT SIDE: ICD-10-CM

## 2024-01-10 PROCEDURE — 99215 OFFICE O/P EST HI 40 MIN: CPT | Mod: 95 | Performed by: STUDENT IN AN ORGANIZED HEALTH CARE EDUCATION/TRAINING PROGRAM

## 2024-01-10 RX ORDER — GABAPENTIN 100 MG/1
CAPSULE ORAL
Qty: 60 CAPSULE | Refills: 3 | Status: SHIPPED | OUTPATIENT
Start: 2024-01-10 | End: 2024-03-27

## 2024-01-10 ASSESSMENT — PAIN SCALES - GENERAL: PAINLEVEL: NO PAIN (0)

## 2024-01-10 NOTE — LETTER
1/10/2024         RE: Ernesto Harding  [address]  [facility] [address]        Dear Colleague,    Thank you for referring your patient, Ernesto Harding, to the [facility]. Please see a copy of my visit note below.    Palliative Care Progress Note    Patient Name: Ernesto Harding  Primary Provider: Arianne Weeks    Chief Complaint/Patient ID: Ernesto Harding is a 33 year old male with PMHx of duchenne's muscular dystrophy, s/p tracheostomy, ventilator and JT placment. Transferred from [facility] due to his advancing age.     Patient has history of chronic respiratory failure, ventilator dependent, osteoporsis, seasonal allergies and cardiomyopathy (7/19/2019 echocardiogram EF 45-50%).     5 mg oxycodone prn with trach changes and severe periodic episodes of pain from vertebral fractures.     Has 24/7 nursing.    Last Palliative care appointment: 9/13/23 with me.      Reviewed: Yes    Interim History:  Ernesto Harding is a 33 year old male who is seen today for follow up with Palliative Care via billable video visit.      States he is overall doing fairly well right now.  He did have about a 3-week stretch where he had extra pain throughout his body including his back, ribs, legs, and neck.  He said this felt more muscular in nature, however it did seem to improve a little bit when he had it and the extra 100 mg of gabapentin in the morning.  He also took an occasional dose of extra Robaxin as well as a couple doses of oxycodone.  He was just about to see his primary care doctor when the symptoms resolved.  He is now back to his baseline.  Denies any other symptom changes including respiratory symptoms, GI symptoms, or fevers.    His weight was down last time we spoke, however he focused on building this back up, and he states he is actually at the highest weight he has been in years.  Currently weighs 133 pounds.  He is glad to know that he is able to put weight  back on if he tries/needs to.    States that sleep is better.  Rarely needing to use Benadryl now.  The only nights that he really struggles is when he is having issues with his trach.     Social History:  Living Situation: Lives with his Dad and a cousin. Also has PCA and 24H nursing care. His Mom lives about a mile away.  Actual/Potential Caregiver(s): See above.  Support System: Multiple friends and family members  Hobbies: Reading, sport, TV, Crescencio  Social History     Tobacco Use     Smoking status: Never     Smokeless tobacco: Never     Advanced Care Planning: Has completed HCD.  His mother is his HCA. POLST on file stating DNAR/Full Treatment.      Family History- Reviewed in Epic.    Medications- Reviewed in Epic.    Past Medical History- Reviewed in [employer].    Past Surgical History- Reviewed in Epic.    Physical Exam:   Constitutional: Alert young man sitting in a hospital bed in no acute distress.   ENT: Tracheostomy/ventilator.  Speech is slow and nearly normal through the trach.    Respiratory: Respirations unlabored, no cough, speaking full sentences.   MSK: Thin with advanced muscle loss.  Skin:  Skin with no obvious lesions.    Neuro: Face is symmetric.    Psych: Affect is full, pleasant, interactive.    Key Data Reviewed:  None new.    Impression & Recommendations & Counseling:  Ernesto Harding is a 33 year old male with history of Duchenne's Muscular Dystrophy complicated by chronic back, neck, chest wall and upper extremity pain as well as pain during tracheostomy changes.        Pain, MSK and Neuropathic  Muscle spasms  Hx vertebral fractures - Stable for the most part now.  He did have a stretch of 3 weeks where pain was increased for unclear/unknown reason.  Has returned to baseline now.  - Continue oxycodone 5 mg 30 minutes before trach changes. Additionally okay to use a 5 mg dose Q4H PRN for severe pain. Using very sparingly.  -Continue Robaxin 500-1000 mg up to 4 times daily as needed if he has  another acute flare of severe muscular pain.  Currently taking 500 mg, 1000 mg, and 500 mg daily.  -Continue gabapentin 100mg in AM and 900mg in evening.  He knows he can add in the extra 100 mg in the morning if needed.  -If he has another pain exacerbation like he did a few weeks ago, I told him to call us.  I would probably recommend still checking in with his PCP, however I could make some recommendations to his medications before that.  -I actually did wonder if the weight gain could have contributed to his increased pain spell.    Difficulty sleeping - Intermittent. Improved since last visit.  Very rare use of Benadryl.      Follow up: 3 to 4 months      Video-Visit Details  Video Start Time: 3:17 PM  Video End Time: 3:37 PM    Originating Location (pt. Location): Home     Distant Location (provider location):  Offsite- Personal Home      Platform used for Video Visit: Joe     Total time spent on day of encounter is 35 mins, including reviewing record, review of above studies, above visit with patient, symptomatic discussion as above, including medication adjustments/prescription management, and documentation.     Vicky Colvin DO  Palliative Medicine   [facility] [place] [id number]    Some chart documentation performed using Dragon Voice recognition Software. Although reviewed after completion, some words and grammatical errors may remain.      Again, thank you for allowing me to participate in the care of your patient.        Sincerely,        Vicky Colvin DO

## 2024-01-10 NOTE — LETTER
1/10/2024         RE: Ernesto Harding  [address]  [facility] [address]        Dear Colleague,    Thank you for referring your patient, Ernesto Harding, to the [facility]. Please see a copy of my visit note below.    Palliative Care Progress Note    Patient Name: Ernesto Harding  Primary Provider: Arianne Weeks    Chief Complaint/Patient ID: Ernesto Harding is a 33 year old male with PMHx of duchenne's muscular dystrophy, s/p tracheostomy, ventilator and JT placment. Transferred from [facility] due to his advancing age.     Patient has history of chronic respiratory failure, ventilator dependent, osteoporsis, seasonal allergies and cardiomyopathy (7/19/2019 echocardiogram EF 45-50%).     5 mg oxycodone prn with trach changes and severe periodic episodes of pain from vertebral fractures.     Has 24/7 nursing.    Last Palliative care appointment: 9/13/23 with me.      Reviewed: Yes    Interim History:  Ernesto Harding is a 33 year old male who is seen today for follow up with Palliative Care via billable video visit.      States he is overall doing fairly well right now.  He did have about a 3-week stretch where he had extra pain throughout his body including his back, ribs, legs, and neck.  He said this felt more muscular in nature, however it did seem to improve a little bit when he had it and the extra 100 mg of gabapentin in the morning.  He also took an occasional dose of extra Robaxin as well as a couple doses of oxycodone.  He was just about to see his primary care doctor when the symptoms resolved.  He is now back to his baseline.  Denies any other symptom changes including respiratory symptoms, GI symptoms, or fevers.    His weight was down last time we spoke, however he focused on building this back up, and he states he is actually at the highest weight he has been in years.  Currently weighs 133 pounds.  He is glad to know that he is able to put weight  back on if he tries/needs to.    States that sleep is better.  Rarely needing to use Benadryl now.  The only nights that he really struggles is when he is having issues with his trach.     Social History:  Living Situation: Lives with his Dad and a cousin. Also has PCA and 24H nursing care. His Mom lives about a mile away.  Actual/Potential Caregiver(s): See above.  Support System: Multiple friends and family members  Hobbies: Reading, sport, TV, Crescencio  Social History     Tobacco Use     Smoking status: Never     Smokeless tobacco: Never     Advanced Care Planning: Has completed HCD.  His mother is his HCA. POLST on file stating DNAR/Full Treatment.      Family History- Reviewed in Epic.    Medications- Reviewed in Epic.    Past Medical History- Reviewed in [employer].    Past Surgical History- Reviewed in Epic.    Physical Exam:   Constitutional: Alert young man sitting in a hospital bed in no acute distress.   ENT: Tracheostomy/ventilator.  Speech is slow and nearly normal through the trach.    Respiratory: Respirations unlabored, no cough, speaking full sentences.   MSK: Thin with advanced muscle loss.  Skin:  Skin with no obvious lesions.    Neuro: Face is symmetric.    Psych: Affect is full, pleasant, interactive.    Key Data Reviewed:  None new.    Impression & Recommendations & Counseling:  Ernesto Harding is a 33 year old male with history of Duchenne's Muscular Dystrophy complicated by chronic back, neck, chest wall and upper extremity pain as well as pain during tracheostomy changes.        Pain, MSK and Neuropathic  Muscle spasms  Hx vertebral fractures - Stable for the most part now.  He did have a stretch of 3 weeks where pain was increased for unclear/unknown reason.  Has returned to baseline now.  - Continue oxycodone 5 mg 30 minutes before trach changes. Additionally okay to use a 5 mg dose Q4H PRN for severe pain. Using very sparingly.  -Continue Robaxin 500-1000 mg up to 4 times daily as needed if he has  another acute flare of severe muscular pain.  Currently taking 500 mg, 1000 mg, and 500 mg daily.  -Continue gabapentin 100mg in AM and 900mg in evening.  He knows he can add in the extra 100 mg in the morning if needed.  -If he has another pain exacerbation like he did a few weeks ago, I told him to call us.  I would probably recommend still checking in with his PCP, however I could make some recommendations to his medications before that.  -I actually did wonder if the weight gain could have contributed to his increased pain spell.    Difficulty sleeping - Intermittent. Improved since last visit.  Very rare use of Benadryl.      Follow up: 3 to 4 months      Video-Visit Details  Video Start Time: 3:17 PM  Video End Time: 3:37 PM    Originating Location (pt. Location): Home     Distant Location (provider location):  Offsite- Personal Home      Platform used for Video Visit: Joe     Total time spent on day of encounter is 35 mins, including reviewing record, review of above studies, above visit with patient, symptomatic discussion as above, including medication adjustments/prescription management, and documentation.     Vicky Colvin DO  Palliative Medicine   [facility] [place] [id number]    Some chart documentation performed using Dragon Voice recognition Software. Although reviewed after completion, some words and grammatical errors may remain.      Again, thank you for allowing me to participate in the care of your patient.        Sincerely,        Vicky Colvin DO

## 2024-01-10 NOTE — PATIENT INSTRUCTIONS
Recommendations:  -Continue medications the same for now.  Let me know if you have any changes in his symptoms before we meet again.    Follow up: 3 to 4 months      Reasons to Call    If you are having worsening/uncontrolled symptoms we want you to call!    You or your other physicians make any changes to medications we have prescribed.  -Please call for refills 4-5 days before you will run out of medication.    Important Phone Numbers, including: Refills, scheduling, and general questions     Palliative Care RN: Shyanne Lamas - [phone]  *After hours or on weekends. Will connect you with on call MD. [phone]

## 2024-01-10 NOTE — PROGRESS NOTES
Palliative Care Progress Note    Patient Name: Ernesto Harding  Primary Provider: Arianne Weeks    Chief Complaint/Patient ID: Ernesto Harding is a 33 year old male with PMHx of duchenne's muscular dystrophy, s/p tracheostomy, ventilator and JT placment. Transferred from [facility] due to his advancing age.     Patient has history of chronic respiratory failure, ventilator dependent, osteoporsis, seasonal allergies and cardiomyopathy (7/19/2019 echocardiogram EF 45-50%).     5 mg oxycodone prn with trach changes and severe periodic episodes of pain from vertebral fractures.     Has 24/7 nursing.    Last Palliative care appointment: 9/13/23 with me.      Reviewed: Yes    Interim History:  Ernesto Harding is a 33 year old male who is seen today for follow up with Palliative Care via billable video visit.      States he is overall doing fairly well right now.  He did have about a 3-week stretch where he had extra pain throughout his body including his back, ribs, legs, and neck.  He said this felt more muscular in nature, however it did seem to improve a little bit when he had it and the extra 100 mg of gabapentin in the morning.  He also took an occasional dose of extra Robaxin as well as a couple doses of oxycodone.  He was just about to see his primary care doctor when the symptoms resolved.  He is now back to his baseline.  Denies any other symptom changes including respiratory symptoms, GI symptoms, or fevers.    His weight was down last time we spoke, however he focused on building this back up, and he states he is actually at the highest weight he has been in years.  Currently weighs 133 pounds.  He is glad to know that he is able to put weight back on if he tries/needs to.    States that sleep is better.  Rarely needing to use Benadryl now.  The only nights that he really struggles is when he is having issues with his trach.     Social History:  Living Situation: Lives with his Dad and a cousin.  Also has PCA and 24H nursing care. His Mom lives about a mile away.  Actual/Potential Caregiver(s): See above.  Support System: Multiple friends and family members  Hobbies: Reading, sport, TV, Crescencio  Social History     Tobacco Use    Smoking status: Never    Smokeless tobacco: Never     Advanced Care Planning: Has completed HCD.  His mother is his HCA. POLST on file stating DNAR/Full Treatment.      Family History- Reviewed in Epic.    Medications- Reviewed in Epic.    Past Medical History- Reviewed in [facility].    Past Surgical History- Reviewed in Epic.    Physical Exam:   Constitutional: Alert young man sitting in a hospital bed in no acute distress.   ENT: Tracheostomy/ventilator.  Speech is slow and nearly normal through the trach.    Respiratory: Respirations unlabored, no cough, speaking full sentences.   MSK: Thin with advanced muscle loss.  Skin:  Skin with no obvious lesions.    Neuro: Face is symmetric.    Psych: Affect is full, pleasant, interactive.    Key Data Reviewed:  None new.    Impression & Recommendations & Counseling:  Ernesto Harding is a 33 year old male with history of Duchenne's Muscular Dystrophy complicated by chronic back, neck, chest wall and upper extremity pain as well as pain during tracheostomy changes.        Pain, MSK and Neuropathic  Muscle spasms  Hx vertebral fractures - Stable for the most part now.  He did have a stretch of 3 weeks where pain was increased for unclear/unknown reason.  Has returned to baseline now.  - Continue oxycodone 5 mg 30 minutes before trach changes. Additionally okay to use a 5 mg dose Q4H PRN for severe pain. Using very sparingly.  -Continue Robaxin 500-1000 mg up to 4 times daily as needed if he has another acute flare of severe muscular pain.  Currently taking 500 mg, 1000 mg, and 500 mg daily.  -Continue gabapentin 100mg in AM and 900mg in evening.  He knows he can add in the extra 100 mg in the morning if needed.  -If he has another pain exacerbation  like he did a few weeks ago, I told him to call us.  I would probably recommend still checking in with his PCP, however I could make some recommendations to his medications before that.  -I actually did wonder if the weight gain could have contributed to his increased pain spell.    Difficulty sleeping - Intermittent. Improved since last visit.  Very rare use of Benadryl.      Follow up: 3 to 4 months      Video-Visit Details  Video Start Time: 3:17 PM  Video End Time: 3:37 PM    Originating Location (pt. Location): Home     Distant Location (provider location):  Offsite- Personal Home      Platform used for Video Visit: Joe     Total time spent on day of encounter is 31 mins, including reviewing record, review of above studies, above visit with patient, symptomatic discussion as above, including medication adjustments/prescription management, and documentation.     Vicky Colvin, DO  Palliative Medicine   [facility] [place] [id number]    Some chart documentation performed using Dragon Voice recognition Software. Although reviewed after completion, some words and grammatical errors may remain.

## 2024-01-10 NOTE — NURSING NOTE
Is the patient currently in the state of [place]? YES    Visit mode:VIDEO    If the visit is dropped, the patient can be reconnected by: VIDEO VISIT: Send to e-mail at: [email or web address]    Will anyone else be joining the visit? NO  (If patient encounters technical issues they should call [phone]    How would you like to obtain your AVS? MyChart    Are changes needed to the allergy or medication list? Pt stated no changes to allergies and Pt stated no med changes    Reason for visit: PONCHO SALGADOF

## 2024-02-02 DIAGNOSIS — G71.01 DMD (DUCHENNE MUSCULAR DYSTROPHY) (H): ICD-10-CM

## 2024-02-02 DIAGNOSIS — Z93.0 TRACHEOSTOMY PRESENT (H): ICD-10-CM

## 2024-02-02 DIAGNOSIS — Z93.4 JEJUNOSTOMY TUBE PRESENT (H): ICD-10-CM

## 2024-02-02 RX ORDER — OXYCODONE HCL 5 MG/5 ML
5 SOLUTION, ORAL ORAL EVERY 4 HOURS PRN
Qty: 40 ML | Refills: 0 | Status: SHIPPED | OUTPATIENT
Start: 2024-02-02 | End: 2024-02-27

## 2024-02-02 NOTE — TELEPHONE ENCOUNTER
Received voicemail from patient's mom requesting refill of oxycodone.     Last refill: 12/29/23  Last office visit: 1/10/24  Scheduled for follow up pending, msg sent to scheduling.     Will route request to MD for review.     Reviewed [place]  Report.

## 2024-02-27 DIAGNOSIS — G71.01 DMD (DUCHENNE MUSCULAR DYSTROPHY) (H): ICD-10-CM

## 2024-02-27 DIAGNOSIS — Z93.4 JEJUNOSTOMY TUBE PRESENT (H): ICD-10-CM

## 2024-02-27 DIAGNOSIS — Z93.0 TRACHEOSTOMY PRESENT (H): ICD-10-CM

## 2024-02-27 RX ORDER — OXYCODONE HCL 5 MG/5 ML
5 SOLUTION, ORAL ORAL EVERY 4 HOURS PRN
Qty: 40 ML | Refills: 0 | Status: SHIPPED | OUTPATIENT
Start: 2024-02-27 | End: 2024-03-25

## 2024-03-25 DIAGNOSIS — Z93.4 JEJUNOSTOMY TUBE PRESENT (H): ICD-10-CM

## 2024-03-25 DIAGNOSIS — G71.01 DMD (DUCHENNE MUSCULAR DYSTROPHY) (H): ICD-10-CM

## 2024-03-25 DIAGNOSIS — Z93.0 TRACHEOSTOMY PRESENT (H): ICD-10-CM

## 2024-03-25 RX ORDER — OXYCODONE HCL 5 MG/5 ML
5 SOLUTION, ORAL ORAL EVERY 4 HOURS PRN
Qty: 40 ML | Refills: 0 | Status: SHIPPED | OUTPATIENT
Start: 2024-03-25 | End: 2024-04-19

## 2024-03-25 NOTE — TELEPHONE ENCOUNTER
Received voicemail from patient's mother requesting refill of oxycodone.     Last refill: 2/28/24  Last office visit: 1/10/24  Scheduled for follow up 4/24/24     Will route request to MD for review.     Reviewed [place]  Report.

## 2024-03-26 ENCOUNTER — MYC MEDICAL ADVICE (OUTPATIENT)
Dept: ONCOLOGY | Facility: CLINIC | Age: 34
End: 2024-03-26
Payer: MEDICAID

## 2024-03-26 DIAGNOSIS — M79.18 MUSCULOSKELETAL PAIN: ICD-10-CM

## 2024-03-26 DIAGNOSIS — G71.01 DMD (DUCHENNE MUSCULAR DYSTROPHY) (H): ICD-10-CM

## 2024-03-27 DIAGNOSIS — M79.2 NEUROPATHIC PAIN: ICD-10-CM

## 2024-03-27 DIAGNOSIS — R07.81 RIB PAIN: ICD-10-CM

## 2024-03-27 RX ORDER — GABAPENTIN 100 MG/1
CAPSULE ORAL
COMMUNITY
Start: 2024-03-27 | End: 2024-03-28

## 2024-03-27 NOTE — TELEPHONE ENCOUNTER
Received electronic message from pharmacy requesting a new order for gabapentin 300 mg capsules.    Last refill: 3/4/24  Last office visit: 1/10/24  Scheduled for follow up 4/24/24     Will route request to MD for review.     Reviewed [place]  Report.

## 2024-03-28 DIAGNOSIS — G71.01 DMD (DUCHENNE MUSCULAR DYSTROPHY) (H): ICD-10-CM

## 2024-03-28 DIAGNOSIS — M79.18 MUSCULOSKELETAL PAIN: ICD-10-CM

## 2024-03-28 RX ORDER — GABAPENTIN 100 MG/1
CAPSULE ORAL
Qty: 90 CAPSULE | Refills: 11 | Status: SHIPPED | OUTPATIENT
Start: 2024-03-28

## 2024-03-28 RX ORDER — GABAPENTIN 300 MG/1
CAPSULE ORAL
Qty: 90 CAPSULE | Refills: 11 | Status: SHIPPED | OUTPATIENT
Start: 2024-03-28

## 2024-03-28 NOTE — TELEPHONE ENCOUNTER
Received voicemail from pharmacy requesting a new order reflecting changes to gabapentin 100 mg dosing.    Last refill: 2/18/24  Last office visit: 1/10/24  Scheduled for follow up 4/24/24     Will route request to MD for review.     Reviewed [place]  Report.

## 2024-04-04 DIAGNOSIS — M62.838 MUSCLE SPASM: ICD-10-CM

## 2024-04-04 DIAGNOSIS — M79.18 MUSCULOSKELETAL PAIN: ICD-10-CM

## 2024-04-04 DIAGNOSIS — R07.81 RIB PAIN ON LEFT SIDE: ICD-10-CM

## 2024-04-05 RX ORDER — METHOCARBAMOL 500 MG/1
TABLET, FILM COATED ORAL
Qty: 180 TABLET | Refills: 11 | Status: SHIPPED | OUTPATIENT
Start: 2024-04-05

## 2024-04-05 NOTE — TELEPHONE ENCOUNTER
Received MyChart message from pharmacy requesting refill of methocarbamol.     Last refill: 9/13/2023  Last office visit: 1/10/2024  Scheduled for follow up 4/24/2024    Will route request to MD for review.     Reviewed [place]  Report.

## 2024-04-19 DIAGNOSIS — Z93.0 TRACHEOSTOMY PRESENT (H): ICD-10-CM

## 2024-04-19 DIAGNOSIS — Z93.4 JEJUNOSTOMY TUBE PRESENT (H): ICD-10-CM

## 2024-04-19 DIAGNOSIS — G71.01 DMD (DUCHENNE MUSCULAR DYSTROPHY) (H): ICD-10-CM

## 2024-04-19 RX ORDER — OXYCODONE HCL 5 MG/5 ML
5 SOLUTION, ORAL ORAL EVERY 4 HOURS PRN
Qty: 40 ML | Refills: 0 | Status: SHIPPED | OUTPATIENT
Start: 2024-04-19 | End: 2024-05-20

## 2024-04-19 NOTE — TELEPHONE ENCOUNTER
Received telephone call from patient's mom requesting refill of oxycodone.     Last refill: 3/25/24  Last office visit: 1/10/24  Scheduled for follow up 4/24/24     Will route request to MD for review.     Reviewed [place]  Report.

## 2024-04-24 ENCOUNTER — VIRTUAL VISIT (OUTPATIENT)
Dept: ONCOLOGY | Facility: CLINIC | Age: 34
End: 2024-04-24
Attending: STUDENT IN AN ORGANIZED HEALTH CARE EDUCATION/TRAINING PROGRAM
Payer: MEDICAID

## 2024-04-24 VITALS — HEIGHT: 63 IN | BODY MASS INDEX: 23.56 KG/M2

## 2024-04-24 DIAGNOSIS — R07.81 RIB PAIN ON LEFT SIDE: ICD-10-CM

## 2024-04-24 DIAGNOSIS — M79.2 NEUROPATHIC PAIN: ICD-10-CM

## 2024-04-24 DIAGNOSIS — G71.01 DMD (DUCHENNE MUSCULAR DYSTROPHY) (H): Primary | ICD-10-CM

## 2024-04-24 DIAGNOSIS — M79.18 MUSCULOSKELETAL PAIN: ICD-10-CM

## 2024-04-24 DIAGNOSIS — Z93.0 TRACHEOSTOMY PRESENT (H): ICD-10-CM

## 2024-04-24 DIAGNOSIS — Z51.5 ENCOUNTER FOR PALLIATIVE CARE: ICD-10-CM

## 2024-04-24 DIAGNOSIS — Z79.891 ENCOUNTER FOR LONG-TERM USE OF OPIATE ANALGESIC: ICD-10-CM

## 2024-04-24 DIAGNOSIS — M62.838 MUSCLE SPASM: ICD-10-CM

## 2024-04-24 PROCEDURE — 99213 OFFICE O/P EST LOW 20 MIN: CPT | Mod: 95 | Performed by: STUDENT IN AN ORGANIZED HEALTH CARE EDUCATION/TRAINING PROGRAM

## 2024-04-24 ASSESSMENT — PAIN SCALES - GENERAL: PAINLEVEL: NO PAIN (0)

## 2024-04-24 NOTE — PATIENT INSTRUCTIONS
Recommendations:  -Try 5 days of scheduled ibuprofen twice a day.  I am curious to see if treating with an anti-inflammatory helps improve your back pain more, which could indicate a component of arthritis.  -If this is helpful, I am okay with you using ibuprofen more often for pain management.  Just ensure that you are taking it with food.  -Okay to continue gabapentin, Robaxin, and oxycodone as you have been.    Follow up: 3 to 4 months      Reasons to Call    If you are having worsening/uncontrolled symptoms we want you to call!    You or your other physicians make any changes to medications we have prescribed.  -Please call for refills 4-5 days before you will run out of medication.    Important Phone Numbers, including: Refills, scheduling, and general questions     Palliative Care RN: Shyanne Lamas - [phone]  *After hours or on weekends. Will connect you with on call MD. [phone]

## 2024-04-24 NOTE — LETTER
"    4/24/2024         RE: Ernesto Harding  [address]  [facility] [place] [address]        Dear Colleague,    Thank you for referring your patient, Ernesto Harding, to the [facility] CANCER [facility]. Please see a copy of my visit note below.    Palliative Care Progress Note    Patient Name: Ernesto Harding  Primary Provider: Arianne Weeks    Chief Complaint/Patient ID: Ernesto Harding is a 33 year old male with PMHx of duchenne's muscular dystrophy, s/p tracheostomy, ventilator and JT placment. Transferred from [facility] due to his advancing age.     Patient has history of chronic respiratory failure, ventilator dependent, osteoporsis, seasonal allergies and cardiomyopathy (7/19/2019 echocardiogram EF 45-50%).     5 mg oxycodone prn with trach changes and severe periodic episodes of pain from vertebral fractures.     Has 24/7 nursing.    Last Palliative care appointment: 1/10/2024     Reviewed: Yes    Interim History:  Ernesto Harding is a 33 year old male who is seen today for follow up with Palliative Care via billable video visit.      Since last visit, increased PRN dose of gabapentin to 200mg.      Pain up and down. Says his back has been \"dumb\" for a month or two. Seems like might be related to wheelchair, but also if he's in bed for too many days.  Not really changed with the gabapentin or the Robaxin.  Does get some slight benefit from Tylenol.  Finds oxycodone does help.  In general taking 1-2 doses of the oxycodone per week.    Gabapentin dosing is helping with other pains, like in his legs, but not acute pain.    Currently using Robaxin 1000 mg in the morning and midday, and then 500 or 1000 mg before bedtime.  He says this works really well on muscle tension in his neck and legs and makes him feel \"loose to go see\".    Social History:  Living Situation: Lives with his Dad and a cousin. Also has PCA and 24H nursing care. His Mom lives about a mile away.  Actual/Potential " Caregiver(s): See above.  Support System: Multiple friends and family members  Hobbies: Reading, sport, TV, Crescencio  Social History     Tobacco Use     Smoking status: Never     Passive exposure: Never     Smokeless tobacco: Never     Advanced Care Planning: Has completed HCD.  His mother is his HCA. POLST on file stating DNAR/Full Treatment.      Family History- Reviewed in Epic.    Medications- Reviewed in Epic.    Past Medical History- Reviewed in [facility].    Past Surgical History- Reviewed in Epic.    Physical Exam:   Constitutional: Alert young man sitting in a hospital bed in no acute distress.   ENT: Tracheostomy/ventilator.  Speech is slow and nearly normal through the trach.    Respiratory: Respirations unlabored, no cough, speaking full sentences.   MSK: Thin with advanced muscle loss.  Skin:  Skin with no obvious lesions.    Neuro: Face is symmetric.    Psych: Affect is full, pleasant, interactive.    Key Data Reviewed:  None new.    Impression & Recommendations & Counseling:  Ernesto Harding is a 33 year old male with history of Duchenne's Muscular Dystrophy complicated by chronic back, neck, chest wall and upper extremity pain as well as pain during tracheostomy changes.          Pain, MSK and Neuropathic  Muscle spasms  Hx vertebral fractures - Has been having more consistent pain in his back now for the past 1 to 2 months.  Not really helped with gabapentin or Robaxin.  Slight benefit from Tylenol and does improve with oxycodone.  Discussed given his history of multiple vertebral fractures, this could be arthritis, as it is most prominent when he is in the same position for a long time.  -Trial of ibuprofen twice a day for the next 5 days.  If he finds this to be helpful, I am okay with him using ibuprofen ongoing.  If he were to need more than 2 doses per day, would want to consider an alternative NSAID that is more Whitfield 2 specific.  Potentially limited by need for either liquid or pills that can be  crushed and put in his J-tube.  -Continue oxycodone 5 mg 30 minutes before trach changes. Additionally okay to use a 5 mg dose Q4H PRN for severe pain.    -Continue Robaxin 500-1000 mg up to 4 times daily as needed if he has another acute flare of severe muscular pain.  Currently taking 1000 mg in a.m., 1000 mg midday, and 500 or 1000 mg at bedtime.  -Continue gabapentin 100mg in AM and 900mg in evening.  Has additional 200 mg dose of gabapentin as needed for breakthrough pain.  So far this dose has worked well when he has needed it.      Follow up: 3 to 4 months      Video-Visit Details  Video Start Time: 11:25 AM  Video End Time: 11:39 AM    Originating Location (pt. Location): Home     Distant Location (provider location):  Offsite- Personal Home      Platform used for Video Visit: Joe     Total time spent on day of encounter is 22 mins, including reviewing record, review of above studies, above visit with patient, symptomatic discussion as above, including medication adjustments/prescription management, and documentation.     Vicky Colvin DO  Palliative Medicine   [id number]    Some chart documentation performed using Dragon Voice recognition Software. Although reviewed after completion, some words and grammatical errors may remain.      Again, thank you for allowing me to participate in the care of your patient.        Sincerely,        Vicky Colvin DO

## 2024-04-24 NOTE — PROGRESS NOTES
"Palliative Care Progress Note    Patient Name: Ernesto Harding  Primary Provider: Arianne Weeks    Chief Complaint/Patient ID: Ernesto Harding is a 33 year old male with PMHx of duchenne's muscular dystrophy, s/p tracheostomy, ventilator and JT placment. Transferred from [facility] due to his advancing age.     Patient has history of chronic respiratory failure, ventilator dependent, osteoporsis, seasonal allergies and cardiomyopathy (7/19/2019 echocardiogram EF 45-50%).     5 mg oxycodone prn with trach changes and severe periodic episodes of pain from vertebral fractures.     Has 24/7 nursing.    Last Palliative care appointment: 1/10/2024     Reviewed: Yes    Interim History:  Ernesto Harding is a 33 year old male who is seen today for follow up with Palliative Care via billable video visit.      Since last visit, increased PRN dose of gabapentin to 200mg.      Pain up and down. Says his back has been \"dumb\" for a month or two. Seems like might be related to wheelchair, but also if he's in bed for too many days.  Not really changed with the gabapentin or the Robaxin.  Does get some slight benefit from Tylenol.  Finds oxycodone does help.  In general taking 1-2 doses of the oxycodone per week.    Gabapentin dosing is helping with other pains, like in his legs, but not acute pain.    Currently using Robaxin 1000 mg in the morning and midday, and then 500 or 1000 mg before bedtime.  He says this works really well on muscle tension in his neck and legs and makes him feel \"loose to go see\".    Social History:  Living Situation: Lives with his Dad and a cousin. Also has PCA and 24H nursing care. His Mom lives about a mile away.  Actual/Potential Caregiver(s): See above.  Support System: Multiple friends and family members  Hobbies: Reading, sport, TV, Crescencio  Social History     Tobacco Use    Smoking status: Never     Passive exposure: Never    Smokeless tobacco: Never     Advanced Care Planning: Has " completed HCD.  His mother is his HCA. POLST on file stating DNAR/Full Treatment.      Family History- Reviewed in Epic.    Medications- Reviewed in Epic.    Past Medical History- Reviewed in [facility].    Past Surgical History- Reviewed in Epic.    Physical Exam:   Constitutional: Alert young man sitting in a hospital bed in no acute distress.   ENT: Tracheostomy/ventilator.  Speech is slow and nearly normal through the trach.    Respiratory: Respirations unlabored, no cough, speaking full sentences.   MSK: Thin with advanced muscle loss.  Skin:  Skin with no obvious lesions.    Neuro: Face is symmetric.    Psych: Affect is full, pleasant, interactive.    Key Data Reviewed:  None new.    Impression & Recommendations & Counseling:  Ernesto Harding is a 33 year old male with history of Duchenne's Muscular Dystrophy complicated by chronic back, neck, chest wall and upper extremity pain as well as pain during tracheostomy changes.          Pain, MSK and Neuropathic  Muscle spasms  Hx vertebral fractures - Has been having more consistent pain in his back now for the past 1 to 2 months.  Not really helped with gabapentin or Robaxin.  Slight benefit from Tylenol and does improve with oxycodone.  Discussed given his history of multiple vertebral fractures, this could be arthritis, as it is most prominent when he is in the same position for a long time.  -Trial of ibuprofen twice a day for the next 5 days.  If he finds this to be helpful, I am okay with him using ibuprofen ongoing.  If he were to need more than 2 doses per day, would want to consider an alternative NSAID that is more Whitfield 2 specific.  Potentially limited by need for either liquid or pills that can be crushed and put in his J-tube.  -Continue oxycodone 5 mg 30 minutes before trach changes. Additionally okay to use a 5 mg dose Q4H PRN for severe pain.    -Continue Robaxin 500-1000 mg up to 4 times daily as needed if he has another acute flare of severe muscular  pain.  Currently taking 1000 mg in a.m., 1000 mg midday, and 500 or 1000 mg at bedtime.  -Continue gabapentin 100mg in AM and 900mg in evening.  Has additional 200 mg dose of gabapentin as needed for breakthrough pain.  So far this dose has worked well when he has needed it.      Follow up: 3 to 4 months      Video-Visit Details  Video Start Time: 11:25 AM  Video End Time: 11:39 AM    Originating Location (pt. Location): Home     Distant Location (provider location):  Offsite- Personal Home      Platform used for Video Visit: Joe     Total time spent on day of encounter is 22 mins, including reviewing record, review of above studies, above visit with patient, symptomatic discussion as above, including medication adjustments/prescription management, and documentation.     Vicky Colvin,   Palliative Medicine   [facility] [id number]    Some chart documentation performed using Dragon Voice recognition Software. Although reviewed after completion, some words and grammatical errors may remain.

## 2024-04-24 NOTE — LETTER
"    4/24/2024         RE: Ernesto Harding  [address]  [facility] [address]        Dear Colleague,    Thank you for referring your patient, Ernesto Harding, to the [facility] CANCER [facility]. Please see a copy of my visit note below.    Palliative Care Progress Note    Patient Name: Ernesto Harding  Primary Provider: Arianne Weeks    Chief Complaint/Patient ID: Ernesto Harding is a 33 year old male with PMHx of duchenne's muscular dystrophy, s/p tracheostomy, ventilator and JT placment. Transferred from [facility] due to his advancing age.     Patient has history of chronic respiratory failure, ventilator dependent, osteoporsis, seasonal allergies and cardiomyopathy (7/19/2019 echocardiogram EF 45-50%).     5 mg oxycodone prn with trach changes and severe periodic episodes of pain from vertebral fractures.     Has 24/7 nursing.    Last Palliative care appointment: 1/10/2024     Reviewed: Yes    Interim History:  Ernesto Harding is a 33 year old male who is seen today for follow up with Palliative Care via billable video visit.      Since last visit, increased PRN dose of gabapentin to 200mg.      Pain up and down. Says his back has been \"dumb\" for a month or two. Seems like might be related to wheelchair, but also if he's in bed for too many days.  Not really changed with the gabapentin or the Robaxin.  Does get some slight benefit from Tylenol.  Finds oxycodone does help.  In general taking 1-2 doses of the oxycodone per week.    Gabapentin dosing is helping with other pains, like in his legs, but not acute pain.    Currently using Robaxin 1000 mg in the morning and midday, and then 500 or 1000 mg before bedtime.  He says this works really well on muscle tension in his neck and legs and makes him feel \"loose to go see\".    Social History:  Living Situation: Lives with his Dad and a cousin. Also has PCA and 24H nursing care. His Mom lives about a mile away.  Actual/Potential " Caregiver(s): See above.  Support System: Multiple friends and family members  Hobbies: Reading, sport, TV, Crescencio  Social History     Tobacco Use     Smoking status: Never     Passive exposure: Never     Smokeless tobacco: Never     Advanced Care Planning: Has completed HCD.  His mother is his HCA. POLST on file stating DNAR/Full Treatment.      Family History- Reviewed in Epic.    Medications- Reviewed in Epic.    Past Medical History- Reviewed in [facility].    Past Surgical History- Reviewed in Epic.    Physical Exam:   Constitutional: Alert young man sitting in a hospital bed in no acute distress.   ENT: Tracheostomy/ventilator.  Speech is slow and nearly normal through the trach.    Respiratory: Respirations unlabored, no cough, speaking full sentences.   MSK: Thin with advanced muscle loss.  Skin:  Skin with no obvious lesions.    Neuro: Face is symmetric.    Psych: Affect is full, pleasant, interactive.    Key Data Reviewed:  None new.    Impression & Recommendations & Counseling:  Ernesto Harding is a 33 year old male with history of Duchenne's Muscular Dystrophy complicated by chronic back, neck, chest wall and upper extremity pain as well as pain during tracheostomy changes.          Pain, MSK and Neuropathic  Muscle spasms  Hx vertebral fractures - Has been having more consistent pain in his back now for the past 1 to 2 months.  Not really helped with gabapentin or Robaxin.  Slight benefit from Tylenol and does improve with oxycodone.  Discussed given his history of multiple vertebral fractures, this could be arthritis, as it is most prominent when he is in the same position for a long time.  -Trial of ibuprofen twice a day for the next 5 days.  If he finds this to be helpful, I am okay with him using ibuprofen ongoing.  If he were to need more than 2 doses per day, would want to consider an alternative NSAID that is more Whitfield 2 specific.  Potentially limited by need for either liquid or pills that can be  crushed and put in his J-tube.  -Continue oxycodone 5 mg 30 minutes before trach changes. Additionally okay to use a 5 mg dose Q4H PRN for severe pain.    -Continue Robaxin 500-1000 mg up to 4 times daily as needed if he has another acute flare of severe muscular pain.  Currently taking 1000 mg in a.m., 1000 mg midday, and 500 or 1000 mg at bedtime.  -Continue gabapentin 100mg in AM and 900mg in evening.  Has additional 200 mg dose of gabapentin as needed for breakthrough pain.  So far this dose has worked well when he has needed it.      Follow up: 3 to 4 months      Video-Visit Details  Video Start Time: 11:25 AM  Video End Time: 11:39 AM    Originating Location (pt. Location): Home     Distant Location (provider location):  Offsite- Personal Home      Platform used for Video Visit: Joe     Total time spent on day of encounter is 22 mins, including reviewing record, review of above studies, above visit with patient, symptomatic discussion as above, including medication adjustments/prescription management, and documentation.     Vicky Colvin DO  Palliative Medicine   [facility] [place] [id number]    Some chart documentation performed using Dragon Voice recognition Software. Although reviewed after completion, some words and grammatical errors may remain.      Again, thank you for allowing me to participate in the care of your patient.        Sincerely,        Vicky Colvin DO

## 2024-04-24 NOTE — NURSING NOTE
Is the patient currently in the state of [place]? YES    Visit mode:VIDEO    If the visit is dropped, the patient can be reconnected by: VIDEO VISIT: Text to cell phone:   Telephone Information:   Mobile [phone]       Will anyone else be joining the visit? NO  (If patient encounters technical issues they should call [phone]    How would you like to obtain your AVS? MyChart    Are changes needed to the allergy or medication list? No, Pt stated no changes to allergies, and Pt declined med review    Are refills needed on medications prescribed by this physician? NO    Reason for visit: RECHECK (Return Palliative )    Natasha ALEXIS

## 2024-05-20 DIAGNOSIS — G71.01 DMD (DUCHENNE MUSCULAR DYSTROPHY) (H): ICD-10-CM

## 2024-05-20 DIAGNOSIS — Z93.0 TRACHEOSTOMY PRESENT (H): ICD-10-CM

## 2024-05-20 DIAGNOSIS — Z93.4 JEJUNOSTOMY TUBE PRESENT (H): ICD-10-CM

## 2024-05-20 RX ORDER — OXYCODONE HCL 5 MG/5 ML
5 SOLUTION, ORAL ORAL EVERY 4 HOURS PRN
Qty: 40 ML | Refills: 0 | Status: SHIPPED | OUTPATIENT
Start: 2024-05-20 | End: 2024-06-24

## 2024-05-20 NOTE — TELEPHONE ENCOUNTER
Received voicemail from patient's mom requesting refill of oxycodone. Pt had COVID and was taking the oxycodone more frequently than normal. Pt also has a planned trach change this week.     Last refill: 4/19/24  Last office visit: 4/24/24  Scheduled for follow up 7/24/24     Will route request to MD for review.     Reviewed [place]  Report.

## 2024-06-24 DIAGNOSIS — G71.01 DMD (DUCHENNE MUSCULAR DYSTROPHY) (H): ICD-10-CM

## 2024-06-24 DIAGNOSIS — Z93.0 TRACHEOSTOMY PRESENT (H): ICD-10-CM

## 2024-06-24 DIAGNOSIS — Z93.4 JEJUNOSTOMY TUBE PRESENT (H): ICD-10-CM

## 2024-06-24 RX ORDER — OXYCODONE HCL 5 MG/5 ML
5 SOLUTION, ORAL ORAL EVERY 4 HOURS PRN
Qty: 40 ML | Refills: 0 | Status: SHIPPED | OUTPATIENT
Start: 2024-06-24 | End: 2024-07-31

## 2024-06-24 NOTE — TELEPHONE ENCOUNTER
Received voicemail from patient's mom requesting refill of oxycodone.     Last refill: 5/20/24  Last office visit: 4/24/24  Scheduled for follow up 7/24/24     Will route request to MD for review.     Reviewed [place]  Report.

## 2024-07-24 ENCOUNTER — VIRTUAL VISIT (OUTPATIENT)
Dept: ONCOLOGY | Facility: CLINIC | Age: 34
End: 2024-07-24
Attending: STUDENT IN AN ORGANIZED HEALTH CARE EDUCATION/TRAINING PROGRAM
Payer: MEDICAID

## 2024-07-24 VITALS — HEIGHT: 63 IN | BODY MASS INDEX: 23.04 KG/M2 | WEIGHT: 130 LBS

## 2024-07-24 DIAGNOSIS — M79.18 MUSCULOSKELETAL PAIN: ICD-10-CM

## 2024-07-24 DIAGNOSIS — M62.838 MUSCLE SPASM: ICD-10-CM

## 2024-07-24 DIAGNOSIS — G71.01 DMD (DUCHENNE MUSCULAR DYSTROPHY) (H): Primary | ICD-10-CM

## 2024-07-24 DIAGNOSIS — Z79.891 ENCOUNTER FOR LONG-TERM USE OF OPIATE ANALGESIC: ICD-10-CM

## 2024-07-24 DIAGNOSIS — Z93.0 TRACHEOSTOMY PRESENT (H): ICD-10-CM

## 2024-07-24 DIAGNOSIS — R07.81 RIB PAIN ON LEFT SIDE: ICD-10-CM

## 2024-07-24 DIAGNOSIS — M79.2 NEUROPATHIC PAIN: ICD-10-CM

## 2024-07-24 DIAGNOSIS — Z51.5 ENCOUNTER FOR PALLIATIVE CARE: ICD-10-CM

## 2024-07-24 PROCEDURE — 99213 OFFICE O/P EST LOW 20 MIN: CPT | Mod: 95 | Performed by: STUDENT IN AN ORGANIZED HEALTH CARE EDUCATION/TRAINING PROGRAM

## 2024-07-24 ASSESSMENT — PAIN SCALES - GENERAL: PAINLEVEL: NO PAIN (0)

## 2024-07-24 NOTE — LETTER
7/24/2024      Ernesto Harding  [address]  [facility] [place] [address]      Dear Colleague,    Thank you for referring your patient, Ernesto Harding, to the [facility]CER [facility]ENTER [place]. Please see a copy of my visit note below.    Palliative Care Progress Note    Patient Name: Ernesto Hardnig  Primary Provider: Arianne Weeks    Chief Complaint/Patient ID: Ernesto Harding is a 33 year old male with PMHx of duchenne's muscular dystrophy, s/p tracheostomy, ventilator and JT placment. Transferred from [facility] due to his advancing age.     Patient has history of chronic respiratory failure, ventilator dependent, osteoporsis, seasonal allergies and cardiomyopathy (7/19/2019 echocardiogram EF 45-50%).     5 mg oxycodone prn with trach changes and severe periodic episodes of pain from vertebral fractures.     Has 24/7 nursing.    Last Palliative care appointment: 4/24/2024 with me. Trial of ibuprofen for back pain.     Reviewed: Yes    Social History: Living Situation: Lives with his Dad and a cousin. Also has PCA and 24H nursing care. His Mom lives about a mile away.  Actual/Potential Caregiver(s): See above.  Support System: Multiple friends and family members  Hobbies: Reading, sport, TV, Crescencio.    Advanced Care Planning: Has completed HCD.  His mother is his HCA. POLST on file stating DNAR/Full Treatment.      Interim History:  Ernesto Harding is a 33 year old male who is seen today for follow up with Palliative Care via billable video visit.      Since last visit, added ibuprofen on regular basis for pain.    Feels that ibuprofen addition has been very helpful on a day-to-day basis with his back pain.  Generally averaging 400 mg once daily in the morning, however depending on his activity level and if he is going to be up in his chair more, he might take an additional 400 mg dose.  Never more than twice per day.  Medication is administered straight through his J-tube.    Otherwise stable on  "his gabapentin and Robaxin doses and using oxycodone for severe episodes of pain a couple times a week.      Has had a really good last couple of months with no big issues.  Was actually able to get outside 12 straight days so far this summer, which she says he has not done in \"years\".    Physical Exam:   Constitutional: Alert young man sitting in a hospital bed in no acute distress.   ENT: Tracheostomy/ventilator.  Speech is slow and nearly normal through the trach.    Respiratory: Respirations unlabored, no cough, speaking full sentences.   MSK: Thin with advanced muscle loss.  Skin:  Skin with no obvious lesions.    Neuro: Face is symmetric.    Psych: Affect is full, pleasant, interactive.    Key Data Reviewed:  None new.    Impression & Recommendations & Counseling:  Ernesto Harding is a 33 year old male with history of Duchenne's Muscular Dystrophy complicated by chronic back, neck, chest wall and upper extremity pain as well as pain during tracheostomy changes.       Pain, MSK and Neuropathic  Muscle spasms  Hx vertebral fractures - Adding in daily NSAID has been helpful for his back pain.  Overall is in a really good place from a pain management perspective and has been able to be more active this summer.  -Continue ibuprofen 400 mg 1-2x daily as needed.  Pretty consistently taking 1 dose daily. If he were to need more than 2 doses per day, would want to consider an alternative NSAID that is more Whitfield 2 specific.  Potentially limited by need for either liquid or pills that can be crushed and put in his J-tube.  -Continue oxycodone 5 mg 30 minutes before trach changes. Additionally okay to use a 5 mg dose Q4H PRN for severe pain.    -Continue Robaxin 500-1000 mg up to 4 times daily as needed if he has another acute flare of severe muscular pain.  Currently taking 1000 mg in a.m., 1000 mg midday, and 500 or 1000 mg at bedtime.  -Continue gabapentin 100mg in AM and 900mg in evening.  Has additional 200 mg dose of " gabapentin as needed for breakthrough pain.  So far this dose has worked well when he has needed it.      Follow up: 3 to 4 months      Video-Visit Details  Video Start Time: 11:28 AM  Video End Time: 11:37 AM    Originating Location (pt. Location): Home     Distant Location (provider location):  Offsite- Personal Home      Platform used for Video Visit: AmWell     Total time spent on day of encounter is 18 mins, including reviewing record, review of above studies, above visit with patient, symptomatic discussion of primarily pain, including medication adjustments/prescription management, and documentation.       Vicky Colvin DO  Palliative Medicine   [facility] [place] [id number]    Some chart documentation performed using Dragon Voice recognition Software. Although reviewed after completion, some words and grammatical errors may remain.      Again, thank you for allowing me to participate in the care of your patient.        Sincerely,        Vicky Colvin DO

## 2024-07-24 NOTE — PROGRESS NOTES
Palliative Care Progress Note    Patient Name: Ernesto Harding  Primary Provider: Arianne Weeks    Chief Complaint/Patient ID: Ernesto Harding is a 33 year old male with PMHx of duchenne's muscular dystrophy, s/p tracheostomy, ventilator and JT placment. Transferred from [facility] due to his advancing age.     Patient has history of chronic respiratory failure, ventilator dependent, osteoporsis, seasonal allergies and cardiomyopathy (7/19/2019 echocardiogram EF 45-50%).     5 mg oxycodone prn with trach changes and severe periodic episodes of pain from vertebral fractures.     Has 24/7 nursing.    Last Palliative care appointment: 4/24/2024 with me. Trial of ibuprofen for back pain.     Reviewed: Yes    Social History: Living Situation: Lives with his Dad and a cousin. Also has PCA and 24H nursing care. His Mom lives about a mile away.  Actual/Potential Caregiver(s): See above.  Support System: Multiple friends and family members  Hobbies: Reading, sport, TV, Crescencio.    Advanced Care Planning: Has completed HCD.  His mother is his HCA. POLST on file stating DNAR/Full Treatment.      Interim History:  Ernesto Harding is a 33 year old male who is seen today for follow up with Palliative Care via billable video visit.      Since last visit, added ibuprofen on regular basis for pain.    Feels that ibuprofen addition has been very helpful on a day-to-day basis with his back pain.  Generally averaging 400 mg once daily in the morning, however depending on his activity level and if he is going to be up in his chair more, he might take an additional 400 mg dose.  Never more than twice per day.  Medication is administered straight through his J-tube.    Otherwise stable on his gabapentin and Robaxin doses and using oxycodone for severe episodes of pain a couple times a week.      Has had a really good last couple of months with no big issues.  Was actually able to get outside 12 straight days so far this  "summer, which she says he has not done in \"years\".    Physical Exam:   Constitutional: Alert young man sitting in a hospital bed in no acute distress.   ENT: Tracheostomy/ventilator.  Speech is slow and nearly normal through the trach.    Respiratory: Respirations unlabored, no cough, speaking full sentences.   MSK: Thin with advanced muscle loss.  Skin:  Skin with no obvious lesions.    Neuro: Face is symmetric.    Psych: Affect is full, pleasant, interactive.    Key Data Reviewed:  None new.    Impression & Recommendations & Counseling:  Ernesto Harding is a 33 year old male with history of Duchenne's Muscular Dystrophy complicated by chronic back, neck, chest wall and upper extremity pain as well as pain during tracheostomy changes.       Pain, MSK and Neuropathic  Muscle spasms  Hx vertebral fractures - Adding in daily NSAID has been helpful for his back pain.  Overall is in a really good place from a pain management perspective and has been able to be more active this summer.  -Continue ibuprofen 400 mg 1-2x daily as needed.  Pretty consistently taking 1 dose daily. If he were to need more than 2 doses per day, would want to consider an alternative NSAID that is more Whitfield 2 specific.  Potentially limited by need for either liquid or pills that can be crushed and put in his J-tube.  -Continue oxycodone 5 mg 30 minutes before trach changes. Additionally okay to use a 5 mg dose Q4H PRN for severe pain.    -Continue Robaxin 500-1000 mg up to 4 times daily as needed if he has another acute flare of severe muscular pain.  Currently taking 1000 mg in a.m., 1000 mg midday, and 500 or 1000 mg at bedtime.  -Continue gabapentin 100mg in AM and 900mg in evening.  Has additional 200 mg dose of gabapentin as needed for breakthrough pain.  So far this dose has worked well when he has needed it.      Follow up: 3 to 4 months      Video-Visit Details  Video Start Time: 11:28 AM  Video End Time: 11:37 AM    Originating Location " (pt. Location): Home     Distant Location (provider location):  Offsite- Personal Home      Platform used for Video Visit: Joe     Total time spent on day of encounter is 18 mins, including reviewing record, review of above studies, above visit with patient, symptomatic discussion of primarily pain, including medication adjustments/prescription management, and documentation.       Vicky Colvin, DO  Palliative Medicine   [id number]    Some chart documentation performed using Dragon Voice recognition Software. Although reviewed after completion, some words and grammatical errors may remain.

## 2024-07-24 NOTE — LETTER
7/24/2024      Ernesto Harding  [address]  [place] [place] [address]      Dear Colleague,    Thank you for referring your patient, Ernesto Harding, to the [facility] [place]. Please see a copy of my visit note below.    Palliative Care Progress Note    Patient Name: Ernesto Harding  Primary Provider: Arianne Weeks    Chief Complaint/Patient ID: Ernesto Harding is a 33 year old male with PMHx of duchenne's muscular dystrophy, s/p tracheostomy, ventilator and JT placment. Transferred from [facility] due to his advancing age.     Patient has history of chronic respiratory failure, ventilator dependent, osteoporsis, seasonal allergies and cardiomyopathy (7/19/2019 echocardiogram EF 45-50%).     5 mg oxycodone prn with trach changes and severe periodic episodes of pain from vertebral fractures.     Has 24/7 nursing.    Last Palliative care appointment: 4/24/2024 with me. Trial of ibuprofen for back pain.     Reviewed: Yes    Social History: Living Situation: Lives with his Dad and a cousin. Also has PCA and 24H nursing care. His Mom lives about a mile away.  Actual/Potential Caregiver(s): See above.  Support System: Multiple friends and family members  Hobbies: Reading, sport, TV, Crescencio.    Advanced Care Planning: Has completed HCD.  His mother is his HCA. POLST on file stating DNAR/Full Treatment.      Interim History:  Ernesto Harding is a 33 year old male who is seen today for follow up with Palliative Care via billable video visit.      Since last visit, added ibuprofen on regular basis for pain.    Feels that ibuprofen addition has been very helpful on a day-to-day basis with his back pain.  Generally averaging 400 mg once daily in the morning, however depending on his activity level and if he is going to be up in his chair more, he might take an additional 400 mg dose.  Never more than twice per day.  Medication is administered straight through his J-tube.    Otherwise stable on  "his gabapentin and Robaxin doses and using oxycodone for severe episodes of pain a couple times a week.      Has had a really good last couple of months with no big issues.  Was actually able to get outside 12 straight days so far this summer, which she says he has not done in \"years\".    Physical Exam:   Constitutional: Alert young man sitting in a hospital bed in no acute distress.   ENT: Tracheostomy/ventilator.  Speech is slow and nearly normal through the trach.    Respiratory: Respirations unlabored, no cough, speaking full sentences.   MSK: Thin with advanced muscle loss.  Skin:  Skin with no obvious lesions.    Neuro: Face is symmetric.    Psych: Affect is full, pleasant, interactive.    Key Data Reviewed:  None new.    Impression & Recommendations & Counseling:  Ernesto Harding is a 33 year old male with history of Duchenne's Muscular Dystrophy complicated by chronic back, neck, chest wall and upper extremity pain as well as pain during tracheostomy changes.       Pain, MSK and Neuropathic  Muscle spasms  Hx vertebral fractures - Adding in daily NSAID has been helpful for his back pain.  Overall is in a really good place from a pain management perspective and has been able to be more active this summer.  -Continue ibuprofen 400 mg 1-2x daily as needed.  Pretty consistently taking 1 dose daily. If he were to need more than 2 doses per day, would want to consider an alternative NSAID that is more Whitfield 2 specific.  Potentially limited by need for either liquid or pills that can be crushed and put in his J-tube.  -Continue oxycodone 5 mg 30 minutes before trach changes. Additionally okay to use a 5 mg dose Q4H PRN for severe pain.    -Continue Robaxin 500-1000 mg up to 4 times daily as needed if he has another acute flare of severe muscular pain.  Currently taking 1000 mg in a.m., 1000 mg midday, and 500 or 1000 mg at bedtime.  -Continue gabapentin 100mg in AM and 900mg in evening.  Has additional 200 mg dose of " gabapentin as needed for breakthrough pain.  So far this dose has worked well when he has needed it.      Follow up: 3 to 4 months      Video-Visit Details  Video Start Time: 11:28 AM  Video End Time: 11:37 AM    Originating Location (pt. Location): Home     Distant Location (provider location):  Offsite- Personal Home      Platform used for Video Visit: AmWell     Total time spent on day of encounter is 18 mins, including reviewing record, review of above studies, above visit with patient, symptomatic discussion of primarily pain, including medication adjustments/prescription management, and documentation.       Vicky Colvin DO  Palliative Medicine   [id number]    Some chart documentation performed using Dragon Voice recognition Software. Although reviewed after completion, some words and grammatical errors may remain.      Again, thank you for allowing me to participate in the care of your patient.        Sincerely,        Vicky Colvin DO

## 2024-07-24 NOTE — NURSING NOTE
Current [facility] [address] [place] [address]    Is the patient currently in the state of [place]? YES    Visit mode:VIDEO    If the visit is dropped, the patient can be reconnected by: VIDEO VISIT: Send to e-mail at: [email or web address]    Will anyone else be joining the visit? NO  (If patient encounters technical issues they should call [phone])    How would you like to obtain your AVS? MyChart    Are changes needed to the allergy or medication list? Pt declined allergy review and Pt declined med review    Are refills needed on medications prescribed by this physician? NO    Reason for visit: PONCHO ALEXIS

## 2024-07-24 NOTE — PATIENT INSTRUCTIONS
Recommendations:  -Continue medications the same for now.  Please let me know if you start needing more doses of the ibuprofen.  If you are consistently taking 2 or more doses daily, we might want to switch to a different anti-inflammatory medication such as Celebrex.    Follow up: 3 to 4 months      Reasons to Call    If you are having worsening/uncontrolled symptoms we want you to call!    You or your other physicians make any changes to medications we have prescribed.  -Please call for refills 4-5 days before you will run out of medication.    Important Phone Numbers, including: Refills, scheduling, and general questions     Palliative Care RN: Shyanne Lamas : [phone]  *For scheduling needs/follow up visits : [phone]  *After hours or on weekends- Will connect you with on call MD : [phone].

## 2024-07-31 DIAGNOSIS — Z93.4 JEJUNOSTOMY TUBE PRESENT (H): ICD-10-CM

## 2024-07-31 DIAGNOSIS — Z93.0 TRACHEOSTOMY PRESENT (H): ICD-10-CM

## 2024-07-31 DIAGNOSIS — G71.01 DMD (DUCHENNE MUSCULAR DYSTROPHY) (H): ICD-10-CM

## 2024-07-31 RX ORDER — OXYCODONE HCL 5 MG/5 ML
5 SOLUTION, ORAL ORAL EVERY 4 HOURS PRN
Qty: 40 ML | Refills: 0 | Status: SHIPPED | OUTPATIENT
Start: 2024-07-31 | End: 2024-08-30

## 2024-07-31 NOTE — TELEPHONE ENCOUNTER
Received voicemail from patient's mom requesting refill of oxycodone.     Last refill: 6/24/25  Last office visit: 7/24/24  Scheduled for follow up 11/20/24     Will route request to MD for review.     [place]ed [place].

## 2024-08-04 ENCOUNTER — HEALTH MAINTENANCE LETTER (OUTPATIENT)
Age: 34
End: 2024-08-04

## 2024-08-30 DIAGNOSIS — Z93.0 TRACHEOSTOMY PRESENT (H): ICD-10-CM

## 2024-08-30 DIAGNOSIS — G71.01 DMD (DUCHENNE MUSCULAR DYSTROPHY) (H): ICD-10-CM

## 2024-08-30 DIAGNOSIS — Z93.4 JEJUNOSTOMY TUBE PRESENT (H): ICD-10-CM

## 2024-08-30 RX ORDER — OXYCODONE HCL 5 MG/5 ML
5 SOLUTION, ORAL ORAL EVERY 4 HOURS PRN
Qty: 40 ML | Refills: 0 | Status: SHIPPED | OUTPATIENT
Start: 2024-08-30 | End: 2024-09-30

## 2024-08-30 NOTE — TELEPHONE ENCOUNTER
Received voicemail from patient mother requesting refill of oxycodone.     Last refill: 8/2/2024  Last office visit: 7/24/2024  Scheduled for follow up 11/20/2024    Will route request to MD/ for review.     [place]ed [place].

## 2024-09-30 DIAGNOSIS — Z93.0 TRACHEOSTOMY PRESENT (H): ICD-10-CM

## 2024-09-30 DIAGNOSIS — Z93.4 JEJUNOSTOMY TUBE PRESENT (H): ICD-10-CM

## 2024-09-30 DIAGNOSIS — G71.01 DMD (DUCHENNE MUSCULAR DYSTROPHY) (H): ICD-10-CM

## 2024-09-30 RX ORDER — OXYCODONE HCL 5 MG/5 ML
5 SOLUTION, ORAL ORAL EVERY 4 HOURS PRN
Qty: 40 ML | Refills: 0 | Status: SHIPPED | OUTPATIENT
Start: 2024-09-30

## 2024-09-30 NOTE — TELEPHONE ENCOUNTER
Received [email or web address] message from patient requesting refill of oxycodone.     Last refill: 9/2/24  Last office visit: 7/24/24  Scheduled for follow up 11/20/24     Will route request to MD/ for review.     [place]ed [place].

## 2024-10-15 DIAGNOSIS — G71.01 DMD (DUCHENNE MUSCULAR DYSTROPHY) (H): ICD-10-CM

## 2024-10-15 DIAGNOSIS — Z93.0 TRACHEOSTOMY PRESENT (H): ICD-10-CM

## 2024-10-15 DIAGNOSIS — Z93.4 JEJUNOSTOMY TUBE PRESENT (H): ICD-10-CM

## 2024-10-15 RX ORDER — OXYCODONE HCL 5 MG/5 ML
5 SOLUTION, ORAL ORAL EVERY 4 HOURS PRN
Qty: 40 ML | Refills: 0 | Status: SHIPPED | OUTPATIENT
Start: 2024-10-15 | End: 2024-10-18

## 2024-10-15 NOTE — TELEPHONE ENCOUNTER
Received voicemail from patient's mom requesting refill of oxycodone.     Last refill: 9/30/24  Last office visit: 7/24/24  Scheduled for follow up 11/20/24     Will route request to MD/ for review.     Reviewed [place].

## 2024-10-18 ENCOUNTER — VIRTUAL VISIT (OUTPATIENT)
Dept: ONCOLOGY | Facility: CLINIC | Age: 34
End: 2024-10-18
Attending: STUDENT IN AN ORGANIZED HEALTH CARE EDUCATION/TRAINING PROGRAM
Payer: MEDICAID

## 2024-10-18 VITALS — WEIGHT: 130 LBS | BODY MASS INDEX: 23.92 KG/M2 | HEIGHT: 62 IN

## 2024-10-18 DIAGNOSIS — M79.2 NEUROPATHIC PAIN: ICD-10-CM

## 2024-10-18 DIAGNOSIS — Z51.5 ENCOUNTER FOR PALLIATIVE CARE: ICD-10-CM

## 2024-10-18 DIAGNOSIS — Z79.891 ENCOUNTER FOR LONG-TERM USE OF OPIATE ANALGESIC: ICD-10-CM

## 2024-10-18 DIAGNOSIS — J95.09 OTHER TRACHEOSTOMY COMPLICATION (H): ICD-10-CM

## 2024-10-18 DIAGNOSIS — Z93.4 JEJUNOSTOMY TUBE PRESENT (H): ICD-10-CM

## 2024-10-18 DIAGNOSIS — G71.01 DMD (DUCHENNE MUSCULAR DYSTROPHY) (H): Primary | ICD-10-CM

## 2024-10-18 DIAGNOSIS — M62.838 MUSCLE SPASM: ICD-10-CM

## 2024-10-18 DIAGNOSIS — M79.18 MUSCULOSKELETAL PAIN: ICD-10-CM

## 2024-10-18 PROCEDURE — 99214 OFFICE O/P EST MOD 30 MIN: CPT | Mod: 95 | Performed by: STUDENT IN AN ORGANIZED HEALTH CARE EDUCATION/TRAINING PROGRAM

## 2024-10-18 RX ORDER — OXYCODONE HCL 5 MG/5 ML
5 SOLUTION, ORAL ORAL EVERY 4 HOURS PRN
Qty: 40 ML | Refills: 0 | Status: SHIPPED | OUTPATIENT
Start: 2024-10-18

## 2024-10-18 ASSESSMENT — PAIN SCALES - GENERAL: PAINLEVEL: NO PAIN (0)

## 2024-10-18 NOTE — LETTER
10/18/2024      Ernesto Harding  [address]  [place] [facility] [place] [facility] [address]      Dear Colleague,    Thank you for referring your patient, Ernesto Harding, to the [facility] [place]. Please see a copy of my visit note below.    Palliative Care Progress Note    Patient Name: Ernesto Harding  Primary Provider: Arianne Weeks    Chief Complaint/Patient ID: Ernesto Harding is a 33 year old male with PMHx of duchenne's muscular dystrophy, s/p tracheostomy, ventilator and JT placment. Transferred from [facility] due to his advancing age.     Patient has history of chronic respiratory failure, ventilator dependent, osteoporsis, seasonal allergies and cardiomyopathy (7/19/2019 echocardiogram EF 45-50%).     5 mg oxycodone prn with trach changes and severe periodic episodes of pain from vertebral fractures.     Has 24/7 nursing.    Last Palliative care appointment: 7/24/2024 with me. Trial of ibuprofen for back pain.     Reviewed: Yes    Social History: Living Situation: Lives with his Dad and a cousin. Also has PCA and 24H nursing care. His Mom lives about a mile away.  Actual/Potential Caregiver(s): See above.  Support System: Multiple friends and family members  Hobbies: Reading, sport, TV, Crescencio.    Advanced Care Planning: Has completed HCD.  His mother is his HCA. POLST on file stating DNAR/Full Treatment.      Interim History:  Ernesto Harding is a 33 year old male who is seen today for follow up with Palliative Care via billable video visit.      Somewhat acute visit scheduled due to increased pain related to his tracheostomy.    He notes that [employer] stopped making the trach he was using, however he did not find out this was happening until the supply ran out.  He has been trying a different trach for about the last month, however it puts pressure on different parts of his trachea.  He says this is because more pain locally, however it also has caused significant spasms in his back  every time the cough is inflated or deflated.  They just got word yesterday that there is a potential option that similar to his previous trach, however it will not be available until sometime next week.    In the meantime due to this discomfort, he has been using 1 dose of oxycodone daily.  This has been allowing him to get 4 to 6 hours of essentially no discomfort.  He is hesitant to do more than this, as he does not want to risk for constipation.    Does not find the Robaxin has really been affecting the new or back spasm.  He did increase to 6 tablets/day (1000 mg 3 times daily).    Lower back ache is essentially gone now with the scheduled ibuprofen.  Generally averaging 400 mg once daily in the morning, however depending on his activity level and if he is going to be up in his chair more, he might take an additional 400 mg dose.  Never more than twice per day.  Medication is administered straight through his J-tube.      Physical Exam:   Constitutional: Alert young man sitting up in his chair in no acute distress.   ENT: Tracheostomy/ventilator.  Speech is slow and nearly normal through the trach.    Respiratory: Respirations unlabored, no cough, speaking full sentences.   MSK: Thin with advanced muscle loss.  Skin:  Skin with no obvious lesions.    Neuro: Face is symmetric.    Psych: Affect is full, pleasant, interactive.    Key Data Reviewed:  None new.    Impression & Recommendations & Counseling:  Ernesto Harding is a 33 year old male with history of Duchenne's Muscular Dystrophy complicated by chronic back, neck, chest wall and upper extremity pain as well as pain during tracheostomy changes.       Pain, MSK and Neuropathic  Muscle spasms  Hx vertebral fractures - Newer pain issues related to tracheostomy change.  Hopefully next week he will be able to get the different trach that is similar to the trach he used for years.  In the meantime, we did discuss some additional changes to his regimen.  -Continue  ibuprofen 400 mg 1-2x daily as needed.  Pretty consistently taking 1 dose daily, but suggested he could try a second dose if it is helpful for this more recent pain.   -Okay to continue oxycodone 5 mg daily right now to suppress the pain from current trach.  I did send another refill to have available.  -Suggested that he could try increasing Robaxin up to 1500 mg at 2 of his doses (maximum of 8 tablets/day based on current prescription), as this might be more helpful for the severe back spasm from the trach.  -Continue gabapentin 100mg in AM and 900mg in evening.  Has additional 200 mg dose of gabapentin as needed for breakthrough pain.  So far this dose has worked well when he has needed it.      Follow up: 3 to 4 months      Video-Visit Details  Video Start Time: 11:28 AM  Video End Time: 11:41 AM    Originating Location (pt. Location): Home     Distant Location (provider location):  Offsite- Personal Home     Platform used for Video Visit: HighFive Mobile     Total time spent on day of encounter is 24 mins, including reviewing record, review of above studies, above visit with patient, symptomatic discussion as above, including medication adjustments/prescription management, and documentation.         Vicky Colvin DO  Palliative Medicine   [facility] ID [id number]    Some chart documentation performed using Dragon Voice recognition Software. Although reviewed after completion, some words and grammatical errors may remain.      Again, thank you for allowing me to participate in the care of your patient.        Sincerely,        Vicky Colvin DO

## 2024-10-18 NOTE — LETTER
10/18/2024      Ernesto Harding  [address]  [place] [facility] [place] [address]      Dear Colleague,    Thank you for referring your patient, Ernesto Harding, to the [facility] [place]. Please see a copy of my visit note below.    Palliative Care Progress Note    Patient Name: Ernesto Harding  Primary Provider: Arianne Weeks    Chief Complaint/Patient ID: Ernesto Harding is a 33 year old male with PMHx of duchenne's muscular dystrophy, s/p tracheostomy, ventilator and JT placment. Transferred from [facility] due to his advancing age.     Patient has history of chronic respiratory failure, ventilator dependent, osteoporsis, seasonal allergies and cardiomyopathy (7/19/2019 echocardiogram EF 45-50%).     5 mg oxycodone prn with trach changes and severe periodic episodes of pain from vertebral fractures.     Has 24/7 nursing.    Last Palliative care appointment: 7/24/2024 with me. Trial of ibuprofen for back pain.     Reviewed: Yes    Social History: Living Situation: Lives with his Dad and a cousin. Also has PCA and 24H nursing care. His Mom lives about a mile away.  Actual/Potential Caregiver(s): See above.  Support System: Multiple friends and family members  Hobbies: Reading, sport, TV, Crescencio.    Advanced Care Planning: Has completed HCD.  His mother is his HCA. POLST on file stating DNAR/Full Treatment.      Interim History:  Ernesto Harding is a 33 year old male who is seen today for follow up with Palliative Care via billable video visit.      Somewhat acute visit scheduled due to increased pain related to his tracheostomy.    He notes that [employer] stopped making the trach he was using, however he did not find out this was happening until the supply ran out.  He has been trying a different trach for about the last month, however it puts pressure on different parts of his trachea.  He says this is because more pain locally, however it also has caused significant spasms in his back  every time the cough is inflated or deflated.  They just got word yesterday that there is a potential option that similar to his previous trach, however it will not be available until sometime next week.    In the meantime due to this discomfort, he has been using 1 dose of oxycodone daily.  This has been allowing him to get 4 to 6 hours of essentially no discomfort.  He is hesitant to do more than this, as he does not want to risk for constipation.    Does not find the Robaxin has really been affecting the new or back spasm.  He did increase to 6 tablets/day (1000 mg 3 times daily).    Lower back ache is essentially gone now with the scheduled ibuprofen.  Generally averaging 400 mg once daily in the morning, however depending on his activity level and if he is going to be up in his chair more, he might take an additional 400 mg dose.  Never more than twice per day.  Medication is administered straight through his J-tube.      Physical Exam:   Constitutional: Alert young man sitting up in his chair in no acute distress.   ENT: Tracheostomy/ventilator.  Speech is slow and nearly normal through the trach.    Respiratory: Respirations unlabored, no cough, speaking full sentences.   MSK: Thin with advanced muscle loss.  Skin:  Skin with no obvious lesions.    Neuro: Face is symmetric.    Psych: Affect is full, pleasant, interactive.    Key Data Reviewed:  None new.    Impression & Recommendations & Counseling:  Ernesto Harding is a 33 year old male with history of Duchenne's Muscular Dystrophy complicated by chronic back, neck, chest wall and upper extremity pain as well as pain during tracheostomy changes.       Pain, MSK and Neuropathic  Muscle spasms  Hx vertebral fractures - Newer pain issues related to tracheostomy change.  Hopefully next week he will be able to get the different trach that is similar to the trach he used for years.  In the meantime, we did discuss some additional changes to his regimen.  -Continue  ibuprofen 400 mg 1-2x daily as needed.  Pretty consistently taking 1 dose daily, but suggested he could try a second dose if it is helpful for this more recent pain.   -Okay to continue oxycodone 5 mg daily right now to suppress the pain from current trach.  I did send another refill to have available.  -Suggested that he could try increasing Robaxin up to 1500 mg at 2 of his doses (maximum of 8 tablets/day based on current prescription), as this might be more helpful for the severe back spasm from the trach.  -Continue gabapentin 100mg in AM and 900mg in evening.  Has additional 200 mg dose of gabapentin as needed for breakthrough pain.  So far this dose has worked well when he has needed it.      Follow up: 3 to 4 months      Video-Visit Details  Video Start Time: 11:28 AM  Video End Time: 11:41 AM    Originating Location (pt. Location): Home     Distant Location (provider location):  Offsite- Personal Home     Platform used for Video Visit: LeapSky Wireless     Total time spent on day of encounter is 24 mins, including reviewing record, review of above studies, above visit with patient, symptomatic discussion as above, including medication adjustments/prescription management, and documentation.         Vicky Colvin DO  Palliative Medicine   [facility] ID [id number]    Some chart documentation performed using Dragon Voice recognition Software. Although reviewed after completion, some words and grammatical errors may remain.      Again, thank you for allowing me to participate in the care of your patient.        Sincerely,        Vicky Colvin DO

## 2024-10-18 NOTE — NURSING NOTE
Current patient location: [address] [place] [address]    Is the patient currently in the state of [place]? YES    Visit mode:VIDEO    If the visit is dropped, the patient can be reconnected by: VIDEO VISIT: Send to e-mail at: [email or web address]    Will anyone else be joining the visit? NO  (If patient encounters technical issues they should call [phone]    Are changes needed to the allergy or medication list? No    Are refills needed on medications prescribed by this physician? NO    Rooming Documentation:  unable    Reason for visit: PONCHO ALEXIS

## 2024-10-18 NOTE — PATIENT INSTRUCTIONS
Recommendations:  -Okay to continue oxycodone 5 mg daily right now to suppress the pain from current trach.  I did send another refill to have available.  I am okay with you taking a second dose on a day if pain is really bad, however I understand the risks of constipation are pretty bad.  -Suggested you could try increasing the Robaxin up to 3 tablets at 2 of your doses if you do find it helpful for the more recent back spasm.  -Would be okay to add in a second dose of ibuprofen if this is also helpful for that pain.  -Continue gabapentin the same.  -Will plan to cancel the visit in November and schedule something in February, but please let me know if anything changes with your pain in the meantime.    Follow up: Between 3 to 4 months      Reasons to Call    If you are having worsening/uncontrolled symptoms we want you to call!    You or your other physicians make any changes to medications we have prescribed.  -Please call for refills 4-5 days before you will run out of medication.    Important Phone Numbers, including: Refills, scheduling, and general questions     Palliative Care RN: Shyanne Lamas : [phone]  *For scheduling needs/follow up visits : [phone]  *After hours or on weekends- Will connect you with on call MD : [phone].

## 2024-10-18 NOTE — PROGRESS NOTES
Palliative Care Progress Note    Patient Name: Ernesto Hardign  Primary Provider: Arianne Weeks    Chief Complaint/Patient ID: Ernesto Harding is a 33 year old male with PMHx of duchenne's muscular dystrophy, s/p tracheostomy, ventilator and JT placment. Transferred from [facility] due to his advancing age.     Patient has history of chronic respiratory failure, ventilator dependent, osteoporsis, seasonal allergies and cardiomyopathy (7/19/2019 echocardiogram EF 45-50%).     5 mg oxycodone prn with trach changes and severe periodic episodes of pain from vertebral fractures.     Has 24/7 nursing.    Last Palliative care appointment: 7/24/2024 with me. Trial of ibuprofen for back pain.     Reviewed: Yes    Social History: Living Situation: Lives with his Dad and a cousin. Also has PCA and 24H nursing care. His Mom lives about a mile away.  Actual/Potential Caregiver(s): See above.  Support System: Multiple friends and family members  Hobbies: Reading, sport, TV, Crescencio.    Advanced Care Planning: Has completed HCD.  His mother is his HCA. POLST on file stating DNAR/Full Treatment.      Interim History:  Ernesto Harding is a 33 year old male who is seen today for follow up with Palliative Care via billable video visit.      Somewhat acute visit scheduled due to increased pain related to his tracheostomy.    He notes that [employer] stopped making the trach he was using, however he did not find out this was happening until the supply ran out.  He has been trying a different trach for about the last month, however it puts pressure on different parts of his trachea.  He says this is because more pain locally, however it also has caused significant spasms in his back every time the cough is inflated or deflated.  They just got word yesterday that there is a potential option that similar to his previous trach, however it will not be available until sometime next week.    In the meantime due to this  discomfort, he has been using 1 dose of oxycodone daily.  This has been allowing him to get 4 to 6 hours of essentially no discomfort.  He is hesitant to do more than this, as he does not want to risk for constipation.    Does not find the Robaxin has really been affecting the new or back spasm.  He did increase to 6 tablets/day (1000 mg 3 times daily).    Lower back ache is essentially gone now with the scheduled ibuprofen.  Generally averaging 400 mg once daily in the morning, however depending on his activity level and if he is going to be up in his chair more, he might take an additional 400 mg dose.  Never more than twice per day.  Medication is administered straight through his J-tube.      Physical Exam:   Constitutional: Alert young man sitting up in his chair in no acute distress.   ENT: Tracheostomy/ventilator.  Speech is slow and nearly normal through the trach.    Respiratory: Respirations unlabored, no cough, speaking full sentences.   MSK: Thin with advanced muscle loss.  Skin:  Skin with no obvious lesions.    Neuro: Face is symmetric.    Psych: Affect is full, pleasant, interactive.    Key Data Reviewed:  None new.    Impression & Recommendations & Counseling:  Ernesto Harding is a 33 year old male with history of Duchenne's Muscular Dystrophy complicated by chronic back, neck, chest wall and upper extremity pain as well as pain during tracheostomy changes.       Pain, MSK and Neuropathic  Muscle spasms  Hx vertebral fractures - Newer pain issues related to tracheostomy change.  Hopefully next week he will be able to get the different trach that is similar to the trach he used for years.  In the meantime, we did discuss some additional changes to his regimen.  -Continue ibuprofen 400 mg 1-2x daily as needed.  Pretty consistently taking 1 dose daily, but suggested he could try a second dose if it is helpful for this more recent pain.   -Okay to continue oxycodone 5 mg daily right now to suppress the  pain from current trach.  I did send another refill to have available.  -Suggested that he could try increasing Robaxin up to 1500 mg at 2 of his doses (maximum of 8 tablets/day based on current prescription), as this might be more helpful for the severe back spasm from the trach.  -Continue gabapentin 100mg in AM and 900mg in evening.  Has additional 200 mg dose of gabapentin as needed for breakthrough pain.  So far this dose has worked well when he has needed it.      Follow up: 3 to 4 months      Video-Visit Details  Video Start Time: 11:28 AM  Video End Time: 11:41 AM    Originating Location (pt. Location): Home     Distant Location (provider location):  Offsite- Personal Home     Platform used for Video Visit: Joe     Total time spent on day of encounter is 24 mins, including reviewing record, review of above studies, above visit with patient, symptomatic discussion as above, including medication adjustments/prescription management, and documentation.         Vicky Colvin, DO  Palliative Medicine   [id number]    Some chart documentation performed using Dragon Voice recognition Software. Although reviewed after completion, some words and grammatical errors may remain.

## 2024-11-18 DIAGNOSIS — J95.09 OTHER TRACHEOSTOMY COMPLICATION (H): ICD-10-CM

## 2024-11-18 DIAGNOSIS — Z93.4 JEJUNOSTOMY TUBE PRESENT (H): ICD-10-CM

## 2024-11-18 DIAGNOSIS — G71.01 DMD (DUCHENNE MUSCULAR DYSTROPHY) (H): ICD-10-CM

## 2024-11-18 RX ORDER — OXYCODONE HCL 5 MG/5 ML
5 SOLUTION, ORAL ORAL EVERY 4 HOURS PRN
Qty: 40 ML | Refills: 0 | Status: SHIPPED | OUTPATIENT
Start: 2024-11-18

## 2024-11-18 NOTE — TELEPHONE ENCOUNTER
Received voicemail from patient's mom requesting refill of oxycodone.     Last refill: 10/18/24  Last office visit: 10/18/24  Scheduled for follow up 2/19/25     Will route request to MD/ for review.     Reviewed [place]  Report.

## 2024-12-04 DIAGNOSIS — G71.01 DMD (DUCHENNE MUSCULAR DYSTROPHY) (H): ICD-10-CM

## 2024-12-04 DIAGNOSIS — J95.09 OTHER TRACHEOSTOMY COMPLICATION (H): ICD-10-CM

## 2024-12-04 DIAGNOSIS — Z93.4 JEJUNOSTOMY TUBE PRESENT (H): ICD-10-CM

## 2024-12-04 RX ORDER — OXYCODONE HCL 5 MG/5 ML
5 SOLUTION, ORAL ORAL EVERY 4 HOURS PRN
Qty: 40 ML | Refills: 0 | Status: SHIPPED | OUTPATIENT
Start: 2024-12-04

## 2024-12-04 NOTE — TELEPHONE ENCOUNTER
Received voicemail from patient's mom requesting refill of oxycodone.     Last refill: 11/18/24  Last office visit: 10/18/24  Scheduled for follow up 2/19/25     Will route request to MD/ for review.     Reviewed [place]  Report.

## 2025-01-14 DIAGNOSIS — J95.09 OTHER TRACHEOSTOMY COMPLICATION (H): ICD-10-CM

## 2025-01-14 DIAGNOSIS — Z93.4 JEJUNOSTOMY TUBE PRESENT (H): ICD-10-CM

## 2025-01-14 DIAGNOSIS — G71.01 DMD (DUCHENNE MUSCULAR DYSTROPHY) (H): ICD-10-CM

## 2025-01-14 RX ORDER — OXYCODONE HCL 5 MG/5 ML
5 SOLUTION, ORAL ORAL EVERY 4 HOURS PRN
Qty: 60 ML | Refills: 0 | Status: SHIPPED | OUTPATIENT
Start: 2025-01-14

## 2025-01-14 NOTE — TELEPHONE ENCOUNTER
Received voicemail from patient's mother requesting refill of oxycodone.     Last refill: 12/28/24  Last office visit: 10/18/24  Scheduled for follow up 2/19/25     Will route request to MD/ for review.     Reviewed [place]  Report.

## 2025-02-05 DIAGNOSIS — Z93.4 JEJUNOSTOMY TUBE PRESENT (H): ICD-10-CM

## 2025-02-05 DIAGNOSIS — J95.09 OTHER TRACHEOSTOMY COMPLICATION (H): ICD-10-CM

## 2025-02-05 DIAGNOSIS — G71.01 DMD (DUCHENNE MUSCULAR DYSTROPHY) (H): ICD-10-CM

## 2025-02-05 RX ORDER — OXYCODONE HCL 5 MG/5 ML
5 SOLUTION, ORAL ORAL EVERY 4 HOURS PRN
Qty: 60 ML | Refills: 0 | Status: SHIPPED | OUTPATIENT
Start: 2025-02-05

## 2025-02-05 NOTE — TELEPHONE ENCOUNTER
Received telephone call from patient's mother requesting refill of oxycodone.     Last refill: 1/14/25  Last office visit: 10/18/24  Scheduled for follow up 2/19/25     Will route request to MD/ for review.     Reviewed [place]  Report.

## 2025-02-17 DIAGNOSIS — R07.81 RIB PAIN: ICD-10-CM

## 2025-02-17 DIAGNOSIS — M79.2 NEUROPATHIC PAIN: ICD-10-CM

## 2025-02-17 RX ORDER — GABAPENTIN 300 MG/1
900 CAPSULE ORAL AT BEDTIME
Qty: 90 CAPSULE | Refills: 11 | Status: SHIPPED | OUTPATIENT
Start: 2025-02-17

## 2025-02-17 NOTE — TELEPHONE ENCOUNTER
Received [employer] message from patient requesting refill of gabapentin. Pt believes he's having some worsening leg pain due to change in brand of gabapentin. He is requesting to have a new RX sent to a local pharmacy to see if symptoms improve.    Last refill: 2/3/25  Last office visit: 10/18/24  Scheduled for follow up 2/19/25     Will route request to MD/ for review.     Reviewed [place]  Report.

## 2025-02-19 DIAGNOSIS — G71.01 DMD (DUCHENNE MUSCULAR DYSTROPHY) (H): ICD-10-CM

## 2025-02-19 DIAGNOSIS — J95.09 OTHER TRACHEOSTOMY COMPLICATION (H): ICD-10-CM

## 2025-02-19 DIAGNOSIS — Z93.4 JEJUNOSTOMY TUBE PRESENT (H): ICD-10-CM

## 2025-02-19 RX ORDER — OXYCODONE HCL 5 MG/5 ML
5 SOLUTION, ORAL ORAL EVERY 4 HOURS PRN
Qty: 60 ML | Refills: 0 | Status: SHIPPED | OUTPATIENT
Start: 2025-02-19

## 2025-02-19 NOTE — TELEPHONE ENCOUNTER
Received [employer] message from patient requesting refill of oxycodone.     Last refill: 2/6/25  Last office visit: 10/18/24  Scheduled for follow up 2/21/25     Will route request to MD/ for review.     Reviewed [place]  Report.

## 2025-03-05 DIAGNOSIS — G71.01 DMD (DUCHENNE MUSCULAR DYSTROPHY) (H): ICD-10-CM

## 2025-03-05 DIAGNOSIS — M79.18 MUSCULOSKELETAL PAIN: ICD-10-CM

## 2025-03-05 RX ORDER — GABAPENTIN 100 MG/1
200 CAPSULE ORAL 3 TIMES DAILY PRN
COMMUNITY
Start: 2025-03-05

## 2025-03-06 DIAGNOSIS — G71.01 DMD (DUCHENNE MUSCULAR DYSTROPHY) (H): ICD-10-CM

## 2025-03-06 DIAGNOSIS — J95.09 OTHER TRACHEOSTOMY COMPLICATION (H): ICD-10-CM

## 2025-03-06 DIAGNOSIS — Z93.4 JEJUNOSTOMY TUBE PRESENT (H): ICD-10-CM

## 2025-03-06 RX ORDER — OXYCODONE HCL 5 MG/5 ML
5 SOLUTION, ORAL ORAL EVERY 4 HOURS PRN
Qty: 60 ML | Refills: 0 | Status: SHIPPED | OUTPATIENT
Start: 2025-03-06

## 2025-03-06 NOTE — TELEPHONE ENCOUNTER
Received [employer] message from patient requesting refill of oxycodone.     Last refill: 2/19/25  Last office visit: 2/21/25  Scheduled for follow up 5/7/25     Will route request to MD/ for review.     Reviewed [place]  Report.

## 2025-03-24 DIAGNOSIS — Z93.4 JEJUNOSTOMY TUBE PRESENT (H): ICD-10-CM

## 2025-03-24 DIAGNOSIS — G71.01 DMD (DUCHENNE MUSCULAR DYSTROPHY) (H): ICD-10-CM

## 2025-03-24 DIAGNOSIS — J95.09 OTHER TRACHEOSTOMY COMPLICATION (H): ICD-10-CM

## 2025-03-24 RX ORDER — OXYCODONE HCL 5 MG/5 ML
5 SOLUTION, ORAL ORAL EVERY 4 HOURS PRN
Qty: 60 ML | Refills: 0 | Status: SHIPPED | OUTPATIENT
Start: 2025-03-24

## 2025-03-24 NOTE — TELEPHONE ENCOUNTER
Received telephone call from patient's mother requesting refill of oxycodone.     Last refill: 3/7/25  Last office visit: 2/21/25  Scheduled for follow up 5/7/25     Will route request to MD/ for review.     Reviewed [place]  Report.

## 2025-04-03 DIAGNOSIS — J95.09 OTHER TRACHEOSTOMY COMPLICATION (H): ICD-10-CM

## 2025-04-03 DIAGNOSIS — Z93.4 JEJUNOSTOMY TUBE PRESENT (H): ICD-10-CM

## 2025-04-03 DIAGNOSIS — G71.01 DMD (DUCHENNE MUSCULAR DYSTROPHY) (H): ICD-10-CM

## 2025-04-03 RX ORDER — OXYCODONE HCL 5 MG/5 ML
5 SOLUTION, ORAL ORAL EVERY 4 HOURS PRN
Qty: 60 ML | Refills: 0 | Status: SHIPPED | OUTPATIENT
Start: 2025-04-03

## 2025-04-03 NOTE — TELEPHONE ENCOUNTER
Received telephone call from patient's mother requesting refill of oxycodone solution.     Last refill: 3/24/2025  Last office visit: 2/21/2025  Scheduled for follow up 4/15/2025     Will route request to MD/ for review.     Reviewed [place]  Report.

## 2025-04-15 ENCOUNTER — VIRTUAL VISIT (OUTPATIENT)
Dept: PALLIATIVE CARE | Facility: CLINIC | Age: 35
End: 2025-04-15
Attending: STUDENT IN AN ORGANIZED HEALTH CARE EDUCATION/TRAINING PROGRAM
Payer: MEDICAID

## 2025-04-15 VITALS
BODY MASS INDEX: 25.69 KG/M2 | WEIGHT: 145 LBS | HEART RATE: 63 BPM | SYSTOLIC BLOOD PRESSURE: 100 MMHG | HEIGHT: 63 IN | DIASTOLIC BLOOD PRESSURE: 69 MMHG

## 2025-04-15 DIAGNOSIS — Z51.5 ENCOUNTER FOR PALLIATIVE CARE: ICD-10-CM

## 2025-04-15 DIAGNOSIS — R07.81 RIB PAIN ON LEFT SIDE: ICD-10-CM

## 2025-04-15 DIAGNOSIS — M79.18 MUSCULOSKELETAL PAIN: ICD-10-CM

## 2025-04-15 DIAGNOSIS — R23.8 SKIN BREAKDOWN: ICD-10-CM

## 2025-04-15 DIAGNOSIS — G89.4 CHRONIC PAIN SYNDROME: ICD-10-CM

## 2025-04-15 DIAGNOSIS — G71.01 DMD (DUCHENNE MUSCULAR DYSTROPHY) (H): Primary | ICD-10-CM

## 2025-04-15 DIAGNOSIS — M62.838 MUSCLE SPASM: ICD-10-CM

## 2025-04-15 RX ORDER — METHOCARBAMOL 500 MG/1
500-1000 TABLET, FILM COATED ORAL 4 TIMES DAILY PRN
Qty: 180 TABLET | Refills: 5 | Status: SHIPPED | OUTPATIENT
Start: 2025-04-15

## 2025-04-15 RX ORDER — BUPRENORPHINE 5 UG/H
1 PATCH TRANSDERMAL
Qty: 4 PATCH | Refills: 1 | Status: SHIPPED | OUTPATIENT
Start: 2025-04-15

## 2025-04-15 ASSESSMENT — PAIN SCALES - GENERAL: PAINLEVEL_OUTOF10: MILD PAIN (1)

## 2025-04-15 NOTE — PATIENT INSTRUCTIONS
Recommendations:  -New prescription sent for Butrans 5 mcg patch.    -Okay to continue oxycodone 5 mg daily as needed.  -Continue ibuprofen 400 mg 1-2x daily as needed.    -Continue gabapentin 200mg in AM and 900 mg in the evening.  -Continue Robaxin 500 to 1000 g up to 4 times daily as needed.  For the skin breakdown, discussed how neomycin can contribute to allergic tinnitus, and so if the skin does not seem to be improving, would recommend either an antibiotic cream that does not contain this, such as bacitracin, or moving to plain Vaseline.      Administration of Butrans    Butrans is for transdermal use (on intact skin) only.   Each Butrans patch is intended to be worn for 7 days  Do not to use Butrans if the pouch seal is broken or the patch is cut, damaged, or changed in any way  Do not to cut the Butrans patch  Apply immediately after removal from the individually sealed pouch  Apply Butrans to the upper outer arm, upper chest, upper back or the side of the chest. These 4 sites (each present on both sides of the body) provide 8 possible application sites. Rotate Butrans among the 8 described skin sites. After Butrans removal, wait a minimum of 21 days before reapplying to the same skin site          For the use of 2 patches, remove your current patch, and apply the 2 new patches adjacent to one another at a different application site  Apply Butrans to a hairless or nearly hairless skin site.   If none are available, the hair at the site should be clipped, not shaven.   Do not apply Butrans to irritated skin.   If the application site must be cleaned, clean the site with water only. Do not use soaps, alcohol, oils, lotions, or abrasive devices.   Allow the skin to dry before applying Butrans  It is OK to bathe or shower with the patch on. Lightly pat it dry.   If problems with adhesion of Butrans occur, the edges may be taped with first aid tape.   If problems with lack of adhesion continue, the patch may be  covered with waterproof or semipermeable adhesive dressings (Tegaderm) suitable for 7 days of wear.   If Butrans falls off during the 7-day dosing interval, dispose of the transdermal system properly and place a new Butrans patch on at a different skin site.   Dispose of used patches by folding the adhesive side of the patch to itself, then flushing the patch down the toilet immediately upon removal.   Unused patches should be removed from their pouches, the protective liners removed, the patches folded so that the adhesive side of the patch adheres to itself, and immediately flushed down the toilet  Dispose of any patches remaining from a prescription as soon as they are no longer needed    Butrans is supplied in cartons containing 4 individually packaged systems and   a pouch containing 4 Patch-Disposal Units           Follow up: Already scheduled for May 7.      *Please do not make any changes to medications that we prescribe, including pain medicines, without talking to our team*    Reasons to Call    If you are having worsening/uncontrolled symptoms we want you to call!    You or your other physicians make any changes to medications we have prescribed.  -Please call for refills 4-5 days before you will run out of medication.    Important Phone Numbers, including: Refills, scheduling, and general questions     Palliative Care RN: Shyanne Lamas : [phone]  *For scheduling needs/follow up visits : [phone]  *After hours or on weekends- Will connect you with on call MD : [phone].
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
## 2025-04-15 NOTE — NURSING NOTE
Current patient location: [address] [facility] [address]    Is the patient currently in the state of [place]? YES    Visit mode: VIDEO    If the visit is dropped, the patient can be reconnected by:VIDEO VISIT: Text to cell phone:   Telephone Information:   Mobile [phone]       Will anyone else be joining the visit? No  (If patient encounters technical issues they should call [phone]    Are changes needed to the allergy or medication list?  Allergies reviewed and per pt's home health nurse medications have not changed.     Are refills needed on medications prescribed by this physician? YES    Rooming Documentation:  Patient declined to complete questionnaire(s)    Reason for visit: PONCHO ALEXIS

## 2025-04-21 DIAGNOSIS — Z93.4 JEJUNOSTOMY TUBE PRESENT (H): ICD-10-CM

## 2025-04-21 DIAGNOSIS — G71.01 DMD (DUCHENNE MUSCULAR DYSTROPHY) (H): ICD-10-CM

## 2025-04-21 DIAGNOSIS — J95.09 OTHER TRACHEOSTOMY COMPLICATION (H): ICD-10-CM

## 2025-04-21 NOTE — TELEPHONE ENCOUNTER
Received telephone call from patient's mom requesting refill of oxycodone.     Last refill: 4/11/25  Last office visit: 4/15/25  Scheduled for follow up 5/7/25    Will route request to MD/ for review.     [place]ed [place].

## 2025-04-22 DIAGNOSIS — G71.01 DMD (DUCHENNE MUSCULAR DYSTROPHY) (H): ICD-10-CM

## 2025-04-22 DIAGNOSIS — Z93.4 JEJUNOSTOMY TUBE PRESENT (H): ICD-10-CM

## 2025-04-22 DIAGNOSIS — G89.4 CHRONIC PAIN SYNDROME: ICD-10-CM

## 2025-04-22 DIAGNOSIS — J95.09 OTHER TRACHEOSTOMY COMPLICATION (H): ICD-10-CM

## 2025-04-22 RX ORDER — OXYCODONE HCL 5 MG/5 ML
5 SOLUTION, ORAL ORAL EVERY 4 HOURS PRN
Qty: 80 ML | Refills: 0 | Status: SHIPPED | OUTPATIENT
Start: 2025-04-22 | End: 2025-04-22

## 2025-04-22 RX ORDER — OXYCODONE HCL 5 MG/5 ML
5 SOLUTION, ORAL ORAL EVERY 4 HOURS PRN
Qty: 80 ML | Refills: 0 | Status: SHIPPED | OUTPATIENT
Start: 2025-04-22

## 2025-04-22 NOTE — TELEPHONE ENCOUNTER
Resending oxycodone prescription to [facility] per family request.    DWIGHT EsquivelN, RN  Palliative Care Nurse Clinician    [phone] (Direct)  [phone] (Main)  [phone] (Appointment Scheduling)

## 2025-05-06 DIAGNOSIS — G89.4 CHRONIC PAIN SYNDROME: ICD-10-CM

## 2025-05-06 DIAGNOSIS — J95.09 OTHER TRACHEOSTOMY COMPLICATION (H): ICD-10-CM

## 2025-05-06 DIAGNOSIS — Z93.4 JEJUNOSTOMY TUBE PRESENT (H): ICD-10-CM

## 2025-05-06 DIAGNOSIS — G71.01 DMD (DUCHENNE MUSCULAR DYSTROPHY) (H): ICD-10-CM

## 2025-05-06 RX ORDER — OXYCODONE HCL 5 MG/5 ML
5 SOLUTION, ORAL ORAL EVERY 4 HOURS PRN
Qty: 80 ML | Refills: 0 | Status: SHIPPED | OUTPATIENT
Start: 2025-05-06

## 2025-05-06 NOTE — TELEPHONE ENCOUNTER
Received [employer] message from patient requesting refill of  Belbuca and oxycodone .     Last refill of belbuca: 5/1/25 - 7 films  Last refill of oxycodone: 4/22/25  Last office visit: 4/15/25  Scheduled for follow up being rescheduled.     Will route request to MD/ for review.     [place]ed [place].

## 2025-05-07 ENCOUNTER — VIRTUAL VISIT (OUTPATIENT)
Dept: ONCOLOGY | Facility: CLINIC | Age: 35
End: 2025-05-07
Attending: STUDENT IN AN ORGANIZED HEALTH CARE EDUCATION/TRAINING PROGRAM
Payer: MEDICAID

## 2025-05-07 DIAGNOSIS — Z93.0 TRACHEOSTOMY PRESENT (H): ICD-10-CM

## 2025-05-07 DIAGNOSIS — M79.18 MUSCULOSKELETAL PAIN: ICD-10-CM

## 2025-05-07 DIAGNOSIS — M62.838 MUSCLE SPASM: ICD-10-CM

## 2025-05-07 DIAGNOSIS — R07.81 RIB PAIN ON LEFT SIDE: ICD-10-CM

## 2025-05-07 DIAGNOSIS — Z51.5 ENCOUNTER FOR PALLIATIVE CARE: ICD-10-CM

## 2025-05-07 DIAGNOSIS — G71.01 DMD (DUCHENNE MUSCULAR DYSTROPHY) (H): Primary | ICD-10-CM

## 2025-05-07 DIAGNOSIS — M79.2 NEUROPATHIC PAIN: ICD-10-CM

## 2025-05-07 DIAGNOSIS — Z79.891 ENCOUNTER FOR LONG-TERM USE OF OPIATE ANALGESIC: ICD-10-CM

## 2025-05-07 ASSESSMENT — PAIN SCALES - GENERAL: PAINLEVEL_OUTOF10: NO PAIN (0)
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
## 2025-05-07 NOTE — PATIENT INSTRUCTIONS
Recommendations:  -Continue Belbuca 75 mcg film daily as needed for leg and/4 back pain  -Okay to continue oxycodone 5 mg daily as needed for severe from acute pain as well as trach changes.  -Continue ibuprofen 400 mg 1-2x daily as needed.  Pretty consistently taking 1 dose daily almost, but suggested he could try a second dose if it is helpful for this more recent pain.   -Continue gabapentin 200mg in AM and 900 mg in the evening.  -Continue Robaxin 500 to 1000 g up to 4 times daily as needed.    Follow up: 3 months      *Please do not make any changes to medications that we prescribe, including pain medicines, without talking to our team*    Reasons to Call    If you are having worsening/uncontrolled symptoms we want you to call!    You or your other physicians make any changes to medications we have prescribed.  -Please call for refills 4-5 days before you will run out of medication.    Important Phone Numbers, including: Refills, scheduling, and general questions     Palliative Care RN: Shyanne Lamas : [phone]  *For scheduling needs/follow up visits : [phone]  *After hours or on weekends- Will connect you with on call MD : [phone].
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
## 2025-05-07 NOTE — NURSING NOTE
Current patient location: [address] [facility] [address]    Is the patient currently in the state of [place]? YES    Visit mode: VIDEO    If the visit is dropped, the patient can be reconnected by:VIDEO VISIT: Text to cell phone:   Telephone Information:   Mobile [phone]    and VIDEO VISIT: Send to e-mail at: [email or web address]    Will anyone else be joining the visit? Amilcar's Nurse  (If patient encounters technical issues they should call [phone] :[phone])    Are changes needed to the allergy or medication list? No    Are refills needed on medications prescribed by this physician? NO    Rooming Documentation:  Not applicable    Reason for visit: PONCHO ALEXIS
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
## 2025-06-16 DIAGNOSIS — Z93.4 JEJUNOSTOMY TUBE PRESENT (H): ICD-10-CM

## 2025-06-16 DIAGNOSIS — G71.01 DMD (DUCHENNE MUSCULAR DYSTROPHY) (H): ICD-10-CM

## 2025-06-16 DIAGNOSIS — J95.09 OTHER TRACHEOSTOMY COMPLICATION (H): ICD-10-CM

## 2025-06-16 RX ORDER — OXYCODONE HCL 5 MG/5 ML
5 SOLUTION, ORAL ORAL EVERY 4 HOURS PRN
Qty: 80 ML | Refills: 0 | Status: SHIPPED | OUTPATIENT
Start: 2025-06-16

## 2025-06-16 NOTE — TELEPHONE ENCOUNTER
Received telephone call from patient's mother requesting refill of oxycodone.     Last refill: 6/6/25  Last office visit: 5/7/25  Scheduled for follow up 8/8/25     Will route request to MD/ for review.     [place]ed [place].

## 2025-06-25 ENCOUNTER — MYC REFILL (OUTPATIENT)
Dept: PALLIATIVE CARE | Facility: CLINIC | Age: 35
End: 2025-06-25
Payer: MEDICAID

## 2025-06-25 DIAGNOSIS — G71.01 DMD (DUCHENNE MUSCULAR DYSTROPHY) (H): ICD-10-CM

## 2025-06-25 DIAGNOSIS — Z93.4 JEJUNOSTOMY TUBE PRESENT (H): ICD-10-CM

## 2025-06-25 DIAGNOSIS — J95.09 OTHER TRACHEOSTOMY COMPLICATION (H): ICD-10-CM

## 2025-06-25 RX ORDER — OXYCODONE HCL 5 MG/5 ML
5 SOLUTION, ORAL ORAL EVERY 4 HOURS PRN
Qty: 100 ML | Refills: 0 | Status: SHIPPED | OUTPATIENT
Start: 2025-06-25

## 2025-06-25 NOTE — TELEPHONE ENCOUNTER
Received [email or web address] message from patient requesting refill of oxycodone.     Last refill: 6/16/25  Last office visit: 5/7/25  Scheduled for follow up 8/8/25     Will route request to MD/ for review.     [place]ed [place].

## 2025-07-01 ENCOUNTER — MYC REFILL (OUTPATIENT)
Dept: PALLIATIVE CARE | Facility: CLINIC | Age: 35
End: 2025-07-01
Payer: MEDICAID

## 2025-07-01 DIAGNOSIS — J95.09 OTHER TRACHEOSTOMY COMPLICATION (H): ICD-10-CM

## 2025-07-01 DIAGNOSIS — G71.01 DMD (DUCHENNE MUSCULAR DYSTROPHY) (H): ICD-10-CM

## 2025-07-01 DIAGNOSIS — G89.4 CHRONIC PAIN SYNDROME: ICD-10-CM

## 2025-07-01 DIAGNOSIS — M79.18 MUSCULOSKELETAL PAIN: ICD-10-CM

## 2025-07-01 DIAGNOSIS — Z93.4 JEJUNOSTOMY TUBE PRESENT (H): ICD-10-CM

## 2025-07-01 RX ORDER — OXYCODONE HCL 5 MG/5 ML
5 SOLUTION, ORAL ORAL EVERY 4 HOURS PRN
Qty: 100 ML | Refills: 0 | Status: SHIPPED | OUTPATIENT
Start: 2025-07-01

## 2025-07-01 RX ORDER — GABAPENTIN 100 MG/1
200 CAPSULE ORAL 3 TIMES DAILY PRN
Qty: 180 CAPSULE | Refills: 3 | Status: SHIPPED | OUTPATIENT
Start: 2025-07-01

## 2025-07-01 NOTE — TELEPHONE ENCOUNTER
Received [employer] message from patient requesting refill of belbuca, oxycodone and gabapentin.     Last refill of belbuca: 5/7/25  Last refill of oxycodone: 6/25/25  Last refill of gabapentin 100 mg capsules: 5/30/25  Last office visit: 5/7/25  Scheduled for follow up 8/8/25     Will route request to MD/ for review.     Reviewed [place].

## 2025-07-16 ENCOUNTER — MYC REFILL (OUTPATIENT)
Dept: PALLIATIVE CARE | Facility: CLINIC | Age: 35
End: 2025-07-16
Payer: MEDICAID

## 2025-07-16 DIAGNOSIS — Z93.4 JEJUNOSTOMY TUBE PRESENT (H): ICD-10-CM

## 2025-07-16 DIAGNOSIS — J95.09 OTHER TRACHEOSTOMY COMPLICATION (H): ICD-10-CM

## 2025-07-16 DIAGNOSIS — G71.01 DMD (DUCHENNE MUSCULAR DYSTROPHY) (H): ICD-10-CM

## 2025-07-16 RX ORDER — OXYCODONE HCL 5 MG/5 ML
5 SOLUTION, ORAL ORAL EVERY 4 HOURS PRN
Qty: 100 ML | Refills: 0 | Status: SHIPPED | OUTPATIENT
Start: 2025-07-16

## 2025-07-16 NOTE — TELEPHONE ENCOUNTER
Received howsimplet message from patient requesting refill of oxycodone.     Last refill: 7/2/25  Last office visit: 5/7/25  Scheduled for follow up 8/8/25     Will route request to MD/ for review.     [place]ed [place].

## 2025-07-28 ENCOUNTER — MYC REFILL (OUTPATIENT)
Dept: PALLIATIVE CARE | Facility: CLINIC | Age: 35
End: 2025-07-28
Payer: MEDICAID

## 2025-07-28 DIAGNOSIS — J95.09 OTHER TRACHEOSTOMY COMPLICATION (H): ICD-10-CM

## 2025-07-28 DIAGNOSIS — G71.01 DMD (DUCHENNE MUSCULAR DYSTROPHY) (H): ICD-10-CM

## 2025-07-28 DIAGNOSIS — Z93.4 JEJUNOSTOMY TUBE PRESENT (H): ICD-10-CM

## 2025-07-28 RX ORDER — OXYCODONE HCL 5 MG/5 ML
5 SOLUTION, ORAL ORAL EVERY 4 HOURS PRN
Qty: 100 ML | Refills: 0 | Status: SHIPPED | OUTPATIENT
Start: 2025-07-28

## 2025-07-28 NOTE — TELEPHONE ENCOUNTER
Received voicemail from patient requesting refill of oxycodone.     Last refill: 7/16/25  Last office visit: 5/7/25  Scheduled for follow up 8/8/25     Will route request to MD/ for review.     Reviewed [place].

## 2025-08-07 ENCOUNTER — MYC REFILL (OUTPATIENT)
Dept: PALLIATIVE CARE | Facility: CLINIC | Age: 35
End: 2025-08-07
Payer: MEDICAID

## 2025-08-07 DIAGNOSIS — G71.01 DMD (DUCHENNE MUSCULAR DYSTROPHY) (H): ICD-10-CM

## 2025-08-07 DIAGNOSIS — J95.09 OTHER TRACHEOSTOMY COMPLICATION (H): ICD-10-CM

## 2025-08-07 DIAGNOSIS — Z93.4 JEJUNOSTOMY TUBE PRESENT (H): ICD-10-CM

## 2025-08-07 RX ORDER — OXYCODONE HCL 5 MG/5 ML
5 SOLUTION, ORAL ORAL EVERY 4 HOURS PRN
Qty: 100 ML | Refills: 0 | Status: SHIPPED | OUTPATIENT
Start: 2025-08-07

## 2025-08-16 ENCOUNTER — HEALTH MAINTENANCE LETTER (OUTPATIENT)
Age: 35
End: 2025-08-16

## 2025-08-18 ENCOUNTER — MYC REFILL (OUTPATIENT)
Dept: PALLIATIVE CARE | Facility: CLINIC | Age: 35
End: 2025-08-18
Payer: MEDICAID

## 2025-08-18 DIAGNOSIS — G71.01 DMD (DUCHENNE MUSCULAR DYSTROPHY) (H): ICD-10-CM

## 2025-08-18 DIAGNOSIS — Z93.4 JEJUNOSTOMY TUBE PRESENT (H): ICD-10-CM

## 2025-08-18 DIAGNOSIS — J95.09 OTHER TRACHEOSTOMY COMPLICATION (H): ICD-10-CM

## 2025-08-18 RX ORDER — OXYCODONE HCL 5 MG/5 ML
5 SOLUTION, ORAL ORAL EVERY 4 HOURS PRN
Qty: 100 ML | Refills: 0 | Status: SHIPPED | OUTPATIENT
Start: 2025-08-18